# Patient Record
Sex: FEMALE | Race: WHITE
[De-identification: names, ages, dates, MRNs, and addresses within clinical notes are randomized per-mention and may not be internally consistent; named-entity substitution may affect disease eponyms.]

---

## 2019-09-24 ENCOUNTER — HOSPITAL ENCOUNTER (OUTPATIENT)
Dept: HOSPITAL 46 - OPS | Age: 79
Discharge: HOME | End: 2019-09-24
Attending: SURGERY
Payer: MEDICARE

## 2019-09-24 VITALS — WEIGHT: 145 LBS | BODY MASS INDEX: 25.69 KG/M2 | HEIGHT: 63 IN

## 2019-09-24 DIAGNOSIS — Z12.11: Primary | ICD-10-CM

## 2019-09-24 DIAGNOSIS — K21.9: ICD-10-CM

## 2019-09-24 DIAGNOSIS — K57.30: ICD-10-CM

## 2019-09-24 DIAGNOSIS — Z88.5: ICD-10-CM

## 2019-09-24 DIAGNOSIS — K64.8: ICD-10-CM

## 2019-09-24 DIAGNOSIS — Z79.899: ICD-10-CM

## 2019-09-24 DIAGNOSIS — Z88.1: ICD-10-CM

## 2019-09-24 DIAGNOSIS — K63.5: ICD-10-CM

## 2019-09-24 DIAGNOSIS — Z79.82: ICD-10-CM

## 2019-09-24 DIAGNOSIS — Z88.0: ICD-10-CM

## 2019-09-24 DIAGNOSIS — Z86.010: ICD-10-CM

## 2019-09-24 DIAGNOSIS — Z88.2: ICD-10-CM

## 2019-09-24 DIAGNOSIS — Z98.890: ICD-10-CM

## 2019-09-24 DIAGNOSIS — I10: ICD-10-CM

## 2019-09-24 PROCEDURE — 0DBE8ZZ EXCISION OF LARGE INTESTINE, VIA NATURAL OR ARTIFICIAL OPENING ENDOSCOPIC: ICD-10-PCS | Performed by: SURGERY

## 2019-09-24 PROCEDURE — G0500 MOD SEDAT ENDO SERVICE >5YRS: HCPCS

## 2019-09-24 NOTE — NUR
09/24/19 1056 [address] Rangel 1036 PT ARRIVED IN PACU SLEEPY. ABD SOFT. 1045 PT AWAKENS. SITTING
UP IN BED. GLASSES RETURNED.

## 2019-09-24 NOTE — OR
[facility]
                                    [address], [place]  [address]
_________________________________________________________________________________________
                                                                 Signed   
 
 
DATE OF OPERATION:
09/24/2019
 
SURGEON:
Issa Simons MD
 
PREOPERATIVE DIAGNOSES:
1. Personal history of hyperplastic rectal polyps in 2008.
2. Resolved chronic diarrhea.
 
POSTOPERATIVE DIAGNOSES:
1. Minimal sigmoid diverticulosis.
2. 4 mm polyp at 55 cm.
3. Minimal internal hemorrhoids.
 
PROCEDURE PERFORMED:
Colonoscopy with hot biopsy.
 
ESTIMATED BLOOD LOSS:
None.
 
INDICATIONS:
Virginia is a 79-year-old female, who still has good functional status.  She has used a
cane for many years because of poor balance.  Otherwise, she does great.  We did a
colonoscopy in 2008.  She had just a few tiny hyperplastic polyps in the rectum and in
the distal sigmoid colon.  She has no family history of colon cancer or polyps.  She has
no lower GI complaints currently.  She told me the diarrhea has resolved.  She went
through a rather large kidney stone extraction back in 2016.  This required a
nephrostomy tube and so forth.  She also had cardiac clearance for that surgery.  She
said she went through it quite nicely.  In the office, I gave Virginia a pamphlet on
colonoscopy.  We did review the nature of the test along with its risks including, but
not limited to gas, bloating, crampy abdominal pain, bleeding, perforation requiring
surgery, and missed diagnosis.  We also discussed the need for IV conscious sedation.
She has done well with Versed and fentanyl in the past.  She had expressed understanding
and wished to proceed. 
 
PROCEDURE NOTE:
Virginia was taken into our endoscopy suite and placed in the left lateral decubitus
position.  She was given a total of 5 mg of Versed, 125 mcg of fentanyl to cover the
case.  A digital rectal exam was performed and this was unremarkable.  The adult
colonoscope was then introduced and advanced under direct visualization of the camera.
She required some extra sedation as we came through her sigmoid colon.  She does have
 
    Electronically Signed By: ISSA SIMONS MD  09/24/19 1656
_________________________________________________________________________________________
PATIENT NAME:     ONEIL LOPEZ               
MEDICAL RECORD #: [id number]            OPERATIVE REPORT              
          ACCT #: [id number]  
DATE OF BIRTH:   [birth year]            REPORT #: [id number]      
PHYSICIAN:        ISSA SIMONS MD             
PCP:              CAPO SCOTT MD           
REPORT IS CONFIDENTIAL AND NOT TO BE RELEASED WITHOUT AUTHORIZATION
 
 
                                  [facility]
                                    [address], [place]  [address]
_________________________________________________________________________________________
                                                                 Signed   
 
 
minimal sigmoid diverticulosis, but the colon is a little bit narrow and it just took a
little extra sedation.  Once we reached the cecum, we could easily see the appendiceal
orifice and the ileocecal valve.  Her right colon was actually fairly short.  Her prep
was quite good.  We slowly withdrew the scope.  Pictures were taken throughout for
photodocumentation.  Back at 55-60 cm, she had a small polypoid lesion, which we removed
with the help of hot biopsy forceps.  The scope was then withdrawn further through the
sigmoid colon again back into the rectum.  The rectum was unremarkable.  We had just
enough room to retroflex the scope and she has some very minimal internal hemorrhoid
tissue.  After this, the gas was suctioned out and colonoscope removed.  Virginia
tolerated the procedure quite well. 
 
RECOMMENDATIONS:
I will see Virginia back in my office in 7 to 14 days to review her results.  She will
resume aspirin in 1 week. 
 
 
 
            ________________________________________
            Issa Simons MD 
 
 
ALB/MODL
Job #:  [id number]
DD:  09/24/2019 10:42:06
DT:  09/24/2019 14:50:44
 
cc:            MD Capo Ramirez MD
 
 
Copies:  ISSA SIMONS MD, JONATHAN MD
~
 
 
 
 
 
 
 
 
 
    Electronically Signed By: ISSA SIMONS MD  09/24/19 1656
_________________________________________________________________________________________
PATIENT NAME:     ONEIL LOPEZ               
MEDICAL RECORD #: [id number]            OPERATIVE REPORT              
          ACCT #: [id number]  
DATE OF BIRTH:   [birth year]            REPORT #: [id number]      
PHYSICIAN:        ISSA SIMONS MD             
PCP:              CAPO SCOTT MD           
REPORT IS CONFIDENTIAL AND NOT TO BE RELEASED WITHOUT AUTHORIZATION

## 2019-09-25 NOTE — PATH
[facility]
                                    [address], [place]  [address]
_________________________________________________________________________________________
                                                                 Signed   
 
 
 
SPECIMEN(S): A COLON POLYP AT 55 CM
 
SPECIMEN SOURCE:
A. COLON POLYP AT 55 CM
 
CLINICAL HISTORY:
Hx: Polyps. Postop: Diverticulosis, polyp.
MICROSCOPIC DESCRIPTION:
Histologic sections of all submitted blocks are examined by light microscopy. 
These findings, together with the gross examination, support the pathologic 
diagnosis. 
 
FINAL PATHOLOGIC DIAGNOSIS:
Mucosa, colon at 55 cm, biopsy:
-  Surface features suggestive but not entirely diagnostic of hyperplastic 
polyp. 
 
COMMENT:
Multiple levels over three slides are examined.  No adenomatous change or full 
thickness hyperplastic change is seen. 
LJA:cml:C2NR
 
GROSS DESCRIPTION:
The specimen, labeled "VR, colon polyp at 55 cm," is received in formalin and 
consists of two tan-white soft tissue fragments each measuring 0.3 cm in 
greatest dimension.  The specimen is entirely 
submitted in cassette (A1).
AR (under the direct supervision of a pathologist)
The Gross Description was prepared using a voice recognition system.  The 
report was reviewed for accuracy; however, sound-alike word errors, addition 
and/or deletions may occur.  If there is any 
question about this report, please contact Client Services.
 
PERFORMING LABORATORY:
The technical component was performed by [facility], [address] [place] [id number] (Medical Director: Roma Owens MD; CLIA# [id number]). 
Professional interpretation was performed by 
[facility], [address] [address] (Medical Director:  Lee Mccord MD; CLIA# 
[id number]). 
 
 
                                                                                    
_________________________________________________________________________________________
PATIENT NAME:     ONEIL LOPEZ               
MEDICAL RECORD #: [id number]            PATHOLOGY                     
          ACCT #: [id number]       ACCESSION #: [id number]     
DATE OF BIRTH:   [birth year]            REPORT #: [id number]       
PHYSICIAN:        [facility]              
PCP:              CAPO SCOTT MD           
REPORT IS CONFIDENTIAL AND NOT TO BE RELEASED WITHOUT AUTHORIZATION
 
 
                                  [address] [place], [place]  [address]
_________________________________________________________________________________________
                                                                 Signed   
 
 
Diagnostician:  Lee Mccord MD
Pathologist
Electronically Signed 09/25/2019
 
 
Copies:                                
~
 
 
 
 
 
 
 
 
 
 
 
 
 
 
 
 
 
 
 
 
 
 
 
 
 
 
 
 
 
 
 
 
 
 
 
 
                                                                                    
_________________________________________________________________________________________
PATIENT NAME:     ONEIL LOPEZ               
MEDICAL RECORD #: [id number]            PATHOLOGY                     
          ACCT #: [id number]       ACCESSION #: [id number]     
DATE OF BIRTH:   [birth year]            REPORT #: [id number]       
PHYSICIAN:        [facility]              
PCP:              CAPO SCOTT MD           
REPORT IS CONFIDENTIAL AND NOT TO BE RELEASED WITHOUT AUTHORIZATION

## 2020-08-02 ENCOUNTER — HOSPITAL ENCOUNTER (INPATIENT)
Dept: HOSPITAL 46 - ED | Age: 80
LOS: 8 days | Discharge: NURSING, REHAB OR LONG-TERM CARE | DRG: 482 | End: 2020-08-10
Attending: SPECIALIST | Admitting: SPECIALIST
Payer: MEDICARE

## 2020-08-02 VITALS — WEIGHT: 149.01 LBS | BODY MASS INDEX: 26.4 KG/M2 | HEIGHT: 63 IN

## 2020-08-02 DIAGNOSIS — R82.71: ICD-10-CM

## 2020-08-02 DIAGNOSIS — K59.00: ICD-10-CM

## 2020-08-02 DIAGNOSIS — Z20.828: ICD-10-CM

## 2020-08-02 DIAGNOSIS — S72.041A: Primary | ICD-10-CM

## 2020-08-02 DIAGNOSIS — R09.02: ICD-10-CM

## 2020-08-02 DIAGNOSIS — K21.9: ICD-10-CM
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
## 2020-08-02 PROCEDURE — P9016 RBC LEUKOCYTES REDUCED: HCPCS

## 2020-08-02 PROCEDURE — C1713 ANCHOR/SCREW BN/BN,TIS/BN: HCPCS

## 2020-08-02 PROCEDURE — C9803 HOPD COVID-19 SPEC COLLECT: HCPCS

## 2020-08-02 PROCEDURE — A9270 NON-COVERED ITEM OR SERVICE: HCPCS

## 2020-08-02 NOTE — XMS
Encounter Summary
  Created on: 2020
 
 John Virginia Su
 External Reference #: [id number]
 : 42
 Sex: Female
 
 Demographics
 
 
+-----------------------+----------------------+
| Address               | [address]    |
|                       | [place]  [address] |
+-----------------------+----------------------+
| Home Phone            | [phone]      |
+-----------------------+----------------------+
| Preferred Language    | Unknown              |
+-----------------------+----------------------+
| Marital Status        |              |
+-----------------------+----------------------+
| Alevism Affiliation | Unknown              |
+-----------------------+----------------------+
| Race                  | Unknown              |
+-----------------------+----------------------+
| Ethnic Group          | Unknown              |
+-----------------------+----------------------+
 
 
 Author
 
 
+--------------+--------------------------------------------+
| Author       | [facility]  |
|              | and [facility]                                |
+--------------+--------------------------------------------+
| Organization | [facility]  |
|              | and [facility]                                |
+--------------+--------------------------------------------+
| Address      | Unknown                                    |
+--------------+--------------------------------------------+
| Phone        | Unavailable                                |
+--------------+--------------------------------------------+
 
 
 
 Support
 
 
+---------------+--------------+-------------------+-----------------+
| Name          | Relationship | Address           | Phone           |
+---------------+--------------+-------------------+-----------------+
| Nawaf Carrera | ECON         | [address]  | [phone] |
|               |              | [place], [place]   |                 |
|               |              | [address]             |                 |
+---------------+--------------+-------------------+-----------------+
 
 
 
 
 Care Team Providers
 
 
+----------------------------+------+-----------------+
| Care Team Member Name      | Role | Phone           |
+----------------------------+------+-----------------+
| Carmine Gomez  | PCP  | [phone] |
| MD                         |      |                 |
+----------------------------+------+-----------------+
 
 
 
 Reason for Referral
 Diagnostic/Screening (Routine)
 
+--------+--------+-----------+--------------+--------------+---------------+
| Status | Reason | Specialty | Diagnoses /  | Referred By  | Referred To   |
|        |        |           | Procedures   | Contact      | Contact       |
+--------+--------+-----------+--------------+--------------+---------------+
| Closed |        | Radiology |   Diagnoses  |   [place], |   [facility] |
|        |        |           |  Abnormal    |  MD Cynthia  |  Medicine     |
|        |        |           | electrocardi |  [address]    | [address]  |
|        |        |           | ogram (ECG)  | [address]   |  [place], |
|        |        |           | (EKG)        | [place], |  [place]           |
|        |        |           | Encounter    |  [place] [address]    | [address]    |
|        |        |           | for other    | Phone:       | Phone:        |
|        |        |           | preprocedura | [phone] | [phone]  |
|        |        |           | l            |   Fax:       |  Fax:         |
|        |        |           | examination  | [phone] | [phone]  |
|        |        |           |  Procedures  |              |               |
|        |        |           |  NM Nuclear  |              |               |
|        |        |           | Stress Test  |              |               |
|        |        |           | (Vasodilator |              |               |
|        |        |           | )  CHG       |              |               |
|        |        |           | MYOCARDIAL   |              |               |
|        |        |           | SPECT        |              |               |
|        |        |           | MULTIPLE     |              |               |
|        |        |           | STUDIES  ND  |              |               |
|        |        |           | CV STRS TST  |              |               |
|        |        |           | XERS&/OR RX  |              |               |
|        |        |           | CONT ECG W/O |              |               |
|        |        |           |  I&R  ND     |              |               |
|        |        |           | CARDIAC      |              |               |
|        |        |           | STRESS       |              |               |
|        |        |           | TST,INTERP/R |              |               |
|        |        |           | EPT ONLY     |              |               |
+--------+--------+-----------+--------------+--------------+---------------+
 
 
 Diagnostic/Screening (Routine)
 
+--------+--------+-----------+--------------+--------------+---------------+
| Status | Reason | Specialty | Diagnoses /  | Referred By  | Referred To   |
|        |        |           | Procedures   | Contact      | Contact       |
+--------+--------+-----------+--------------+--------------+---------------+
| Closed |        | Radiology |   Diagnoses  |   [place], |   [facility]    |
|        |        |           |  Pre-op exam |  MD Cynthia  | [address]  |
|        |        |           |   Abnormal   |  [address]    |  [place], |
|        |        |           | EKG          | [address]   |  [place]           |
 
|        |        |           | Procedures   | [place], | [address]    |
|        |        |           | ECHO         |  [place] [address]    | Phone:        |
|        |        |           | Complete  ND | Phone:       | [phone]  |
|        |        |           |  ECHO HEART  | [phone] |  Fax:         |
|        |        |           | XTHORACIC,CO |   Fax:       | [phone]  |
|        |        |           | MPLETE W     | [phone] |               |
|        |        |           | DOPPLER  ND  |              |               |
|        |        |           | ECHO HEART   |              |               |
|        |        |           | XTHORACIC,CO |              |               |
|        |        |           | MPLETE, W/O  |              |               |
|        |        |           | DOPPLER      |              |               |
+--------+--------+-----------+--------------+--------------+---------------+
 
 
 
 
 Reason for Visit
 
 
+----------------+-------------------------------------+
| Reason         | Comments                            |
+----------------+-------------------------------------+
| New Patient    | RBBB                                |
+----------------+-------------------------------------+
| Pre-op Exam    | Kidney stone and Bladder Suspension |
+----------------+-------------------------------------+
| Abnormal Tests | EKG                                 |
+----------------+-------------------------------------+
 Evaluate & Treat (Routine)
 
+--------+--------+------------+--------------+--------------+---------------+
| Status | Reason | Specialty  | Diagnoses /  | Referred By  | Referred To   |
|        |        |            | Procedures   | Contact      | Contact       |
+--------+--------+------------+--------------+--------------+---------------+
| Closed |        | Cardiology |   Diagnoses  |   [place],   |   [place],  |
|        |        |            |  Abnormal    | Carmine     | MD Cynthia    |
|        |        |            | EKG  RBBB    | MD Delio  | [address]      |
|        |        |            | Preop        |  [address]     | [address]    |
|        |        |            | cardiovascul | Kaylyn [facility]  | [place],  |
|        |        |            | ar exam      |  [place]  | [place] [address]      |
|        |        |            | Procedures   | OR           | Phone:        |
|        |        |            | NP           | [address]   | [phone]  |
|        |        |            |              | Phone:       |  Fax:         |
|        |        |            |              | [phone] | [phone]  |
|        |        |            |              |   Fax:       |               |
|        |        |            |              | [phone] |               |
+--------+--------+------------+--------------+--------------+---------------+
 
 
 
 
 Encounter Details
 
 
+--------+---------+----------------------+----------------------+---------------------+
| Date   | Type    | Department           | Care Team            | Description         |
+--------+---------+----------------------+----------------------+---------------------+
| / | Office  |   [facility]          |   Cynthia Thomson, | RBBB (Primary Dx);  |
|    | Visit   | CARDIOLOGY  [address]    |  MD  [address] | Pre-op exam;        |
|        |         | [address]  [place], |  St.  [place],   | Abnormal EKG        |
 
|        |         |  [place] [address]       | [place] [address]             |                     |
|        |         | [phone]         | [phone]         |                     |
|        |         |                      | [phone] (Fax)   |                     |
+--------+---------+----------------------+----------------------+---------------------+
 
 
 
 Social History
 
 
+---------------+-------+-----------+--------+------+
| Tobacco Use   | Types | Packs/Day | Years  | Date |
|               |       |           | Used   |      |
+---------------+-------+-----------+--------+------+
| Former Smoker |       |           |        |      |
+---------------+-------+-----------+--------+------+
 
 
 
+---------------------+---+---+---+
| Smokeless Tobacco:  |   |   |   |
| Never Used          |   |   |   |
+---------------------+---+---+---+
 
 
 
+------------------------+
| Comments: quit in  |
+------------------------+
 
 
 
+-------------+----------------------+---------+----------+
| Alcohol Use | Drinks/Week          | oz/Week | Comments |
+-------------+----------------------+---------+----------+
| No          |   0 Standard drinks  | 0.0     |          |
|             | or equivalent        |         |          |
+-------------+----------------------+---------+----------+
 
 
 
+------------------+---------------+
| Sex Assigned at  | Date Recorded |
| Birth            |               |
+------------------+---------------+
| Not on file      |               |
+------------------+---------------+
 documented as of this encounter
 
 Last Filed Vital Signs
 
 
+-------------------+----------------------+----------------------+----------+
| Vital Sign        | Reading              | Time Taken           | Comments |
+-------------------+----------------------+----------------------+----------+
| Blood Pressure    | 124/82               | 2016  2:06 PM  |          |
|                   |                      | PDT                  |          |
+-------------------+----------------------+----------------------+----------+
| Pulse             | 84                   | 2016  1:57 PM  | Regular  |
|                   |                      | PDT                  |          |
 
+-------------------+----------------------+----------------------+----------+
| Temperature       | -                    | -                    |          |
+-------------------+----------------------+----------------------+----------+
| Respiratory Rate  | 16                   | 2016  1:57 PM  |          |
|                   |                      | PDT                  |          |
+-------------------+----------------------+----------------------+----------+
| Oxygen Saturation | -                    | -                    |          |
+-------------------+----------------------+----------------------+----------+
| Inhaled Oxygen    | -                    | -                    |          |
| Concentration     |                      |                      |          |
+-------------------+----------------------+----------------------+----------+
| Weight            | 74.1 kg (163 lb 4.8  | 2016  1:57 PM  |          |
|                   | oz)                  | PDT                  |          |
+-------------------+----------------------+----------------------+----------+
| Height            | 160 cm (5' 3")       | 2016  1:57 PM  |          |
|                   |                      | PDT                  |          |
+-------------------+----------------------+----------------------+----------+
| Body Mass Index   | 28.93                | 2016  1:57 PM  |          |
|                   |                      | PDT                  |          |
+-------------------+----------------------+----------------------+----------+
 documented in this encounter
 
 Patient Instructions
 Patient Instructions Sharda Boucher RN - 2016  3:13 PM PDTEcho:  
  Date:____________________________________
 
  Check-In Time:____________________________
 
  Where to Check In:_________________________
 
 Persantine/Lexiscan Myoview
 
 Date: _______________________________________________ 
 
 Check-in Time: _______________________________________
 
 Where to Check In: _______________________________________
 
 Instructions
 
 1. Nothing to eat or drink anything 6 hours prior to Persantine/Lexiscan
 2.  DO NOT drink caffeine 12 hours prior to the test.
 3.  You can take all other medications the morning of the test with a small sip of water.
 4.  Please bring a list of your current medications with you.
 
 Resting Portion of test:
 
 Date: _________________________________________________
 
 Check-in Time: _________________________________________
 
 Where to Check In: _________________________________________
 
 Follow up appointment: 2-4 weeks
 
  Provider: Cynthia Thomson MD
 
  Date:___________________________________________________
 
  Check-In Time:___________________________________________
 
 Electronically signed by Sharda Boucher RN at 2016  3:14 PM PDT
 documented in this encounter
 
 Progress Notes
 Cynthia Thomson MD - 2016  2:05 PM PDTFormatting of this note might be different f
rom the original.
   
 
 PATIENT NAME:  Laura Castelan  
 : 1940:         AGE: 76 y.o.
 REFERRED BY: Carmine Gomez PRIMARY CARE:
 Carmine Gomez MD
  
 
 NEW PATIENT OFFICE VISIT
 
 Date of Service: 16
 
 HISTORY OF PRESENT ILLNESS:
 Laura Castelan is a 76 y.o. female with a history of osteoporosis, heart murmur, kidney
 stone, tipped bladder and abnormal EKG.  She is being seen today for preop clearance prior 
to bladder surgery.
 
 Patient was recently seen by Dr. Cruz for a kidney stone who planned to perform operation
.  EKG was performed and revealed a right bundle branch block.
 
 Today, patient complains of bilateral ankle swelling.  However, her physical activity is ve
ry limited.  She cannot walk far due to the imbalance.  She's been most of the time sitting,
 watching TV and reading books.  She was told that she had a heart murmur in the past.  Ther
e is no chest pain or chest discomfort both at rest and on exertion.  Patient denies breathl
essness.  There is no palpitation dizziness or lightheadedness.  Patient can sleep on one pi
llow at night without difficulty breathing.  
 
 CURRENT PROBLEMS
 Patient Active Problem List 
 Diagnosis 
   Bundle branch block, right 
   Cystitis, subacute 
   Essential hypertension, benign 
   GERD (gastroesophageal reflux disease) 
   Right nephrolithiasis 
   Osteoporosis, postmenopausal 
   Vitamin D deficiency 
 
 MEDICAL, SURGICAL, AND PERSONAL HISTORY
 Past Surgical History 
 Procedure Laterality Date 
   Colonoscopy  2008 
   hyperplastic polyps 
   Appendectomy   
   Hysterectomy, total abdominal   
   removal of both ovaries 
  
 
 Family History 
 Problem Relation Age of Onset 
   Cirrhosis Father  
   Diabetes Mother  
   Heart disease Mother  
   arteriosclerotic 
 
 
 Family Status 
 Relation Status Death Age 
   Father  58 
   Mother  62 
   Sister Alive  
   Brother Alive  
   Brother Alive  
   Sister   
   Brother   
   Brother   
 
 History 
 
 Social History 
   Marital Status:  
   Spouse Name: N/A 
   Number of Children: 3 
   Years of Education: N/A 
 
 Social History Main Topics 
   Smoking status: Former Smoker 
   Smokeless tobacco: Never Used 
    Comment: quit in  
   Alcohol Use: No 
   Drug Use: No 
   Sexual Activity: Not on file 
 
 Other Topics Concern 
   None 
 
 Social History Narrative 
  Exercise:Stationary bike 
  Caffeine: 1 cup to 1 pot of coffee daily 
  Living situation:alone 
 
 CURRENT MEDICATIONS
 Current Outpatient Prescriptions 
 Medication Sig Dispense Refill 
   alendronate (FOSAMAX) 70 mg tablet Take 70 mg by mouth Once a week.   
   aspirin 81 MG tablet Take 81 mg by mouth Daily.   
   Cholecalciferol (VITAMIN D-3) 2000 units CAPS Take 4,000 Units by mouth Daily.   
   cyanocobalamin (VITAMIN B-12) 500 mcg tablet Take 500 mcg by mouth Daily.   
   ranitidine (ZANTAC) 300 MG capsule Take 300 mg by mouth nightly.   
 
 No current facility-administered medications for this visit. 
  
 
 ALLERGIES
 Allergies 
 Allergen Reactions 
   Codeine Other (See Comments) 
   hallucinations 
   Doxycycline Diarrhea and Nausea And Vomiting 
 
   Penicillins Hives and Rash 
   Sulfa Antibiotics Hives and Rash 
 
 ROS
 Review of Systems 
 Constitutional: Positive for malaise/fatigue. Negative for fever, chills, weight loss and d
iaphoresis. 
 HENT: Negative for congestion, hearing loss, nosebleeds, sore throat and tinnitus.  
 Eyes: Negative for blurred vision and double vision. 
 Respiratory: Positive for shortness of breath. Negative for cough and wheezing.  
 Cardiovascular: Positive for leg swelling. Negative for chest pain, palpitations, orthopnea
, claudication and PND. 
 Gastrointestinal: Positive for heartburn and abdominal pain. Negative for nausea, vomiting,
 diarrhea, constipation, blood in stool and melena. 
 Genitourinary: Positive for dysuria and flank pain. Negative for urgency, frequency and hem
aturia. 
 Musculoskeletal: Negative for myalgias, back pain, joint pain, falls and neck pain. 
 Skin: Negative for itching and rash. 
 Neurological: Positive for tremors. Negative for dizziness, tingling, seizures, loss of con
sciousness, weakness and headaches. 
 Endo/Heme/Allergies: Negative for environmental allergies and polydipsia. Bruises/bleeds ea
sily. 
 Psychiatric/Behavioral: Negative for memory loss. The patient is not nervous/anxious.  
 
 OBJECTIVE:  
 
 PHYSICAL EXAM
 /82 mmHg | Pulse 84 | Resp 16 | Ht 1.6 m (5' 3") | Wt 74.072 kg (163 lb 4.8 oz) | BMI
 28.93 kg/m2
 Physical Exam 
 Constitutional: She appears well-developed and well-nourished. No distress. 
 Female individual without acute distress, arrived with a cane, accompanied by her son. 
 Neck: Normal carotid pulses, no hepatojugular reflux and no JVD present. Carotid bruit is n
ot present. 
 Cardiovascular: Normal rate, regular rhythm, S1 normal, S2 normal, intact distal pulses and
 normal pulses.  PMI is not displaced.  Exam reveals no gallop, no S3, no S4 and no friction
 rub.  
 Murmur (rade 1/6 holosystolic murmur along) heard.
 Pulses:
      Carotid pulses are 2+ on the right side, and 2+ on the left side.
      Dorsalis pedis pulses are 2+ on the right side, and 2+ on the left side. 
 Pulmonary/Chest: Effort normal and breath sounds normal. No accessory muscle usage. No resp
iratory distress. She has no wheezes. She has no rhonchi. She has no rales. 
 Abdominal: Normal appearance, normal aorta and bowel sounds are normal. She exhibits no abd
ominal bruit. There is no hepatosplenomegaly. There is no tenderness. 
 Musculoskeletal: She exhibits edema (bilateral 1+ ankle pitting edema.). 
 Neurological: She is alert. Gait normal. 
 Skin: Skin is warm and dry. 
 Psychiatric: She has a normal mood and affect. Her mood appears not anxious. She does not e
xhibit a depressed mood. 
 
 ECG:  Normal sinus rhythm, complete right bundle branch block, nonspecific ST-T abnormaliti
es.
 
 LAB RESULTS: 
 LIPID
 No results found for: CHOL, TRIG, HDL, LDL, CHOLHDL, LDLEX, HDLEX, TRIGEX, CHOLEX
 CHEMISTRY
 Lab Results 
 Component Value Date 
 
  GLUEX 111* 2015 
  NAEX 138 2015 
  KEX 3.8 2015 
  CLEX 104 2015 
  CO2EX 25 2015 
  ASTEX 16 2015 
  ALTEX 9 2015 
  EGFREX 76 2015 
  CREEX 0.75 2015 
 
 HEMATOLOGY
 Lab Results 
 Component Value Date 
  WBCEX 13.6* 2015 
  HGBEX 15.3 2015 
  HCTEX 45.6* 2015 
  PLTEX 223 2015 
 
 I reviewed records from Carmine Gomez M.D. for office visit on 16.  Referr
al to cardiologist for preop clearance
 
 ASSESSMENT:  
 
 1.  Right bundle-branch block and ankle swelling, preop clearance prior to bladder surgery
  A. Patient was found to have a complete right bundle branch block on the preop EKG.
  B. Today, patient complains of bilateral ankle swelling.  However, her physical activity i
s very limited.  She cannot walk far due to the imbalance.  She's been most of the time sitt
ing, watching TV and reading books.  She was told that she had a heart murmur in the past.  
There is no chest pain or chest discomfort both at rest and on exertion.  Patient denies brett
athlessness. 
   She is in a class II of New York Heart Association functional class.  There is 1+ bilater
al ankle pitting edema on physical examination.
  She is undergoing an elective, non-emergent surgery and would benefit from a cardiac risk 
assessment.  Her Revised Cardiac Risk Index (RCRI) is calculated showing his risks include  
high risk surgery, coronary artery disease, congestive heart failure, cerebrovascular diseas
e, diabetes on insulin and serum creatinine >2mg/dL, which is 1 risk equating to Class II, e
stimated 0.9% risk of MACE.  This is considered an elevated risk.  Her Duke Activity Status 
Index (DASI) score is calculated and shows she has a poor functional capacity, so we are emily
ble to evaluate METS, and would benefit from further testing and risk stratification.  She i
s not on a beta blocker, and is not on HMG  CoA reductase inhibitor (statin).  She [employer]
logic stress test..  Prior to proceeding with surgery, the risk and benefit of the procedure
 need to be discussed between patient and surgeon.
 2.  Heart murmur
 3. Kidney stone, tipped bladder  
  A. Patient was recently seen by Dr. Cruz for a kidney stone who planned to perform opera
tion.  EKG was performed and revealed a right bundle branch block.
 
 PLAN:  
 
 1.  Echocardiogram is warranted to assess for potential heart failure.
 2.  Persantine SPECT MPI is indicated to assess cardiac risk prior to noncardiac surgery.
 3.  I recommend a therapeutic lifestyle change including walking 30 minutes a day, choosing
 healthy choices of diet , including DASH diet and weight reduction. 
 4.  Follow-up in 2-4 weeks.
 
 Electronically signed by: Cynthia Thomson MD [facility] 2016 
 
 Portions of this chart may have been created with Dragon voice recognition software. Occasi
onal wrong-word or   sound-alike  substitutions may have occurred due to the inherent saucedo
itations of voice recognition software. Please read the chart carefully and recognize, using
 
 context, where these substitutions have occurred.
 Electronically signed by Cynthia Thomson MD at 2016  4:28 PM PDTdocumented in this 
encounter
 
 Plan of Treatment
 Not on filedocumented as of this encounter
 
 Procedures
 
 
+----------------+--------+-------------+----------------------+----------------------+
| Procedure Name | Priori | Date/Time   | Associated Diagnosis | Comments             |
|                | ty     |             |                      |                      |
+----------------+--------+-------------+----------------------+----------------------+
| ECG 12 LEAD    | Routin | 2016  |   RBBB  Pre-op exam  |   Results for this   |
|                | e      |  1:55 PM    |                      | procedure are in the |
|                |        | PDT         |                      |  results section.    |
+----------------+--------+-------------+----------------------+----------------------+
 documented in this encounter
 
 Results
 NM Nuclear Stress Test (Vasodilator) (2016  1:27 PM PDT)
 
+----------+
| Specimen |
+----------+
|          |
+----------+
 
 
 
+------------------------------------------------------------------------+-----------------+
| Impressions                                                            | Performed At    |
+------------------------------------------------------------------------+-----------------+
|      1.    Persantine EKG is negative.  2.   Normal   Persantine       |   [facility]    |
| Sestamibi myocardial perfusion study with a normal   left ventricular  | [facility]        |
| size and wall thickness.   Preserved left ventricular   systolic       | [facility]  |
| function.   LVEF by gated SPECT 80 %.              Signed by: Cynthia   | - IMAGING       |
| MD Al[facility]     2016, 13:27                                |                 |
+------------------------------------------------------------------------+-----------------+
 
 
 
+------------------------------------------------------------------------+-----------------+
| Narrative                                                              | Performed At    |
+------------------------------------------------------------------------+-----------------+
|                     NUCLEAR MEDICINE STRESS TEST REPORT                |   [facility]    |
| Patient Name: Laura Castelan   Study Date:   2016   Primary   | [facility]        |
| Care Provider: Carmine Gomez MD   MRN:   [id number]   :      | [facility]  |
|   1940 Age:   76 y.o. Gender: female      CLINICAL                 | - IMAGING       |
| HISTORY/DIAGNOSIS:   Chest pain        PERSANTINE SESTAMIBI STRESS     |                 |
| TEST  Indication:   chest pain      Procedure:   In the supine         |                 |
| position, 42.1 mg of   Persantine was infused intravenously   over 4   |                 |
| minutes.   Blood pressure and EKG were monitored every 1 minute.   5   |                 |
|  mL of normal saline was utilized to flush the IV line.   2.5 minutes  |                 |
| later,   10.4 mCi sestamibi intravenous injection.   SPECT myocardial  |                 |
| perfusion   imaging was acquired with wall motion analysis.   Rest     |                 |
| imaging was   performed using 34.1 mCi Sestamibi intravenous           |                 |
| injection.   Repeated SPECT   myocardial perfusion imaging was         |                 |
| acquired with wall motion analysis.   At   the end of the procedure,   |                 |
 
| 75 mg of aminophylline was infused   intravenously.     Hemodynamics:  |                 |
|     Heart rate baseline 80 beats per minute, peak 94 beats per minute. |                 |
|    Blood   pressure baseline 143/72 mmHg, peak 119/65 mmHg.   EKG      |                 |
| baseline underlying   sinus rhythm, complete right bundle branch       |                 |
| block.       Peak unchanged.     Side Effects:   None.     Arrhythmia: |                 |
|    None.     Persantine Sestamibi Myocardial Perfusion Imaging Result: |                 |
|           The Persantine Sestamibi tomographic images, reviewed        |                 |
| without the   attenuation compensation resolution, revealed a normal   |                 |
| myocardial   perfusion pattern as seen in short axis, vertical long    |                 |
| axis, and   horizontal long axis projections. The left ventricular     |                 |
| cavity is normal.   The rest imaging is also normal.                   |                 |
| Gated SPECT reveals a normal left ventricular wall thickness and       |                 |
| motion.     Preserved left ventricular systolic function. LVEF by      |                 |
| gated SPECT is 80 %.                                                   |                 |
+------------------------------------------------------------------------+-----------------+
 
 
 
+----------------------+---------------------+--------------------+----------------+
| Performing           | Address             | City/State/Zipcode | Phone Number   |
| Organization         |                     |                    |                |
+----------------------+---------------------+--------------------+----------------+
|   [facility]     |   [address] |   [place]  |   [phone] |
| [facility]  |                     | [phone]              |                |
| - IMAGING            |                     |                    |                |
+----------------------+---------------------+--------------------+----------------+
 ECHO Complete (2016  8:50 AM PDT)
 
+----------+
| Specimen |
+----------+
|          |
+----------+
 
 
 
+-----------------------------------------------------------------------------+-------------
----+
| Narrative                                                                   | Performed At
    |
+-----------------------------------------------------------------------------+-------------
----+
|   This result has an attachment that is not available.  Transthoracic       |   [facility]
    |
| Echocardiography Report (TTE)  Demographics  Patient Name    JOHN PORTILLO    
    |
| [place] Room Number                        A  Patient Number               | [facility]
ER  |
| [id number]         Date of Study             2016  Visit Number      | - IMAGING   
    |
|     [id number]  Accession         [id number]          Referring            |             
    |
| Physician    ALIX MARIE Number  Date of Birth   1940            |             
    |
|     Sonographer                SENA PATEL                          |             
    |
|                                                                [place]       |             
    |
| [place],                                                                 |             
    |
 
|                         [place]  Age                  76 year(s)                 |             
    |
| Interpreting               ALIX MARIE                                 |             
    |
|                       Cardiologist               CYNTHIA THOMSON MD       |             
    |
|  Gender             Female                Nurse Procedure Type of           |             
    |
| Study  TTE procedure: ECHO Complete. Procedure dateDate:                    |             
    |
| 2016Start: 08:09 AM Study Location: Echo LabIndications:              |             
    |
| Abnormal .31/R94.31 and preop Exam [id number].Patient            |             
    |
| Status: RoutineHeight: 63 inchesWeight: 163 poundsBSA: 1.77 m^2BMI:         |             
    |
| 28.87 kg/m^2Rhythm: Normal Sinus RhythmHR: 78 bpm ConclusionsSummary1.      |             
    |
|  Normal left ventricular size, wall thickness and motion. Preserved         |             
    |
| leftventricular systolic function. LVEF is 65%.2. Grade 1 left              |             
    |
| ventricular diastolic dysfunction.3. Normal valvular structure.4.           |             
    |
| Normal right-sided pressure.5. Normal IVC with normal respiratory           |             
    |
| collapse.                                                                   |             
    |
| Signature-------------------------------------------------------------      |             
    |
| --------------- Electronically signed by CYNTHIA THOMSON,                  |             
    |
| MD(Interpreting physician) on 2016 07:47                              |             
    |
| AM--------------------------------------------------------------------      |             
    |
| -------- FindingsMitral ValveStructurally normal mitral valve without       |             
    |
| significant stenosis orregurgitation.Aortic ValveAortic valve is            |             
    |
| trileaflet without significant stenosis or regurgitation.Tricuspid          |             
    |
| ValveA vegetation is noted on the tricuspid valve. Suggestive of            |             
    |
| endocarditis.Pulmonic ValveStructurally normal pulmonic valve without       |             
    |
| significant stenosis orregurgitation.Left AtriumNormal left                 |             
    |
| atrium.Left VentricleLeft ventricle is normal in size and function.         |             
    |
| Ejection fraction isestimated at 65 %.Grade 1 left ventricular              |             
    |
| diastolic dysfunction.Right AtriumNormal right atrium.Right                 |             
    |
| VentricleNormal right ventricular structure and function.Pericardial        |             
    |
| EffusionNo evidence of pericardial effusion. MiscellaneousNormal            |             
    |
| aortic root.The IVC appears normal. Valves  Mitral Valve  Peak E-Wave:      |             
    |
 
|  0.6 m/s Peak A-Wave: 0.83 m/s  Tissue Doppler  Septal e' Velocity:         |             
    |
| 0.06 m/s Septal E/e' Ratio:9.56  Aortic Valve        Mean Gradient:         |             
    |
| 2.05 mmHg  LVOT  Peak Velocity: 1.07 m/s Structures  Left Atrium  LA        |             
    |
| A/P Dimension: 3.4 cm                            LA Area: 11.03 cm^2        |             
    |
| LA Vol/BSA Index: 11 mL/m^2                        LA Volume: 18.71 ml      |             
    |
|   Left Ventricle  Diastolic Dimension: 4.2 cm             Systolic          |             
    |
| Dimension: 2.77 cm Septum Diastolic: 1 cm PW Diastolic: 1 cm EF             |             
    |
| Calculated: 70%  Miscellaneous  Aorta  Aortic Root: 3.33 cm Ascending       |             
    |
| Aorta: 3.58 cm                                                              |             
    |
|----------------------------------------------------------------------------  |            
     |
| Electronically signed by NAT MARRUFOInterpreting physician) on    |             
    |
| 2016 07:47 AM                                                         |             
    |
|----------------------------------------------------------------------------  |            
     |
|                                                                             |             
    |
|Findings                                                                    |              
   |
|Mitral Valve                                                                 |             
    |
|Structurally normal mitral valve without significant stenosis or             |             
    |
|regurgitation.                                                              |              
   |
|Aortic Valve                                                                 |             
    |
|Aortic valve is trileaflet without significant stenosis or regurgitation.    |             
    |
|Tricuspid Valve                                                              |             
    |
|A vegetation is noted on the tricuspid valve. Suggestive of endocarditis.    |             
    |
|Pulmonic Valve                                                               |             
    |
|Structurally normal pulmonic valve without significant stenosis or           |             
    |
|regurgitation.                                                              |              
   |
|Left Atrium                                                                  |             
    |
|Normal left atrium.                                                          |             
    |
|Left Ventricle                                                               |             
    |
|Left ventricle is normal in size and function. Ejection fraction is          |             
    |
|estimated at 65 %.                                                           |             
    |
 
|Grade 1 left ventricular diastolic dysfunction.                              |             
    |
|Right Atrium                                                                 |             
    |
|Normal right atrium.                                                         |             
    |
|Right Ventricle                                                              |             
    |
|Normal right ventricular structure and function.                             |             
    |
|Pericardial Effusion                                                         |             
    |
|No evidence of pericardial effusion.                                         |             
    |
|                                                                             |             
    |
|Miscellaneous                                                               |              
   |
|Normal aortic root.                                                          |             
    |
|The IVC appears normal.                                                      |             
    |
|                                                                             |             
    |
|Valves                                                                      |              
   |
|                                                                             |             
    |
| Mitral Valve                                                                |             
    |
|                                                                             |             
    |
| Peak E-Wave: 0.6 m/s                                                        |             
    |
| Peak A-Wave: 0.83 m/s                                                       |             
    |
|                                                                             |             
    |
| Tissue Doppler                                                              |             
    |
|                                                                             |             
    |
| Septal e' Velocity: 0.06 m/s                                                |             
    |
| Septal E/e' Ratio:9.56                                                      |             
    |
|                                                                             |             
    |
| Aortic Valve                                                                |             
    |
|                                                                             |             
    |
|       Mean Gradient: 2.05 mmHg                                              |             
    |
|                                                                             |             
    |
| LVOT                                                                        |             
    |
|                                                                             |             
    |
 
| Peak Velocity: 1.07 m/s                                                     |             
    |
|                                                                             |             
    |
|Structures                                                                  |              
   |
|                                                                             |             
    |
| Left Atrium                                                                 |             
    |
|                                                                             |             
    |
| LA A/P Dimension: 3.4 cm                            LA Area: 11.03 cm^2     |             
    |
| LA Vol/BSA Index: 11 mL/m^2                        LA Volume: 18.71 ml      |             
    |
|                                                                             |             
    |
| Left Ventricle                                                              |             
    |
|                                                                             |             
    |
| Diastolic Dimension: 4.2 cm             Systolic Dimension: 2.77 cm         |             
    |
| Septum Diastolic: 1 cm                                                      |             
    |
| PW Diastolic: 1 cm                                                          |             
    |
| EF Calculated: 70%                                                          |             
    |
|                                                                             |             
    |
| Miscellaneous                                                               |             
    |
|                                                                             |             
    |
| Aorta                                                                       |             
    |
|                                                                             |             
    |
| Aortic Root: 3.33 cm                                                        |             
    |
| Ascending Aorta: 3.58 cm                                                    |             
    |
|                                                                             |             
    |
+-----------------------------------------------------------------------------+-------------
----+
 
 
 
+------------------------------------------------------------------------------------------+
| Procedure Note                                                                           |
+------------------------------------------------------------------------------------------+
|   Sergio Cuevas Results In - 2016  7:48 AM PDT  Transthoracic Echocardiography Report   |
| (TTE) Demographics Patient Name   JOHN RIGGS Room Number                NEELIMA Patient  |
| Number [id number]      Date of Study         2016 Visit Number   [id number]      |
| Accession      [id number]       Referring Physician   ALIX MARIE Number Date of    |
| Birth  1940       Sonographer           SENA PATEL                        |
|                                 BHANUJOSH HAMM,                                       |
 
|                  US Age            76 year(s)       Interpreting          ALIX      |
| CYNTHIA                                 Cardiologist          CYNTHIA THOMSON MD Gender |
|          Female           NurseProcedureType of Study TTE procedure: ECHO                |
| Complete.Procedure dateDate: 2016Start: 08:09 AMStudy Location: Echo               |
| LabIndications: Abnormal .31/R94.31 and preop Exam [id number].Patient Status: |
|  RoutineHeight: 63 inchesWeight: 163 poundsBSA: 1.77 m^2BMI: 28.87 kg/m^2Rhythm: Normal  |
| Sinus RhythmHR: 78 bpmConclusionsSummary1. Normal left ventricular size, wall thickness  |
| and motion. Preserved leftventricular systolic function. LVEF is 65%.2. Grade 1 left     |
| ventricular diastolic dysfunction.3. Normal valvular structure.4. Normal right-sided     |
| pressure.5. Normal IVC with normal respiratory                                           |
| collapse.Signature---------------------------------------------------------------------- |
| ------ Electronically signed by CYNTHIA THOMSON MD(Interpreting physician) on          |
| 2016 07:47                                                                         |
| AM----------------------------------------------------------------------------FindingsMi |
| tral ValveStructurally normal mitral valve without significant stenosis                  |
| orregurgitation.Aortic ValveAortic valve is trileaflet without significant stenosis or   |
| regurgitation.Tricuspid ValveA vegetation is noted on the tricuspid valve. Suggestive of |
|  endocarditis.Pulmonic ValveStructurally normal pulmonic valve without significant       |
| stenosis orregurgitation.Left AtriumNormal left atrium.Left VentricleLeft ventricle is   |
| normal in size and function. Ejection fraction isestimated at 65 %.Grade 1 left          |
| ventricular diastolic dysfunction.Right AtriumNormal right atrium.Right VentricleNormal  |
| right ventricular structure and function.Pericardial EffusionNo evidence of pericardial  |
| effusion.MiscellaneousNormal aortic root.The IVC appears normal.Valves Mitral Valve Peak |
|  E-Wave: 0.6 m/s Peak A-Wave: 0.83 m/s Tissue Doppler Septal e' Velocity: 0.06 m/s       |
| Septal E/e' Ratio:9.56 Aortic Valve     Mean Gradient: 2.05 mmHg LVOT Peak Velocity:     |
| 1.07 m/sStructures Left Atrium LA A/P Dimension: 3.4 cm                   LA Area: 11.03 |
|  cm^2 LA Vol/BSA Index: 11 mL/m^2                LA Volume: 18.71 ml Left Ventricle      |
| Diastolic Dimension: 4.2 cm         Systolic Dimension: 2.77 cm Septum Diastolic: 1 cm   |
| PW Diastolic: 1 cm EF Calculated: 70% Miscellaneous Aorta Aortic Root: 3.33 cm Ascending |
|  Aorta: 3.58 cm                                                                          |
|Rhythm: Normal Sinus RhythmHR: 78 bpm                                                     |
|                                                                                          |
|Conclusions                                                                              |
|Summary                                                                                  |
|1. Normal left ventricular size, wall thickness and motion. Preserved left                |
|ventricular systolic function. LVEF is 65%.                                               |
|2. Grade 1 left ventricular diastolic dysfunction.                                        |
|3. Normal valvular structure.                                                             |
|4. Normal right-sided pressure.                                                           |
|5. Normal IVC with normal respiratory collapse.                                           |
|                                                                                          |
|Signature                                                                                |
|----------------------------------------------------------------------------               
|
| Electronically signed by CYNTHIA THOMSON MD(Interpreting physician) on                 |
| 2016 07:47 AM                                                                      |
|----------------------------------------------------------------------------               
|
|                                                                                          |
|Findings                                                                                 |
|Mitral Valve                                                                              |
|Structurally normal mitral valve without significant stenosis or                          |
|regurgitation.                                                                           |
|Aortic Valve                                                                              |
|Aortic valve is trileaflet without significant stenosis or regurgitation.                 |
|Tricuspid Valve                                                                           |
|A vegetation is noted on the tricuspid valve. Suggestive of endocarditis.                 |
|Pulmonic Valve                                                                            |
|Structurally normal pulmonic valve without significant stenosis or                        |
|regurgitation.                                                                           |
 
|Left Atrium                                                                               |
|Normal left atrium.                                                                       |
|Left Ventricle                                                                            |
|Left ventricle is normal in size and function. Ejection fraction is                       |
|estimated at 65 %.                                                                        |
|Grade 1 left ventricular diastolic dysfunction.                                           |
|Right Atrium                                                                              |
|Normal right atrium.                                                                      |
|Right Ventricle                                                                           |
|Normal right ventricular structure and function.                                          |
|Pericardial Effusion                                                                      |
|No evidence of pericardial effusion.                                                      |
|                                                                                          |
|Miscellaneous                                                                            |
|Normal aortic root.                                                                       |
|The IVC appears normal.                                                                   |
|                                                                                          |
|Valves                                                                                   |
|                                                                                          |
| Mitral Valve                                                                             |
|                                                                                          |
| Peak E-Wave: 0.6 m/s                                                                     |
| Peak A-Wave: 0.83 m/s                                                                    |
|                                                                                          |
| Tissue Doppler                                                                           |
|                                                                                          |
| Septal e' Velocity: 0.06 m/s                                                             |
| Septal E/e' Ratio:9.56                                                                   |
|                                                                                          |
| Aortic Valve                                                                             |
|                                                                                          |
|     Mean Gradient: 2.05 mmHg                                                             |
|                                                                                          |
| LVOT                                                                                     |
|                                                                                          |
| Peak Velocity: 1.07 m/s                                                                  |
|                                                                                          |
|Structures                                                                               |
|                                                                                          |
| Left Atrium                                                                              |
|                                                                                          |
| LA A/P Dimension: 3.4 cm                   LA Area: 11.03 cm^2                           |
| LA Vol/BSA Index: 11 mL/m^2                LA Volume: 18.71 ml                           |
|                                                                                          |
| Left Ventricle                                                                           |
|                                                                                          |
| Diastolic Dimension: 4.2 cm         Systolic Dimension: 2.77 cm                          |
| Septum Diastolic: 1 cm                                                                   |
| PW Diastolic: 1 cm                                                                       |
| EF Calculated: 70%                                                                       |
|                                                                                          |
| Miscellaneous                                                                            |
|                                                                                          |
| Aorta                                                                                    |
|                                                                                          |
| Aortic Root: 3.33 cm                                                                     |
| Ascending Aorta: 3.58 cm                                                                 |
+------------------------------------------------------------------------------------------+
 
 
 
 
+----------------------+---------------------+--------------------+----------------+
| Performing           | Address             | City/State/Zipcode | Phone Number   |
| Organization         |                     |                    |                |
+----------------------+---------------------+--------------------+----------------+
|   [facility]     |   [address] |   [place], [place]  |   [phone] |
| [facility]  |                     | [id number]              |                |
| - IMAGING            |                     |                    |                |
+----------------------+---------------------+--------------------+----------------+
 ECG 12 lead (2016  1:55 PM PDT)
 
+-------------+--------------------------+-----------+------------+--------------+
| Component   | Value                    | Ref Range | Performed  | Pathologist  |
|             |                          |           | At         | Signature    |
+-------------+--------------------------+-----------+------------+--------------+
| VENTRICULAR | 84                       | BPM       | [facility]  |              |
|  RATE EKG   |                          |           |            |              |
+-------------+--------------------------+-----------+------------+--------------+
| ATRIAL RATE | 84                       | BPM       | [facility]  |              |
+-------------+--------------------------+-----------+------------+--------------+
| P-R         | 134                      | ms        | [facility]  |              |
| INTERVAL    |                          |           |            |              |
+-------------+--------------------------+-----------+------------+--------------+
| QRS         | 130                      | ms        | [facility]  |              |
| DURATION    |                          |           |            |              |
+-------------+--------------------------+-----------+------------+--------------+
| Q-T         | 392                      | ms        | [facility]  |              |
| INTERVAL    |                          |           |            |              |
+-------------+--------------------------+-----------+------------+--------------+
| Q-T         | 463                      | ms        | [facility]  |              |
| INTERVAL    |                          |           |            |              |
| (CORRECTED) |                          |           |            |              |
+-------------+--------------------------+-----------+------------+--------------+
| P WAVE AXIS | 68                       | degrees   | [facility]  |              |
+-------------+--------------------------+-----------+------------+--------------+
| QRS AXIS    | 10                       | degrees   | [facility]  |              |
+-------------+--------------------------+-----------+------------+--------------+
| T AXIS      | 59                       | degrees   | [facility]  |              |
+-------------+--------------------------+-----------+------------+--------------+
| INTERPRETAT | Normal sinus rhythmRight |           | [facility]  |              |
| ION TEXT    |  bundle branch           |           |            |              |
|             | blockAbnormal ECGNo      |           |            |              |
|             | previous ECGs            |           |            |              |
|             | availableConfirmed by    |           |            |              |
|             | CYNTHIA THOMSON MD     |           |            |              |
|             | ([id number]) on 2016      |           |            |              |
|             | 10:00:40 AM              |           |            |              |
+-------------+--------------------------+-----------+------------+--------------+
 
 
 
+----------+
| Specimen |
+----------+
|          |
+----------+
 
 
 
+----------------------------------------------------------+--------------+
 
| Narrative                                                | Performed At |
+----------------------------------------------------------+--------------+
|   This result has an attachment that is not available.   |              |
+----------------------------------------------------------+--------------+
 
 
 
+--------------+---------+--------------------+--------------+
| Performing   | Address | City/State/Zipcode | Phone Number |
| Organization |         |                    |              |
+--------------+---------+--------------------+--------------+
|   [facility]  |         |                    |              |
+--------------+---------+--------------------+--------------+
 documented in this encounter
 
 Visit Diagnoses
 
 
+--------------------------------------------------------------------+
| Diagnosis                                                          |
+--------------------------------------------------------------------+
|   RBBB - Primary  Right bundle branch block                        |
+--------------------------------------------------------------------+
|   Pre-op exam  Preoperative examination, unspecified               |
+--------------------------------------------------------------------+
|   Abnormal EKG  Nonspecific abnormal electrocardiogram (ECG) (EKG) |
+--------------------------------------------------------------------+
 documented in this encounter

## 2020-08-02 NOTE — XMS
Encounter Summary
  Created on: 2020
 
 John Virginia Su
 External Reference #: [id number]
 : 42
 Sex: Female
 
 Demographics
 
 
+-----------------------+----------------------+
| Address               | [address]    |
|                       | [place]  [address] |
+-----------------------+----------------------+
| Home Phone            | [phone]      |
+-----------------------+----------------------+
| Preferred Language    | Unknown              |
+-----------------------+----------------------+
| Marital Status        |              |
+-----------------------+----------------------+
| Scientologist Affiliation | Unknown              |
+-----------------------+----------------------+
| Race                  | Unknown              |
+-----------------------+----------------------+
| Ethnic Group          | Unknown              |
+-----------------------+----------------------+
 
 
 Author
 
 
+--------------+--------------------------------------------+
| Author       | [facility]  |
|              | [facility]                                |
+--------------+--------------------------------------------+
| Organization | [facility]  |
|              | [facility]                                |
+--------------+--------------------------------------------+
| Address      | Unknown                                    |
+--------------+--------------------------------------------+
| Phone        | Unavailable                                |
+--------------+--------------------------------------------+
 
 
 
 Support
 
 
+---------------+--------------+-------------------+-----------------+
| Name          | Relationship | Address           | Phone           |
+---------------+--------------+-------------------+-----------------+
| Nawaf Carrera | ECON         | [address]  | [phone] |
|               |              | [place]   |                 |
|               |              | [address]             |                 |
+---------------+--------------+-------------------+-----------------+
 
 
 
 
 Care Team Providers
 
 
+----------------------------+------+-----------------+
| Care Team Member Name      | Role | Phone           |
+----------------------------+------+-----------------+
| Carmine Gomez  | PCP  | [phone] |
| MD                         |      |                 |
+----------------------------+------+-----------------+
 
 
 
 Reason for Referral
 Diagnostic/Screening (Routine)
 
+--------+--------+-----------+--------------+--------------+---------------+
| Status | Reason | Specialty | Diagnoses /  | Referred By  | Referred To   |
|        |        |           | Procedures   | Contact      | Contact       |
+--------+--------+-----------+--------------+--------------+---------------+
| Closed |        | Radiology |   Diagnoses  |   [place], |   [facility]    |
|        |        |           |  Pre-op exam |  MD Cynthia  | [address]  |
|        |        |           |   Abnormal   |  [address]    |  [place], |
|        |        |           | EKG          | [address]   |  [place]           |
|        |        |           | Procedures   | [place], | [address]    |
|        |        |           | ECHO         |  [place] [address]    | Phone:        |
|        |        |           | Complete  [place] | Phone:       | [phone]  |
|        |        |           |  ECHO HEART  | [phone] |  Fax:         |
|        |        |           | XTHORACIC,CO |   Fax:       | [phone]  |
|        |        |           | [address]LETE [address]     | [phone] |               |
|        |        |           | DOPPLER  [place]  |              |               |
|        |        |           | ECHO HEART   |              |               |
|        |        |           | XTHORACIC,CO |              |               |
|        |        |           | MPLETE, W/O  |              |               |
|        |        |           | DOPPLER      |              |               |
+--------+--------+-----------+--------------+--------------+---------------+
 
 
 
 
 Reason for Visit
 Diagnostic/Screening (Routine)
 
+--------+--------+-----------+--------------+--------------+---------------+
| Status | Reason | Specialty | Diagnoses /  | Referred By  | Referred To   |
|        |        |           | Procedures   | Contact      | Contact       |
+--------+--------+-----------+--------------+--------------+---------------+
| Closed |        | Radiology |   Diagnoses  |   [place], |   [facility]    |
|        |        |           |  Pre-op exam |  MD Cynthia  | [address]  |
|        |        |           |   Abnormal   |  [address]    |  [place], |
|        |        |           | EKG          | [address]   |  [place]           |
|        |        |           | Procedures   | [place], | [address]    |
|        |        |           | ECHO         |  [place] [address]    | Phone:        |
|        |        |           | Complete  [place] | Phone:       | [phone]  |
|        |        |           |  ECHO HEART  | [phone] |  Fax:         |
|        |        |           | XTHORACIC,CO |   Fax:       | [phone]  |
|        |        |           | MPLETE W     | [phone] |               |
|        |        |           | DOPPLER  [place]  |              |               |
|        |        |           | ECHO HEART   |              |               |
|        |        |           | XTHORACIC,CO |              |               |
 
|        |        |           | MPLETE, W/O  |              |               |
|        |        |           | DOPPLER      |              |               |
+--------+--------+-----------+--------------+--------------+---------------+
 
 
 
 
 Encounter Details
 
 
+--------+-----------+----------------------+----------------------+---------------+
| Date   | Type      | Department           | Care Team            | Description   |
+--------+-----------+----------------------+----------------------+---------------+
| / | Hospital  |   [facility] |   Cynthia Thomson, | Pre-op exam;  |
| 2016   | Encounter |  [facility]  [address] |  MD  [address] | Abnormal EKG  |
|        |           |  [address]  [place]       |  [address]  [place],   |               |
|        |           | [place] [address] | [place] [address]             |               |
|        |           |   [phone]       | [phone]         |               |
|        |           |                      | [phone] (Fax)   |               |
|        |           |                      | Kayleigh Tompkins,  |               |
|        |           |                      | Technologist         |               |
+--------+-----------+----------------------+----------------------+---------------+
 
 
 
 Social History
 
 
+---------------+-------+-----------+--------+------+
| Tobacco Use   | Types | Packs/Day | Years  | Date |
|               |       |           | Used   |      |
+---------------+-------+-----------+--------+------+
| Former Smoker |       |           |        |      |
+---------------+-------+-----------+--------+------+
 
 
 
+---------------------+---+---+---+
| Smokeless Tobacco:  |   |   |   |
| Never Used          |   |   |   |
+---------------------+---+---+---+
 
 
 
+------------------------+
| Comments: quit in 2000 |
+------------------------+
 
 
 
+-------------+----------------------+---------+----------+
| Alcohol Use | Drinks/Week          | oz/Week | Comments |
+-------------+----------------------+---------+----------+
| No          |   0 Standard drinks  | 0.0     |          |
|             | or equivalent        |         |          |
+-------------+----------------------+---------+----------+
 
 
 
+------------------+---------------+
 
| Sex Assigned at  | Date Recorded |
| Birth            |               |
+------------------+---------------+
| Not on file      |               |
+------------------+---------------+
 documented as of this encounter
 
 Medications at Time of Discharge
 
 
+---------------------+----------------------+-----------+---------+--------+----------+
| Medication          | Sig                  | Dispensed | Refills | Start  | End Date |
|                     |                      |           |         | Date   |          |
+---------------------+----------------------+-----------+---------+--------+----------+
|   alendronate       | Take 70 mg by mouth  |           | 0       |        |          |
| (FOSAMAX) 70 mg     | Once a week.         |           |         |        |          |
| tablet              |                      |           |         |        |          |
+---------------------+----------------------+-----------+---------+--------+----------+
|   aspirin 81 MG     | Take 81 mg by mouth  |           | 0       |        |          |
| tablet              | Daily.               |           |         |        |          |
+---------------------+----------------------+-----------+---------+--------+----------+
|   Cholecalciferol   | Take 5,000 Units by  |           | 0       |        |          |
| (VITAMIN D-3) 2000  | mouth Daily.         |           |         |        |          |
| units CAPS          |                      |           |         |        |          |
+---------------------+----------------------+-----------+---------+--------+----------+
|   cyanocobalamin    | Take 5,000 mcg by    |           | 0       |        |          |
| (VITAMIN B-12) 500  | mouth Daily.         |           |         |        |          |
| mcg tablet          |                      |           |         |        |          |
+---------------------+----------------------+-----------+---------+--------+----------+
|   ranitidine        | Take 300 mg by mouth |           | 0       |        |          |
| (ZANTAC) 300 MG     |  nightly.            |           |         |        |          |
| capsule             |                      |           |         |        |          |
+---------------------+----------------------+-----------+---------+--------+----------+
 documented as of this encounter
 
 Plan of Treatment
 Not on filedocumented as of this encounter
 
 Procedures
 
 
+----------------+--------+-------------+----------------------+----------------------+
| Procedure Name | Priori | Date/Time   | Associated Diagnosis | Comments             |
|                | ty     |             |                      |                      |
+----------------+--------+-------------+----------------------+----------------------+
| ECHO COMPLETE  | Routin | 2016  |   Pre-op exam        |   Results for this   |
|                | e      |  8:50 AM    | Abnormal EKG         | procedure are in the |
|                |        | PDT         |                      |  results section.    |
+----------------+--------+-------------+----------------------+----------------------+
| LVEF VALUE     | Routin | 2016  |                      |   Results for this   |
|                | e      |             |                      | procedure are in the |
|                |        |             |                      |  results section.    |
+----------------+--------+-------------+----------------------+----------------------+
 documented in this encounter
 
 Results
 ECHO Complete (2016  8:50 AM PDT)
 
+----------+
| Specimen |
 
+----------+
|          |
+----------+
 
 
 
+-----------------------------------------------------------------------------+-------------
----+
| Narrative                                                                   | Performed At
    |
+-----------------------------------------------------------------------------+-------------
----+
|   This result has an attachment that is not available.  [facility]       |   [facility]
    |
| Echocardiography Report (TTE)  Demographics  Patient Name    JOHN PORTILLO    
    |
| [place] Room Number                        A  Patient Number               [facility]  |
| [id number]         Date of Study             2016  Visit Number      | - IMAGING   
    |
|     [id number]  Accession         [id number]          Referring            |             
    |
| Physician    ALIX MARIE Number  Date of Birth   1940            |             
    |
|     Sonographer                SENA DONN PATEL                          |             
    |
|                                                                [place]       |             
    |
| [place],                                                                 |             
    |
|                         [place]  Age                  76 year(s)                 |             
    |
| Interpreting               ALIX MARIE                                 |             
    |
|                       Cardiologist               CYNTHIA THOMSON MD       |             
    |
|  Gender             Female                Nurse Procedure Type of           |             
    |
| Study  TTE procedure: ECHO Complete. Procedure dateDate:                    |             
    |
| 2016Start: 08:09 AM Study Location: Echo LabIndications:              |             
    |
| Abnormal .31/R94.31 and preop Exam [id number].Patient            |             
    |
| Status: RoutineHeight: 63 inchesWeight: 163 poundsBSA: 1.77 m^2BMI:         |             
    |
| 28.87 kg/m^2Rhythm: Normal Sinus RhythmHR: 78 bpm ConclusionsSummary1.      |             
    |
|  Normal left ventricular size, wall thickness and motion. Preserved         |             
    |
| leftventricular systolic function. LVEF is 65%.2. Grade 1 left              |             
    |
| ventricular diastolic dysfunction.3. Normal valvular structure.4.           |             
    |
| Normal right-sided pressure.5. Normal IVC with normal respiratory           |             
    |
| collapse.                                                                   |             
    |
| Signature-------------------------------------------------------------      |             
    |
 
| --------------- Electronically signed by CYNTHIA THOMSON                  |             
    |
| MD(Interpreting physician) on 2016 07:47                              |             
    |
| AM--------------------------------------------------------------------      |             
    |
| -------- FindingsMitral ValveStructurally normal mitral valve without       |             
    |
| significant stenosis orregurgitation.Aortic ValveAortic valve is            |             
    |
| trileaflet without significant stenosis or regurgitation.Tricuspid          |             
    |
| ValveA vegetation is noted on the tricuspid valve. Suggestive of            |             
    |
| endocarditis.Pulmonic ValveStructurally normal pulmonic valve without       |             
    |
| significant stenosis orregurgitation.Left AtriumNormal left                 |             
    |
| atrium.Left VentricleLeft ventricle is normal in size and function.         |             
    |
| Ejection fraction isestimated at 65 %.Grade 1 left ventricular              |             
    |
| diastolic dysfunction.Right AtriumNormal right atrium.Right                 |             
    |
| VentricleNormal right ventricular structure and function.Pericardial        |             
    |
| EffusionNo evidence of pericardial effusion. MiscellaneousNormal            |             
    |
| aortic root.The IVC appears normal. Valves  Mitral Valve  Peak E-Wave:      |             
    |
|  0.6 m/s Peak A-Wave: 0.83 m/s  Tissue Doppler  Septal e' Velocity:         |             
    |
| 0.06 m/s Septal E/e' Ratio:9.56  Aortic Valve        Mean Gradient:         |             
    |
| 2.05 mmHg  LVOT  Peak Velocity: 1.07 m/s Structures  Left Atrium  LA        |             
    |
| A/P Dimension: 3.4 cm                            LA Area: 11.03 cm^2        |             
    |
| LA Vol/BSA Index: 11 mL/m^2                        LA Volume: 18.71 ml      |             
    |
|   Left Ventricle  Diastolic Dimension: 4.2 cm             Systolic          |             
    |
| Dimension: 2.77 cm Septum Diastolic: 1 cm PW Diastolic: 1 cm EF             |             
    |
| Calculated: 70%  Miscellaneous  Aorta  Aortic Root: 3.33 cm Ascending       |             
    |
| Aorta: 3.58 cm                                                              |             
    |
|----------------------------------------------------------------------------  |            
     |
| Electronically signed by CYNTHIA THOMSON MD(Interpreting physician) on    |             
    |
| 2016 07:47 AM                                                         |             
    |
|----------------------------------------------------------------------------  |            
     |
|                                                                             |             
    |
|Findings                                                                    |              
   |
 
|Mitral Valve                                                                 |             
    |
|Structurally normal mitral valve without significant stenosis or             |             
    |
|regurgitation.                                                              |              
   |
|Aortic Valve                                                                 |             
    |
|Aortic valve is trileaflet without significant stenosis or regurgitation.    |             
    |
|Tricuspid Valve                                                              |             
    |
|A vegetation is noted on the tricuspid valve. Suggestive of endocarditis.    |             
    |
|Pulmonic Valve                                                               |             
    |
|Structurally normal pulmonic valve without significant stenosis or           |             
    |
|regurgitation.                                                              |              
   |
|Left Atrium                                                                  |             
    |
|Normal left atrium.                                                          |             
    |
|Left Ventricle                                                               |             
    |
|Left ventricle is normal in size and function. Ejection fraction is          |             
    |
|estimated at 65 %.                                                           |             
    |
|Grade 1 left ventricular diastolic dysfunction.                              |             
    |
|Right Atrium                                                                 |             
    |
|Normal right atrium.                                                         |             
    |
|Right Ventricle                                                              |             
    |
|Normal right ventricular structure and function.                             |             
    |
|Pericardial Effusion                                                         |             
    |
|No evidence of pericardial effusion.                                         |             
    |
|                                                                             |             
    |
|Miscellaneous                                                               |              
   |
|Normal aortic root.                                                          |             
    |
|The IVC appears normal.                                                      |             
    |
|                                                                             |             
    |
|Valves                                                                      |              
   |
|                                                                             |             
    |
| Mitral Valve                                                                |             
    |
 
|                                                                             |             
    |
| Peak E-Wave: 0.6 m/s                                                        |             
    |
| Peak A-Wave: 0.83 m/s                                                       |             
    |
|                                                                             |             
    |
| Tissue Doppler                                                              |             
    |
|                                                                             |             
    |
| Septal e' Velocity: 0.06 m/s                                                |             
    |
| Septal E/e' Ratio:9.56                                                      |             
    |
|                                                                             |             
    |
| Aortic Valve                                                                |             
    |
|                                                                             |             
    |
|       Mean Gradient: 2.05 mmHg                                              |             
    |
|                                                                             |             
    |
| LVOT                                                                        |             
    |
|                                                                             |             
    |
| Peak Velocity: 1.07 m/s                                                     |             
    |
|                                                                             |             
    |
|Structures                                                                  |              
   |
|                                                                             |             
    |
| Left Atrium                                                                 |             
    |
|                                                                             |             
    |
| LA A/P Dimension: 3.4 cm                            LA Area: 11.03 cm^2     |             
    |
| LA Vol/BSA Index: 11 mL/m^2                        LA Volume: 18.71 ml      |             
    |
|                                                                             |             
    |
| Left Ventricle                                                              |             
    |
|                                                                             |             
    |
| Diastolic Dimension: 4.2 cm             Systolic Dimension: 2.77 cm         |             
    |
| Septum Diastolic: 1 cm                                                      |             
    |
| PW Diastolic: 1 cm                                                          |             
    |
| EF Calculated: 70%                                                          |             
    |
 
|                                                                             |             
    |
| Miscellaneous                                                               |             
    |
|                                                                             |             
    |
| Aorta                                                                       |             
    |
|                                                                             |             
    |
| Aortic Root: 3.33 cm                                                        |             
    |
| Ascending Aorta: 3.58 cm                                                    |             
    |
|                                                                             |             
    |
+-----------------------------------------------------------------------------+-------------
----+
 
 
 
+------------------------------------------------------------------------------------------+
| Procedure Note                                                                           |
+------------------------------------------------------------------------------------------+
|   Mason, Rad Results In - 2016  7:48 AM PDT  Transthoracic Echocardiography Report   |
| (TTE) Demographics Patient Name   [facility] Room Number                A Patient  |
| Number [id number]      Date of Study         2016 Visit Number   [id number]      |
| Accession      [id number]       Referring Physician   ALIX MARIE Number Date of    |
| Birth  1940       Sonographer           SENA PATEL                        |
|                                 BHANU HAMM,                                       |
|                  US Age            76 year(s)       Interpreting          ALIX      |
| CYNTHIA                                 Cardiologist          CYNTHIA THOMSON MD Gender |
|          Female           NurseProcedureType of Study TTE procedure: ECHO                |
| Complete.Procedure dateDate: 2016Start: 08:09 AMStudy Location: Echo               |
| LabIndications: Abnormal .31/R94.31 and preop Exam [id number]/[id number].Patient Status: |
|  RoutineHeight: 63 inchesWeight: 163 poundsBSA: 1.77 m^2BMI: 28.87 kg/m^2Rhythm: Normal  |
| Sinus RhythmHR: 78 bpmConclusionsSummary1. Normal left ventricular size, wall thickness  |
| and motion. Preserved leftventricular systolic function. LVEF is 65%.2. Grade 1 left     |
| ventricular diastolic dysfunction.3. Normal valvular structure.4. Normal right-sided     |
| pressure.5. Normal IVC with normal respiratory                                           |
| collapse.Signature---------------------------------------------------------------------- |
| ------ Electronically signed by CYNTHIA THOMSON MD(Interpreting physician) on          |
| 2016 07:47                                                                         |
| AM----------------------------------------------------------------------------FindingsMi |
| tral ValveStructurally normal mitral valve without significant stenosis                  |
| orregurgitation.Aortic ValveAortic valve is trileaflet without significant stenosis or   |
| regurgitation.Tricuspid ValveA vegetation is noted on the tricuspid valve. Suggestive of |
|  endocarditis.Pulmonic ValveStructurally normal pulmonic valve without significant       |
| stenosis orregurgitation.Left AtriumNormal left atrium.Left VentricleLeft ventricle is   |
| normal in size and function. Ejection fraction isestimated at 65 %.Grade 1 left          |
| ventricular diastolic dysfunction.Right AtriumNormal right atrium.Right VentricleNormal  |
| right ventricular structure and function.Pericardial EffusionNo evidence of pericardial  |
| effusion.MiscellaneousNormal aortic root.The IVC appears normal.Valves Mitral Valve Peak |
|  E-Wave: 0.6 m/s Peak A-Wave: 0.83 m/s Tissue Doppler Septal e' Velocity: 0.06 m/s       |
| Septal E/e' Ratio:9.56 Aortic Valve     Mean Gradient: 2.05 mmHg LVOT Peak Velocity:     |
| 1.07 m/sStructures Left Atrium LA A/P Dimension: 3.4 cm                   LA Area: 11.03 |
|  cm^2 LA Vol/BSA Index: 11 mL/m^2                LA Volume: 18.71 ml Left Ventricle      |
| Diastolic Dimension: 4.2 cm         Systolic Dimension: 2.77 cm Septum Diastolic: 1 cm   |
| PW Diastolic: 1 cm EF Calculated: 70% Miscellaneous Aorta Aortic Root: 3.33 cm Ascending |
|  Aorta: 3.58 cm                                                                          |
 
|Rhythm: Normal Sinus RhythmHR: 78 bpm                                                     |
|                                                                                          |
|Conclusions                                                                              |
|Summary                                                                                  |
|1. Normal left ventricular size, wall thickness and motion. Preserved left                |
|ventricular systolic function. LVEF is 65%.                                               |
|2. Grade 1 left ventricular diastolic dysfunction.                                        |
|3. Normal valvular structure.                                                             |
|4. Normal right-sided pressure.                                                           |
|5. Normal IVC with normal respiratory collapse.                                           |
|                                                                                          |
|Signature                                                                                |
|----------------------------------------------------------------------------               
|
| Electronically signed by CYNTHIA THOMSON MD(Interpreting physician) on                 |
| 2016 07:47 AM                                                                      |
|----------------------------------------------------------------------------               
|
|                                                                                          |
|Findings                                                                                 |
|Mitral Valve                                                                              |
|Structurally normal mitral valve without significant stenosis or                          |
|regurgitation.                                                                           |
|Aortic Valve                                                                              |
|Aortic valve is trileaflet without significant stenosis or regurgitation.                 |
|Tricuspid Valve                                                                           |
|A vegetation is noted on the tricuspid valve. Suggestive of endocarditis.                 |
|Pulmonic Valve                                                                            |
|Structurally normal pulmonic valve without significant stenosis or                        |
|regurgitation.                                                                           |
|Left Atrium                                                                               |
|Normal left atrium.                                                                       |
|Left Ventricle                                                                            |
|Left ventricle is normal in size and function. Ejection fraction is                       |
|estimated at 65 %.                                                                        |
|Grade 1 left ventricular diastolic dysfunction.                                           |
|Right Atrium                                                                              |
|Normal right atrium.                                                                      |
|Right Ventricle                                                                           |
|Normal right ventricular structure and function.                                          |
|Pericardial Effusion                                                                      |
|No evidence of pericardial effusion.                                                      |
|                                                                                          |
|Miscellaneous                                                                            |
|Normal aortic root.                                                                       |
|The IVC appears normal.                                                                   |
|                                                                                          |
|Valves                                                                                   |
|                                                                                          |
| Mitral Valve                                                                             |
|                                                                                          |
| Peak E-Wave: 0.6 m/s                                                                     |
| Peak A-Wave: 0.83 m/s                                                                    |
|                                                                                          |
| Tissue Doppler                                                                           |
|                                                                                          |
| Septal e' Velocity: 0.06 m/s                                                             |
| Septal E/e' Ratio:9.56                                                                   |
|                                                                                          |
| Aortic Valve                                                                             |
 
|                                                                                          |
|     Mean Gradient: 2.05 mmHg                                                             |
|                                                                                          |
| LVOT                                                                                     |
|                                                                                          |
| Peak Velocity: 1.07 m/s                                                                  |
|                                                                                          |
|Structures                                                                               |
|                                                                                          |
| Left Atrium                                                                              |
|                                                                                          |
| LA A/P Dimension: 3.4 cm                   LA Area: 11.03 cm^2                           |
| LA Vol/BSA Index: 11 mL/m^2                LA Volume: 18.71 ml                           |
|                                                                                          |
| Left Ventricle                                                                           |
|                                                                                          |
| Diastolic Dimension: 4.2 cm         Systolic Dimension: 2.77 cm                          |
| Septum Diastolic: 1 cm                                                                   |
| PW Diastolic: 1 cm                                                                       |
| EF Calculated: 70%                                                                       |
|                                                                                          |
| Miscellaneous                                                                            |
|                                                                                          |
| Aorta                                                                                    |
|                                                                                          |
| Aortic Root: 3.33 cm                                                                     |
| Ascending Aorta: 3.58 cm                                                                 |
+------------------------------------------------------------------------------------------+
 
 
 
+----------------------+---------------------+--------------------+----------------+
| Performing           | Address             | City/State/Zipcode | Phone Number   |
| Organization         |                     |                    |                |
+----------------------+---------------------+--------------------+----------------+
|   [facility]     |   [address] |   [place], [place]  |   [phone] |
| [facility]  |                     | [address]              |                |
| - IMAGING            |                     |                    |                |
+----------------------+---------------------+--------------------+----------------+
 LVEF VALUE (2016)
 
+-------------+-------+-----------+------------+--------------+
| Component   | Value | Ref Range | Performed  | Pathologist  |
|             |       |           | At         | Signature    |
+-------------+-------+-----------+------------+--------------+
| LVEF-TTE    | 65    |           |            |              |
| TRANSTHORAC |       |           |            |              |
| IC ECHO     |       |           |            |              |
+-------------+-------+-----------+------------+--------------+
 documented in this encounter
 
 Visit Diagnoses
 
 
+--------------------------------------------------------------------+
| Diagnosis                                                          |
+--------------------------------------------------------------------+
|   Pre-op exam  Preoperative examination, unspecified               |
+--------------------------------------------------------------------+
|   Abnormal EKG  Nonspecific abnormal electrocardiogram (ECG) (EKG) |
 
+--------------------------------------------------------------------+
 documented in this encounter

## 2020-08-02 NOTE — XMS
Encounter Summary
  Created on: 2020
 
 Flip Virginia Su
 External Reference #: [id number]
 : 42
 Sex: Female
 
 Demographics
 
 
+-----------------------+----------------------+
| Address               | [address]    |
|                       | [place]  [address] |
+-----------------------+----------------------+
| Home Phone            | [phone]      |
+-----------------------+----------------------+
| Preferred Language    | Unknown              |
+-----------------------+----------------------+
| Marital Status        |              |
+-----------------------+----------------------+
| [employer] Affiliation | Unknown              |
+-----------------------+----------------------+
| Race                  | Unknown              |
+-----------------------+----------------------+
| Ethnic Group          | Unknown              |
+-----------------------+----------------------+
 
 
 Author
 
 
+--------------+--------------------------------------------+
| Author       | [facility]  |
|              | and [facility]                                |
+--------------+--------------------------------------------+
| Organization | [facility]  |
|              | and [facility]                                |
+--------------+--------------------------------------------+
| Address      | Unknown                                    |
+--------------+--------------------------------------------+
| Phone        | Unavailable                                |
+--------------+--------------------------------------------+
 
 
 
 Support
 
 
+---------------+--------------+-------------------+-----------------+
| Name          | Relationship | Address           | Phone           |
+---------------+--------------+-------------------+-----------------+
| Nawaf Carrera | E[employer]         | [address]  | [phone] |
|               |              | [place]   |                 |
|               |              | [address]             |                 |
+---------------+--------------+-------------------+-----------------+
 
 
 
 
 Care Team Providers
 
 
+----------------------------+------+-----------------+
| Care Team Member Name      | Role | Phone           |
+----------------------------+------+-----------------+
| Carmine Gomez  | PCP  | [phone] |
| MD                         |      |                 |
+----------------------------+------+-----------------+
 
 
 
 Encounter Details
 
 
+--------+----------+----------------------+----------------------+----------------------+
| Date   | Type     | Department           | Care Team            | Description          |
+--------+----------+----------------------+----------------------+----------------------+
| / | Abstract |   [facility]          |   Paul Thomson, | Bundle branch block, |
| 2016   |          | CARDIOLOGY  [address]    |  MD  [address] [place] |  right (Primary Dx); |
|        |          | [place]  [place], |  [address]  [place],   |  Cystitis, subacute; |
|        |          |  [place] [address]       | [place] [address]             |  Essential           |
|        |          | [phone]         | [phone]         | hypertension,        |
|        |          |                      | [phone] (Fax)   | benign;              |
|        |          |                      |                      | Gastroesophageal     |
|        |          |                      |                      | reflux disease,      |
|        |          |                      |                      | esophagitis presence |
|        |          |                      |                      |  not specified;      |
|        |          |                      |                      | Right                |
|        |          |                      |                      | nephrolithiasis;     |
|        |          |                      |                      | Osteoporosis,        |
|        |          |                      |                      | postmenopausal;      |
|        |          |                      |                      | Vitamin D deficiency |
+--------+----------+----------------------+----------------------+----------------------+
 
 
 
 Social History
 
 
+----------------+-------+-----------+--------+------+
| Tobacco Use    | Types | Packs/Day | Years  | Date |
|                |       |           | Used   |      |
+----------------+-------+-----------+--------+------+
| Never Assessed |       |           |        |      |
+----------------+-------+-----------+--------+------+
 
 
 
+------------------+---------------+
| Sex Assigned at  | Date Recorded |
| Birth            |               |
+------------------+---------------+
| Not on file      |               |
+------------------+---------------+
 documented as of this encounter
 
 Plan of Treatment
 Not on filedocumented as of this encounter
 
 
 Visit Diagnoses
 
 
+-----------------------------------------------------------------------+
| Diagnosis                                                             |
+-----------------------------------------------------------------------+
|   Bundle branch block, right - Primary  Right bundle branch block     |
+-----------------------------------------------------------------------+
|   Cystitis, subacute  Other chronic cystitis                          |
+-----------------------------------------------------------------------+
|   Essential hypertension, benign                                      |
+-----------------------------------------------------------------------+
|   Gastroesophageal reflux disease, esophagitis presence not specified |
+-----------------------------------------------------------------------+
|   Right nephrolithiasis                                               |
+-----------------------------------------------------------------------+
|   Osteoporosis, postmenopausal  Senile osteoporosis                   |
+-----------------------------------------------------------------------+
|   Vitamin D deficiency  Unspecified vitamin D deficiency              |
+-----------------------------------------------------------------------+
 documented in this encounter

## 2020-08-02 NOTE — XMS
Encounter Summary
  Created on: 2020
 
 Flip Virginia Su
 External Reference #: [id number]
 : 42
 Sex: Female
 
 Demographics
 
 
+-----------------------+----------------------+
| Address               | [address]    |
|                       | [place]  [address] |
+-----------------------+----------------------+
| Home Phone            | [phone]      |
+-----------------------+----------------------+
| Preferred Language    | Unknown              |
+-----------------------+----------------------+
| Marital Status        |              |
+-----------------------+----------------------+
| Druze Affiliation | Unknown              |
+-----------------------+----------------------+
| Race                  | Unknown              |
+-----------------------+----------------------+
| Ethnic Group          | Unknown              |
+-----------------------+----------------------+
 
 
 Author
 
 
+--------------+--------------------------------------------+
| Author       | [facility]  |
|              | and [facility]                                |
+--------------+--------------------------------------------+
| Organization | [facility]  |
|              | and [facility]                                |
+--------------+--------------------------------------------+
| Address      | Unknown                                    |
+--------------+--------------------------------------------+
| Phone        | Unavailable                                |
+--------------+--------------------------------------------+
 
 
 
 Support
 
 
+---------------+--------------+-------------------+-----------------+
| Name          | Relationship | Address           | Phone           |
+---------------+--------------+-------------------+-----------------+
| Nawaf Carrera | ECON         | [address]  | [phone] |
|               |              | [place]   |                 |
|               |              | [id number]             |                 |
+---------------+--------------+-------------------+-----------------+
 
 
 
 
 Care Team Providers
 
 
+----------------------------+------+-----------------+
| Care Team Member Name      | Role | Phone           |
+----------------------------+------+-----------------+
| Carmine Gomez PCP  | [phone] |
| MD                         |      |                 |
+----------------------------+------+-----------------+
 
 
 
 Reason for Visit
 
 
+--------------+---------------------------------------------+
| Reason       | Comments                                    |
+--------------+---------------------------------------------+
| Follow-up    |                                             |
+--------------+---------------------------------------------+
| Heart Murmur |                                             |
+--------------+---------------------------------------------+
| Results      | Stress Test and Echocardiogram on 2016 |
+--------------+---------------------------------------------+
 
 
 
 Encounter Details
 
 
+--------+---------+----------------------+----------------------+----------------------+
| Date   | Type    | Department           | Care Team            | Description          |
+--------+---------+----------------------+----------------------+----------------------+
| / | Office  |   [facility]          |   Paul Thomson, | Abnormal EKG         |
| 2016   | Visit   | CARDIOLOGY  [address]    |  MD  [address] | (Primary Dx); Other  |
|        |         | [place]  [place], |  [address].  [place],   | chest pain           |
|        |         |  [place] [address]       | [place] [address]             |                      |
|        |         | [phone]         | [phone]         |                      |
|        |         |                      | [phone] (Fax)   |                      |
+--------+---------+----------------------+----------------------+----------------------+
 
 
 
 Social History
 
 
+---------------+-------+-----------+--------+------+
| Tobacco Use   | Types | Packs/Day | Years  | Date |
|               |       |           | Used   |      |
+---------------+-------+-----------+--------+------+
| Former Smoker |       |           |        |      |
+---------------+-------+-----------+--------+------+
 
 
 
+---------------------+---+---+---+
| Smokeless Tobacco:  |   |   |   |
| Never Used          |   |   |   |
+---------------------+---+---+---+
 
 
 
 
+------------------------+
| Comments: quit in 2000 |
+------------------------+
 
 
 
+-------------+----------------------+---------+----------+
| Alcohol Use | Drinks/Week          | oz/Week | Comments |
+-------------+----------------------+---------+----------+
| No          |   0 Standard drinks  | 0.0     |          |
|             | or equivalent        |         |          |
+-------------+----------------------+---------+----------+
 
 
 
+------------------+---------------+
| Sex Assigned at  | Date Recorded |
| Birth            |               |
+------------------+---------------+
| Not on file      |               |
+------------------+---------------+
 documented as of this encounter
 
 Last Filed Vital Signs
 
 
+-------------------+----------------------+----------------------+-----------+
| Vital Sign        | Reading              | Time Taken           | Comments  |
+-------------------+----------------------+----------------------+-----------+
| Blood Pressure    | 130/80               | 2016  3:40 PM  | right arm |
|                   |                      | PDT                  |           |
+-------------------+----------------------+----------------------+-----------+
| Pulse             | 80                   | 2016  3:40 PM  | regular   |
|                   |                      | PDT                  |           |
+-------------------+----------------------+----------------------+-----------+
| Temperature       | -                    | -                    |           |
+-------------------+----------------------+----------------------+-----------+
| Respiratory Rate  | 16                   | 2016  3:40 PM  |           |
|                   |                      | PDT                  |           |
+-------------------+----------------------+----------------------+-----------+
| Oxygen Saturation | -                    | -                    |           |
+-------------------+----------------------+----------------------+-----------+
| Inhaled Oxygen    | -                    | -                    |           |
| Concentration     |                      |                      |           |
+-------------------+----------------------+----------------------+-----------+
| Weight            | 73.6 kg (162 lb 3.2  | 2016  3:40 PM  |           |
|                   | oz)                  | PDT                  |           |
+-------------------+----------------------+----------------------+-----------+
| Height            | 160 cm (5' 3")       | 2016  3:40 PM  |           |
|                   |                      | PDT                  |           |
+-------------------+----------------------+----------------------+-----------+
| Body Mass Index   | 28.73                | 2016  3:40 PM  |           |
|                   |                      | PDT                  |           |
+-------------------+----------------------+----------------------+-----------+
 documented in this encounter
 
 Progress Notes
 
 Paul Thomson MD - 2016  3:47 PM PDTFormatting of this note might be different f
rom the original.
   
 
 PATIENT NAME:  Laura Castelan  
 : 1940:         AGE: 76 y.o.
  PRIMARY CARE:
 Carmine Gomez MD 
 
 OUTPATIENT FOLLOW UP VISIT
 
 Date of Service: 2016 
 
 HISTORY OF PRESENT ILLNESS:
 Laura Castelan is a 76 y.o. female with a history of osteoporosis, heart murmur, COPD, 
kidney stone, tipped bladder and abnormal EKG.  She is being seen today for preop clearance 
prior to bladder surgery.
 
 She was last seen 2016 at which time she was scheduled for echocardiogram and Persantin
e stress test.  Since that time, patient had been doing pretty well from cardiac standpoint.
  There is no new specific cardiac symptoms.  There is no chest pain or chest discomfort bot
h at rest and on exertion.  Patient denies breathlessness.  There is no palpitation dizzines
s or lightheadedness.  There is no ankle or leg swelling.  Patient can sleep on one pillow a
t night without difficulty breathing.  
 
 MEDICAL, SURGICAL, AND PERSONAL HISTORY
 Past Medical, Surgical, Family, and Social History are reviewed in EPIC.
 
 CURRENT PROBLEMS
 Patient Active Problem List 
 Diagnosis 
   Bundle branch block, right 
   Cystitis, subacute 
   Essential hypertension, benign 
   GERD (gastroesophageal reflux disease) 
   Right nephrolithiasis 
   Osteoporosis, postmenopausal 
   Vitamin D deficiency 
 
 CURRENT MEDICATIONS
 Current Outpatient Prescriptions 
 Medication Sig Dispense Refill 
   alendronate (FOSAMAX) 70 mg tablet Take 70 mg by mouth Once a week.   
   aspirin 81 MG tablet Take 81 mg by mouth Daily.   
   Cholecalciferol (VITAMIN D-3) 2000 units CAPS Take 5,000 Units by mouth Daily.   
   cyanocobalamin (VITAMIN B-12) 500 mcg tablet Take 5,000 mcg by mouth Daily.   
   ranitidine (ZANTAC) 300 MG capsule Take 300 mg by mouth nightly.   
 
 No current facility-administered medications for this visit. 
  
 
 ALLERGIES
 Allergies 
 Allergen Reactions 
   Codeine Other (See Comments) 
   hallucinations 
   Doxycycline Diarrhea and Nausea And Vomiting 
   Penicillins Hives and Rash 
   Sulfa Antibiotics Hives and Rash 
 
 
 ROS
 ROS
 
 OBJECTIVE:  
 
 PHYSICAL EXAM
 /80 mmHg | Pulse 80 | Resp 16 | Ht 1.6 m (5' 3") | Wt 73.573 kg (162 lb 3.2 oz) | BMI
 28.74 kg/m2 
 Physical Exam 
 Constitutional: She appears well-developed and well-nourished. No distress. 
 Elderly female individual, without acute distress, accompanied by her son. 
 Neck: Normal carotid pulses, no hepatojugular reflux and no JVD present. Carotid bruit is n
ot present. 
 Cardiovascular: Normal rate, regular rhythm, S1 normal, S2 normal, intact distal pulses and
 normal pulses.  PMI is not displaced.  Exam reveals no gallop, no S3, no S4 and no friction
 rub.  
 Murmur (grade 1/6 holosystolic murmur along left sternal border.) heard.
 Pulses:
      Carotid pulses are 2+ on the right side, and 2+ on the left side.
      Dorsalis pedis pulses are 2+ on the right side, and 2+ on the left side. 
 Pulmonary/Chest: Effort normal and breath sounds normal. No accessory muscle usage. No resp
iratory distress. She has no wheezes. She has no rhonchi. She has no rales. 
 Minimally decreased basal bilateral lung fields. 
 Abdominal: Normal appearance, normal aorta and bowel sounds are normal. She exhibits no abd
ominal bruit. There is no hepatosplenomegaly. There is no tenderness. 
 Musculoskeletal: She exhibits edema (Trace bilateral ankle swelling.). 
 Neurological: She is alert. Gait normal. 
 Skin: Skin is warm and dry. 
 Psychiatric: She has a normal mood and affect. Her mood appears not anxious. She does not e
xhibit a depressed mood. 
 
 LAB RESULTS reviewed during visit today primarily from [facility]: 
 LIPID
 No results found for: CHOL, TRIG, HDL, LDL, CHOLHDL, LDLEX, HDLEX, TRIGEX, CHOLEX
 CHEMISTRY
 Lab Results 
 Component Value Date 
  GLUEX 111* 2015 
  NAEX 138 2015 
  KEX 3.8 2015 
  CLEX 104 2015 
  CO2EX 25 2015 
  ASTEX 16 2015 
  ALTEX 9 2015 
  EGFREX 76 2015 
  CREEX 0.75 2015 
 
 HEMATOLOGY
 Lab Results 
 Component Value Date 
  WBCEX 13.6* 2015 
  HGBEX 15.3 2015 
  HCTEX 45.6* 2015 
  PLTEX 223 2015 
  
 
 Above data and testing is reviewed this visit; testing below is historical data unless othe
rwise specified. 
 ASSESSMENT:  
 
 
 1.  Right bundle-branch block  , preop clearance prior to bladder surgery
  A. Patient was found to have a complete right bundle branch block on the preop EKG.
  B. Echocadriogram, 2016 shows normal left ventricular size, wall thickness and motion
, preserved left ventricular systolic function, LVEF is 65%, grade 1 left ventricular diasto
lic dysfunction, normal valvular structure, normal right-sided pressure, normal IVC with nor
mal respiratory collapse.
  C. Persantine Sestamibi Stress Test, 2016 shows Persantine EKG is negative, normal  P
ersantine Sestamibi myocardial perfusion study with a normal left ventricular size and wall 
thickness, preserved left ventricular systolic function, LVEF by gated SPECT 80 %.
  D. Today, patient had been doing pretty well from cardiac standpoint.  There is no new spe
cific cardiac symptoms.    
   She is in a class II of [employer] Heart [employer] functional class.  There is trace bila
teral ankle pitting edema on physical examination..
 2.  Heart murmur
 3. Kidney stone, tipped bladder  
  A. Patient was recently seen by Dr. Cruz for a kidney stone who planned to perform opera
tion.  EKG was performed and revealed a right bundle branch block.
 4.  History of chronic obstructive pulmonary disease
  A. According to her son, patient has history of COPD documented.
 
 PLAN:  
 
 1.  Patient is now ready to pursue kidney stone and bladder surgery.  There is no need for 
any further cardiac workup.  She should have a low cardiovascular risk.
 2.  Discussed the utilization of checking alpha-1 anti-trypsin level if she has a documente
d COPD.  She will discuss this with her ECP.
 3.  Follow-up as needed.
 
 Electronically signed by: Paul Thomson MD [facility] 2016 
 
 Portions of this chart may have been created with Dragon voice recognition software. Occasi
onal wrong-word or   sound-alike  substitutions may have occurred due to the inherent saucedo
itations of voice recognition software. Please read the chart carefully and recognize, using
 context, where these substitutions have occurred.
 Electronically signed by Paul Thomson MD at 2016  4:27 PM PDTdocumented in this 
encounter
 
 Plan of Treatment
 Not on filedocumented as of this encounter
 
 Visit Diagnoses
 
 
+------------------------------------------------------------------------------+
| Diagnosis                                                                    |
+------------------------------------------------------------------------------+
|   Abnormal EKG - Primary  Nonspecific abnormal electrocardiogram (ECG) (EKG) |
+------------------------------------------------------------------------------+
|   Other chest pain                                                           |
+------------------------------------------------------------------------------+
 documented in this encounter

## 2020-08-02 NOTE — XMS
Encounter Summary
  Created on: 2020
 
 Flip Virginia Su
 External Reference #: [id number]
 : 42
 Sex: Female
 
 Demographics
 
 
+-----------------------+----------------------+
| Address               | [address]    |
|                       | [place]  [id number] |
+-----------------------+----------------------+
| Home Phone            | [phone]      |
+-----------------------+----------------------+
| Preferred Language    | Unknown              |
+-----------------------+----------------------+
| Marital Status        |              |
+-----------------------+----------------------+
| Muslim Affiliation | Unknown              |
+-----------------------+----------------------+
| Race                  | Unknown              |
+-----------------------+----------------------+
| Ethnic Group          | Unknown              |
+-----------------------+----------------------+
 
 
 Author
 
 
+--------------+--------------------------------------------+
| Author       | [facility]  |
|              | and [facility]                                |
+--------------+--------------------------------------------+
| Organization | [facility]  |
|              | and [facility]                                |
+--------------+--------------------------------------------+
| Address      | Unknown                                    |
+--------------+--------------------------------------------+
| Phone        | Unavailable                                |
+--------------+--------------------------------------------+
 
 
 
 Support
 
 
+---------------+--------------+-------------------+-----------------+
| Name          | Relationship | Address           | Phone           |
+---------------+--------------+-------------------+-----------------+
| Nawaf Carrera | E[place]         | [address]  | [phone] |
|               |              | [place]   |                 |
|               |              | [address]             |                 |
+---------------+--------------+-------------------+-----------------+
 
 
 
 
 Care Team Providers
 
 
+----------------------------+------+-----------------+
| Care Team Member Name      | Role | Phone           |
+----------------------------+------+-----------------+
| Carmine Gomez  | PCP  | [phone] |
| MD                         |      |                 |
+----------------------------+------+-----------------+
 
 
 
 Reason for Referral
 Diagnostic/Screening (Routine)
 
+--------+--------+-----------+--------------+--------------+--------------+
| Status | Reason | Specialty | Diagnoses /  | Referred By  | Referred To  |
|        |        |           | Procedures   | Contact      | Contact      |
+--------+--------+-----------+--------------+--------------+--------------+
| Closed |        | Radiology |   Diagnoses  |   [facility]    |              |
|        |        |           |  Calculus of | Joss ESPINOZA MD |              |
|        |        |           |  kidney      |   [address]    |              |
|        |        |           | Procedures   | [address]    |              |
|        |        |           | CT Abdomen   | [address]      |              |
|        |        |           | Pelvis wo    | [place], [place] |              |
|        |        |           | Contrast     |  [address]  |              |
|        |        |           |              |  Phone:      |              |
|        |        |           |              | [phone] |              |
|        |        |           |              |   Fax:       |              |
|        |        |           |              | [phone] |              |
+--------+--------+-----------+--------------+--------------+--------------+
 
 
 
 
 Reason for Visit
 Diagnostic/Screening (Routine)
 
+--------+--------+-----------+--------------+--------------+--------------+
| Status | Reason | Specialty | Diagnoses /  | Referred By  | Referred To  |
|        |        |           | Procedures   | Contact      | Contact      |
+--------+--------+-----------+--------------+--------------+--------------+
| Closed |        | Radiology |   Diagnoses  |   [place],    |              |
|        |        |           |  Calculus of | Joss ESPINOZA MD |              |
|        |        |           |  kidney      |   [address]    |              |
|        |        |           | Procedures   | [address]    |              |
|        |        |           | CT Abdomen   | [address]      |              |
|        |        |           | Pelvis wo    | [place], [place] |              |
|        |        |           | Contrast     |  [address]  |              |
|        |        |           |              |  Phone:      |              |
|        |        |           |              | [phone] |              |
|        |        |           |              |   Fax:       |              |
|        |        |           |              | [phone] |              |
+--------+--------+-----------+--------------+--------------+--------------+
 
 
 
 
 Encounter Details
 
 
 
+--------+-----------+----------------------+----------------------+--------------------+
| Date   | Type      | Department           | Care Team            | Description        |
+--------+-----------+----------------------+----------------------+--------------------+
| 10/11/ | Hospital  |   [facility] |   Joss Schwartz,  | Calculus of kidney |
| 2016   | Encounter |  [facility] [address]   | MD  [address]   |                    |
|        |           | [place], | [address]          |                    |
|        |           |  [place] [address]       | [place], [place]         |                    |
|        |           | [phone]         | [address]           |                    |
|        |           |                      | [phone]         |                    |
|        |           |                      | [phone] (Fax)   |                    |
+--------+-----------+----------------------+----------------------+--------------------+
 
 
 
 Social History
 
 
+---------------+-------+-----------+--------+------+
| Tobacco Use   | Types | Packs/Day | Years  | Date |
|               |       |           | Used   |      |
+---------------+-------+-----------+--------+------+
| Former Smoker |       |           |        |      |
+---------------+-------+-----------+--------+------+
 
 
 
+---------------------+---+---+---+
| Smokeless Tobacco:  |   |   |   |
| Never Used          |   |   |   |
+---------------------+---+---+---+
 
 
 
+------------------------+
| Comments: quit in  |
+------------------------+
 
 
 
+-------------+----------------------+---------+----------+
| Alcohol Use | Drinks/Week          | oz/Week | Comments |
+-------------+----------------------+---------+----------+
| No          |   0 Standard drinks  | 0.0     |          |
|             | or equivalent        |         |          |
+-------------+----------------------+---------+----------+
 
 
 
+------------------+---------------+
| Sex Assigned at  | Date Recorded |
| Birth            |               |
+------------------+---------------+
| Not on file      |               |
+------------------+---------------+
 documented as of this encounter
 
 Medications at Time of Discharge
 
 
 
+---------------------+----------------------+-----------+---------+--------+----------+
| Medication          | Sig                  | Dispensed | Refills | Start  | End Date |
|                     |                      |           |         | Date   |          |
+---------------------+----------------------+-----------+---------+--------+----------+
|   alendronate       | Take 70 mg by mouth  |           | 0       |        |          |
| (FOSAMAX) 70 mg     | Once a week.         |           |         |        |          |
| tablet              |                      |           |         |        |          |
+---------------------+----------------------+-----------+---------+--------+----------+
|   aspirin 81 MG     | Take 81 mg by mouth  |           | 0       |        |          |
| tablet              | Daily.               |           |         |        |          |
+---------------------+----------------------+-----------+---------+--------+----------+
|   Cholecalciferol   | Take 5,000 Units by  |           | 0       |        |          |
| (VITAMIN D-3) 2000  | mouth Daily.         |           |         |        |          |
| units CAPS          |                      |           |         |        |          |
+---------------------+----------------------+-----------+---------+--------+----------+
|   cyanocobalamin    | Take 5,000 mcg by    |           | 0       |        |          |
| (VITAMIN B-12) 500  | mouth Daily.         |           |         |        |          |
| mcg tablet          |                      |           |         |        |          |
+---------------------+----------------------+-----------+---------+--------+----------+
|   ranitidine        | Take 300 mg by mouth |           | 0       |        |          |
| (ZANTAC) 300 MG     |  nightly.            |           |         |        |          |
| capsule             |                      |           |         |        |          |
+---------------------+----------------------+-----------+---------+--------+----------+
 documented as of this encounter
 
 Plan of Treatment
 Not on filedocumented as of this encounter
 
 Procedures
 
 
+----------------------+--------+-------------+----------------------+----------------------
+
| Procedure Name       | Priori | Date/Time   | Associated Diagnosis | Comments             
|
|                      | ty     |             |                      |                      
|
+----------------------+--------+-------------+----------------------+----------------------
+
| CT ABDOMEN PELVIS WO | Routin | 10/11/2016  |   Calculus of kidney |   Results for this   
|
|  CONTRAST            | e      | 11:59 AM    |                      | procedure are in the 
|
|                      |        | PDT         |                      |  results section.    
|
+----------------------+--------+-------------+----------------------+----------------------
+
 documented in this encounter
 
 Results
 CT Abdomen Pelvis wo Contrast (10/11/2016 11:59 AM PDT)
 
+----------+
| Specimen |
+----------+
|          |
+----------+
 
 
 
 
+------------------------------------------------------------------------+---------------+
| Narrative                                                              | Performed At  |
+------------------------------------------------------------------------+---------------+
|   UNENHANCED CT ABDOMEN AND PELVIS 10/11/2016 11:48 AM     CLINICAL    |   [facility] |
| HISTORY: CALCULUS OF KIDNEY     COMPARISON:   None     TECHNIQUE:      |               |
| Axial unenhanced images are performed through the abdomen and pelvis.  |               |
|   Coronal and sagittal reformations are also performed.      FINDINGS: |               |
|    Very large, smoothly marginated high density stone virtually        |               |
| filling the right  renal pelvis. This has cross-sectional measurements |               |
|  1.6 x 2.7 x 1.3 cm with a CT  density of approximately 1000 units.    |               |
| Surprisingly, there is no associated right  hydronephrosis. Kidneys    |               |
| are symmetric in size with normal contours. Low-density  2 cm cyst in  |               |
| the mid cortex of the left kidney. No other focal renal  abnormality.  |               |
| No other stones. Urinary bladder is within normal limits. Prostatic    |               |
| urethra is prominent, without prostatic hypertrophy.     Emphysematous |               |
|  changes in the lung bases with no airspace consolidation or  nodule.  |               |
| No effusion.     Liver shows normal appearance for a noncontrast scan. |               |
|   Gallbladder, pancreas, spleen and adrenal glands are normal.         |               |
| Diffuse almost uniform aortic intimal calcification with no stenotic   |               |
| or  aneurysmal segments. No retroperitoneal adenopathy.     Stomach    |               |
| and small bowel are nondilated. Ileocecal junction is normal. No       |               |
| appendix is seen. Scattered diverticula in the sigmoid region without  |               |
| associated  inflammatory change. No other colonic abnormality.     No  |               |
| mesenteric inflammatory changes or adenopathy. No abdominal or pelvic  |               |
| free  fluid.     Mild facet degenerative changes of the lumbar spine.  |               |
| No other bony or body.     IMPRESSION -   1. Very large, but           |               |
| nonobstructing high density stone in the right renal pelvis  as        |               |
| described above. This stone should be readily visible on plain x-ray.  |               |
| No  other renal stones.     2. Sigmoid diverticulosis, without imaging |               |
|  features of diverticulitis.     3. Pulmonary emphysema.      Dictated |               |
|  and Signed by: Vitor Martinez MD    Electronically signed:           |               |
| 10/11/2016 4:35 PM                                                     |               |
+------------------------------------------------------------------------+---------------+
 
 
 
+------------------------------------------------------------------------------------------+
| Procedure Note                                                                           |
+------------------------------------------------------------------------------------------+
|   [facility], Rad Results In - 10/11/2016  4:38 PM PDT  UNENHANCED CT ABDOMEN AND PELVIS        |
| 10/11/2016 11:48 AM CLINICAL HISTORY: CALCULUS OF KIDNEY COMPARISON:  None TECHNIQUE:    |
| Axial unenhanced images are performed through the abdomen and pelvis. Coronal and        |
| sagittal reformations are also performed.  FINDINGS: Very large, smoothly marginated     |
| high density stone virtually filling the rightrenal pelvis. This has cross-sectional     |
| measurements 1.6 x 2.7 x 1.3 cm with a CTdensity of approximately 1000 units.            |
| Surprisingly, there is no associated righthydronephrosis. Kidneys are symmetric in size  |
| with normal contours. Low-density2 cm cyst in the mid cortex of the left kidney. No      |
| other focal renalabnormality. No other stones. Urinary bladder is within normal limits.  |
| Prostaticurethra is prominent, without prostatic hypertrophy.Emphysematous changes in    |
| the lung bases with no airspace consolidation ornodule. No effusion.Liver shows normal   |
| appearance for a noncontrast scan.Gallbladder, pancreas, spleen and adrenal glands are   |
| normal.Diffuse almost uniform aortic intimal calcification with no stenotic oraneurysmal |
|  segments. No retroperitoneal adenopathy.Stomach and small bowel are nondilated.         |
| Ileocecal junction is normal. Noappendix is seen. Scattered diverticula in the sigmoid   |
| region without associatedinflammatory change. No other colonic abnormality.No mesenteric |
|  inflammatory changes or adenopathy. No abdominal or pelvic freefluid.Mild facet         |
| degenerative changes of the lumbar spine. No other bony or body. IMPRESSION - 1. Very    |
| large, but nonobstructing high density stone in the right renal pelvisas described       |
| above. This stone should be readily visible on plain x-ray. Noother renal stones.2.      |
 
| Sigmoid diverticulosis, without imaging features of diverticulitis.3. Pulmonary          |
| emphysema. Dictated and Signed by: Vitor Martinez MD  Electronically signed: 10/11/2016  |
| 4:35 PM                                                                                  |
|                                                                                          |
|Diffuse almost uniform aortic intimal calcification with no stenotic or                   |
|aneurysmal segments. No retroperitoneal adenopathy.                                       |
|                                                                                          |
|Stomach and small bowel are nondilated. Ileocecal junction is normal. No                  |
|appendix is seen. Scattered diverticula in the sigmoid region without associated          |
|inflammatory change. No other colonic abnormality.                                        |
|                                                                                          |
|No mesenteric inflammatory changes or adenopathy. No abdominal or pelvic free             |
|fluid.                                                                                   |
|                                                                                          |
|Mild facet degenerative changes of the lumbar spine. No other bony or body.               |
|                                                                                          |
|IMPRESSION -                                                                              |
|1. Very large, but nonobstructing high density stone in the right renal pelvis            |
|as described above. This stone should be readily visible on plain x-ray. No               |
|other renal stones.                                                                       |
|                                                                                          |
|2. Sigmoid diverticulosis, without imaging features of diverticulitis.                    |
|                                                                                          |
|3. Pulmonary emphysema.                                                                   |
|                                                                                          |
|Dictated and Signed by: Vitor Martinez MD                                                 |
| Electronically signed: 10/11/2016 4:35 PM                                                |
+------------------------------------------------------------------------------------------+
 
 
 
+---------------+---------+--------------------+--------------+
| Performing    | Address | City/State/Zipcode | Phone Number |
| Organization  |         |                    |              |
+---------------+---------+--------------------+--------------+
|   [facility] |         |                    |              |
+---------------+---------+--------------------+--------------+
 documented in this encounter
 
 Visit Diagnoses
 
 
+----------------------+
| Diagnosis            |
+----------------------+
|   Calculus of kidney |
+----------------------+
 documented in this encounter

## 2020-08-02 NOTE — XMS
Encounter Summary
  Created on: 2020
 
 Flip Virginia Su
 External Reference #: [id number]
 : 42
 Sex: Female
 
 Demographics
 
 
+-----------------------+----------------------+
| Address               | [address]    |
|                       | [place]  [address] |
+-----------------------+----------------------+
| Home Phone            | [phone]      |
+-----------------------+----------------------+
| Preferred Language    | Unknown              |
+-----------------------+----------------------+
| Marital Status        |              |
+-----------------------+----------------------+
| Evangelical Affiliation | Unknown              |
+-----------------------+----------------------+
| Race                  | Unknown              |
+-----------------------+----------------------+
| Ethnic Group          | Unknown              |
+-----------------------+----------------------+
 
 
 Author
 
 
+--------------+--------------------------------------------+
| Author       | [facility]  |
|              | and [facility]                                |
+--------------+--------------------------------------------+
| Organization | [facility]  |
|              | and [facility]                                |
+--------------+--------------------------------------------+
| Address      | Unknown                                    |
+--------------+--------------------------------------------+
| Phone        | Unavailable                                |
+--------------+--------------------------------------------+
 
 
 
 Support
 
 
+---------------+--------------+-------------------+-----------------+
| Name          | Relationship | Address           | Phone           |
+---------------+--------------+-------------------+-----------------+
| Nawaf Carrera | [place]         | [address]  | [phone] |
|               |              | [place]   |                 |
|               |              | [address]             |                 |
+---------------+--------------+-------------------+-----------------+
 
 
 
 
 Care Team Providers
 
 
+----------------------------+------+-----------------+
| Care Team Member Name      | Role | Phone           |
+----------------------------+------+-----------------+
| Carmine Gomez PCP  | [phone] |
| MD                         |      |                 |
+----------------------------+------+-----------------+
 
 
 
 Encounter Details
 
 
+--------+----------+----------------------+----------------------+-------------+
| Date   | Type     | Department           | Care Team            | Description |
+--------+----------+----------------------+----------------------+-------------+
| / | Abstract |   [facility]          |   Paul Thomson, |             |
| 2016   |          | CARDIOLOGY  [address]    |  MD  [address] |             |
|        |          | [address]  [place], |  [place],   |             |
|        |          |  [place] [address]       | [place] [address]             |             |
|        |          | [phone]         | [phone]         |             |
|        |          |                      | [phone] (Fax)   |             |
+--------+----------+----------------------+----------------------+-------------+
 
 
 
 Social History
 
 
+----------------+-------+-----------+--------+------+
| Tobacco Use    | Types | Packs/Day | Years  | Date |
|                |       |           | Used   |      |
+----------------+-------+-----------+--------+------+
| Never Assessed |       |           |        |      |
+----------------+-------+-----------+--------+------+
 
 
 
+------------------+---------------+
| Sex Assigned at  | Date Recorded |
| Birth            |               |
+------------------+---------------+
| Not on file      |               |
+------------------+---------------+
 documented as of this encounter
 
 Plan of Treatment
 Not on filedocumented as of this encounter
 
 Procedures
 
 
+----------------------+--------+------------+----------------------+----------------------+
| Procedure Name       | Priori | Date/Time  | Associated Diagnosis | Comments             |
|                      | ty     |            |                      |                      |
+----------------------+--------+------------+----------------------+----------------------+
| EXTERNAL LAB: CONCEPCION    | Routin | 2015 |                      |   Results for this   |
 
|                      | e      |            |                      | procedure are in the |
|                      |        |            |                      |  results section.    |
+----------------------+--------+------------+----------------------+----------------------+
| EXTERNAL LAB:        | Routin | 2015 |                      |   Results for this   |
| GLUCOSE              | e      |            |                      | procedure are in the |
|                      |        |            |                      |  results section.    |
+----------------------+--------+------------+----------------------+----------------------+
| EXTERNAL LAB: ALT    | Routin | 2015 |                      |   Results for this   |
|                      | e      |            |                      | procedure are in the |
|                      |        |            |                      |  results section.    |
+----------------------+--------+------------+----------------------+----------------------+
| EXTERNAL LAB: AST    | Routin | 2015 |                      |   Results for this   |
|                      | e      |            |                      | procedure are in the |
|                      |        |            |                      |  results section.    |
+----------------------+--------+------------+----------------------+----------------------+
| EXTERNAL LAB:        | Routin | 2015 |                      |   Results for this   |
| ALKALINE PHOSPHATASE | e      |            |                      | procedure are in the |
|                      |        |            |                      |  results section.    |
+----------------------+--------+------------+----------------------+----------------------+
| EXTERNAL LAB:        | Routin | 2015 |                      |   Results for this   |
| BILIRUBIN, TOTAL     | e      |            |                      | procedure are in the |
|                      |        |            |                      |  results section.    |
+----------------------+--------+------------+----------------------+----------------------+
| EXTERNAL LAB:        | Routin | 2015 |                      |   Results for this   |
| ALBUMIN              | e      |            |                      | procedure are in the |
|                      |        |            |                      |  results section.    |
+----------------------+--------+------------+----------------------+----------------------+
| EXTERNAL LAB:        | Routin | 2015 |                      |   Results for this   |
| PROTEIN, TOTAL       | e      |            |                      | procedure are in the |
|                      |        |            |                      |  results section.    |
+----------------------+--------+------------+----------------------+----------------------+
| EXTERNAL LAB:        | Routin | 2015 |                      |   Results for this   |
| CALCIUM              | e      |            |                      | procedure are in the |
|                      |        |            |                      |  results section.    |
+----------------------+--------+------------+----------------------+----------------------+
| EXTERNAL LAB: CARBON | Routin | 2015 |                      |   Results for this   |
|  DIOXIDE             | e      |            |                      | procedure are in the |
|                      |        |            |                      |  results section.    |
+----------------------+--------+------------+----------------------+----------------------+
| EXTERNAL LAB:        | Routin | 2015 |                      |   Results for this   |
| CHLORIDE             | e      |            |                      | procedure are in the |
|                      |        |            |                      |  results section.    |
+----------------------+--------+------------+----------------------+----------------------+
| EXTERNAL LAB:        | Routin | 2015 |                      |   Results for this   |
| POTASSIUM            | e      |            |                      | procedure are in the |
|                      |        |            |                      |  results section.    |
+----------------------+--------+------------+----------------------+----------------------+
| EXTERNAL LAB: SODIUM | Routin | 2015 |                      |   Results for this   |
|                      | e      |            |                      | procedure are in the |
|                      |        |            |                      |  results section.    |
+----------------------+--------+------------+----------------------+----------------------+
| EXTERNAL LAB: CBC    | Routin | 2015 |                      |   Results for this   |
|                      | e      |            |                      | procedure are in the |
|                      |        |            |                      |  results section.    |
+----------------------+--------+------------+----------------------+----------------------+
| EXTERNAL LAB:        | Routin | 2015 |                      |   Results for this   |
| PROTIME INR          | e      |            |                      | procedure are in the |
|                      |        |            |                      |  results section.    |
+----------------------+--------+------------+----------------------+----------------------+
| EXTERNAL LAB: EGFR   | Routin | 2015 |                      |   Results for this   |
 
|                      | e      |            |                      | procedure are in the |
|                      |        |            |                      |  results section.    |
+----------------------+--------+------------+----------------------+----------------------+
| EXTERNAL LAB:        | Routin | 2015 |                      |   Results for this   |
| CREATININE           | e      |            |                      | procedure are in the |
|                      |        |            |                      |  results section.    |
+----------------------+--------+------------+----------------------+----------------------+
| CBC WITH             | Routin | 2015 |                      |   Results for this   |
| DIFFERENTIAL         | e      |            |                      | procedure are in the |
|                      |        |            |                      |  results section.    |
+----------------------+--------+------------+----------------------+----------------------+
| COMPREHENSIVE        | Routin | 2015 |                      |   Results for this   |
| METABOLIC PANEL      | e      |            |                      | procedure are in the |
|                      |        |            |                      |  results section.    |
+----------------------+--------+------------+----------------------+----------------------+
 documented in this encounter
 
 Results
 CBC with Differential (2015)
 
+-------------+-------+----------------+------------+--------------+
| Component   | Value | Ref Range      | Performed  | Pathologist  |
|             |       |                | At         | Signature    |
+-------------+-------+----------------+------------+--------------+
| MCH         | 28.0  | 26.0 - 33.0 pg |            |              |
+-------------+-------+----------------+------------+--------------+
| MCHC        | 34.0  | 30.0 - 36.0 %  |            |              |
+-------------+-------+----------------+------------+--------------+
| % Basophils | 0.0   | 1.0 %          |            |              |
+-------------+-------+----------------+------------+--------------+
 
 
 
+-----------------+
| Specimen        |
+-----------------+
| Blood specimen  |
| (specimen)      |
+-----------------+
 External Lab: CBC (2015)
 
+-------------+----------+-----------+------------+--------------+
| Component   | Value    | Ref Range | Performed  | Pathologist  |
|             |          |           | At         | Signature    |
+-------------+----------+-----------+------------+--------------+
| WBC,        | 13.6 (A) | 4.5 - 11  | EXTERNAL   |              |
| External    |          |           | LAB        |              |
+-------------+----------+-----------+------------+--------------+
| HGB,        | 15.3     | 12 - 16   | EXTERNAL   |              |
| External    |          |           | LAB        |              |
+-------------+----------+-----------+------------+--------------+
| HCT,        | 45.6 (A) | 35 - 45   | EXTERNAL   |              |
| External    |          |           | LAB        |              |
+-------------+----------+-----------+------------+--------------+
| PLT,        | 223      | 140 - 440 | EXTERNAL   |              |
| External    |          |           | LAB        |              |
+-------------+----------+-----------+------------+--------------+
| Neutrophils | 82.5 (A) | 39 - 80   | EXTERNAL   |              |
|   %,        |          |           | LAB        |              |
| External    |          |           |            |              |
 
+-------------+----------+-----------+------------+--------------+
| Lymphocytes | 12.6 (A) | 24 - 44   | EXTERNAL   |              |
|  %,         |          |           | LAB        |              |
| External    |          |           |            |              |
+-------------+----------+-----------+------------+--------------+
| Monocytes   | 4.7      | 0 - 12    | EXTERNAL   |              |
| %, External |          |           | LAB        |              |
+-------------+----------+-----------+------------+--------------+
| Eosinophils | 0.2      | 0 - 6     | EXTERNAL   |              |
|  %,         |          |           | LAB        |              |
| External    |          |           |            |              |
+-------------+----------+-----------+------------+--------------+
| RBC,        | 5.37 (A) | 3.8 - 5.1 | EXTERNAL   |              |
| External    |          |           | LAB        |              |
+-------------+----------+-----------+------------+--------------+
| MCV,        | 85       | 81 - 99   | EXTERNAL   |              |
| External    |          |           | LAB        |              |
+-------------+----------+-----------+------------+--------------+
| RDW,        | 12.7     | 10.5 - 15 | EXTERNAL   |              |
| External    |          |           | LAB        |              |
+-------------+----------+-----------+------------+--------------+
 
 
 
+--------------------------+
| Resulting Agency Comment |
+--------------------------+
|   [facility]          |
+--------------------------+
 
 
 
+----------------+---------+--------------------+--------------+
| Performing     | Address | City/State/Zipcode | Phone Number |
| Organization   |         |                    |              |
+----------------+---------+--------------------+--------------+
|   EXTERNAL LAB |         |                    |              |
+----------------+---------+--------------------+--------------+
 External Lab: Protime INR (2015)
 
+-----------+-------+-------------+------------+--------------+
| Component | Value | Ref Range   | Performed  | Pathologist  |
|           |       |             | At         | Signature    |
+-----------+-------+-------------+------------+--------------+
| INR,      | 0.9   | 0.8 - 3.5   | EXTERNAL   |              |
| External  |       |             | LAB        |              |
+-----------+-------+-------------+------------+--------------+
| PT,       | 12.9  | 12.4 - 15.8 | EXTERNAL   |              |
| External  |       |             | LAB        |              |
+-----------+-------+-------------+------------+--------------+
 
 
 
+-----------------+
| Specimen        |
+-----------------+
| Blood specimen  |
| (specimen)      |
+-----------------+
 
 
 
 
+--------------------------+
| Resulting Agency Comment |
+--------------------------+
|   [facility]          |
+--------------------------+
 
 
 
+----------------+---------+--------------------+--------------+
| Performing     | Address | City/State/Zipcode | Phone Number |
| Organization   |         |                    |              |
+----------------+---------+--------------------+--------------+
|   EXTERNAL LAB |         |                    |              |
+----------------+---------+--------------------+--------------+
 Comprehensive Metabolic Panel (2015)
 
+-------------+-------+---------------+-------------+--------------+
| Component   | Value | Ref Range     | Performed   | Pathologist  |
|             |       |               | At          | Signature    |
+-------------+-------+---------------+-------------+--------------+
| Anion Gap   | 13    | 7 - 21 mmol/L | [facility]  |              |
|             |       |               | [facility]    |              |
|             |       |               | [facility]     |              |
|             |       |               | [facility] -    |              |
|             |       |               | LABORATORY  |              |
+-------------+-------+---------------+-------------+--------------+
| BUN/Creatin | 12    | 6 - 28.6      | [facility]  |              |
| ine Ratio   |       |               | [facility]    |              |
|             |       |               | [facility]     |              |
|             |       |               | C[facility] -    |              |
|             |       |               | LABORATORY  |              |
+-------------+-------+---------------+-------------+--------------+
| Globulin    | 3     | 1.8 - 3.5     | [facility]  |              |
|             |       |               | [facility]    |              |
|             |       |               | [facility]     |              |
|             |       |               | C[facility] -    |              |
|             |       |               | LABORATORY  |              |
+-------------+-------+---------------+-------------+--------------+
| Albumin/Radha | 1.5   | 1.1 - 2.4     | [facility]  |              |
| bulin Ratio |       |               | [facility]    |              |
|             |       |               | [facility]     |              |
|             |       |               | CENTER -    |              |
|             |       |               | LABORATORY  |              |
+-------------+-------+---------------+-------------+--------------+
 
 
 
+-----------------+
| Specimen        |
+-----------------+
| Blood specimen  |
| (specimen)      |
+-----------------+
 
 
 
+----------------------+--------------------+--------------------+----------------+
| Performing           | Address            | City/State/Zipcode | Phone Number   |
 
| Organization         |                    |                    |                |
+----------------------+--------------------+--------------------+----------------+
|   [facility]     |   [address] |   [place]  |   [phone] |
| [facility]  |                    | [address], [place]         |                |
| - LABORATORY         |                    |                    |                |
+----------------------+--------------------+--------------------+----------------+
 External Lab: BUN (2015)
 
+-----------+-------+-----------+------------+--------------+
| Component | Value | Ref Range | Performed  | Pathologist  |
|           |       |           | At         | Signature    |
+-----------+-------+-----------+------------+--------------+
| BUN,      | 9     | 6 - 23    | EXTERNAL   |              |
| External  |       |           | LAB        |              |
+-----------+-------+-----------+------------+--------------+
 
 
 
+--------------------------+
| Resulting Agency Comment |
+--------------------------+
|   [facility]          |
+--------------------------+
 
 
 
+----------------+---------+--------------------+--------------+
| Performing     | Address | City/State/Zipcode | Phone Number |
| Organization   |         |                    |              |
+----------------+---------+--------------------+--------------+
|   EXTERNAL LAB |         |                    |              |
+----------------+---------+--------------------+--------------+
 External Lab: Glucose (2015)
 
+-----------+---------+-----------+------------+--------------+
| Component | Value   | Ref Range | Performed  | Pathologist  |
|           |         |           | At         | Signature    |
+-----------+---------+-----------+------------+--------------+
| Glucose,  | 111 (A) | 70 - 100  | EXTERNAL   |              |
| External  |         |           | LAB        |              |
+-----------+---------+-----------+------------+--------------+
 
 
 
+--------------------------+
| Resulting Agency Comment |
+--------------------------+
|   [facility]          |
+--------------------------+
 
 
 
+----------------+---------+--------------------+--------------+
| Performing     | Address | City/State/Zipcode | Phone Number |
| Organization   |         |                    |              |
+----------------+---------+--------------------+--------------+
|   EXTERNAL LAB |         |                    |              |
+----------------+---------+--------------------+--------------+
 External Lab: ALT (2015)
 
 
+-----------+-------+-----------+------------+--------------+
| Component | Value | Ref Range | Performed  | Pathologist  |
|           |       |           | At         | Signature    |
+-----------+-------+-----------+------------+--------------+
| ALT,      | 9     | 7 - 52    | EXTERNAL   |              |
| External  |       |           | LAB        |              |
+-----------+-------+-----------+------------+--------------+
 
 
 
+--------------------------+
| Resulting Agency Comment |
+--------------------------+
|   [facility]          |
+--------------------------+
 
 
 
+----------------+---------+--------------------+--------------+
| Performing     | Address | City/State/Zipcode | Phone Number |
| Organization   |         |                    |              |
+----------------+---------+--------------------+--------------+
|   EXTERNAL LAB |         |                    |              |
+----------------+---------+--------------------+--------------+
 External Lab: AST (2015)
 
+-----------+-------+-----------+------------+--------------+
| Component | Value | Ref Range | Performed  | Pathologist  |
|           |       |           | At         | Signature    |
+-----------+-------+-----------+------------+--------------+
| AST,      | 16    | 13 - 39   | EXTERNAL   |              |
| External  |       |           | LAB        |              |
+-----------+-------+-----------+------------+--------------+
 
 
 
+--------------------------+
| Resulting Agency Comment |
+--------------------------+
|   [facility]          |
+--------------------------+
 
 
 
+----------------+---------+--------------------+--------------+
| Performing     | Address | City/State/Zipcode | Phone Number |
| Organization   |         |                    |              |
+----------------+---------+--------------------+--------------+
|   EXTERNAL LAB |         |                    |              |
+----------------+---------+--------------------+--------------+
 External Lab: Alkaline Phosphatase (2015)
 
+-----------+-------+-----------+------------+--------------+
| Component | Value | Ref Range | Performed  | Pathologist  |
|           |       |           | At         | Signature    |
+-----------+-------+-----------+------------+--------------+
| ALP,      | 61    | 30 - 128  | EXTERNAL   |              |
| External  |       |           | LAB        |              |
+-----------+-------+-----------+------------+--------------+
 
 
 
 
+--------------------------+
| Resulting Agency Comment |
+--------------------------+
|   [facility]          |
+--------------------------+
 
 
 
+----------------+---------+--------------------+--------------+
| Performing     | Address | City/State/Zipcode | Phone Number |
| Organization   |         |                    |              |
+----------------+---------+--------------------+--------------+
|   EXTERNAL LAB |         |                    |              |
+----------------+---------+--------------------+--------------+
 External Lab: Bilirubin, Total (2015)
 
+-------------+-------+-----------+------------+--------------+
| Component   | Value | Ref Range | Performed  | Pathologist  |
|             |       |           | At         | Signature    |
+-------------+-------+-----------+------------+--------------+
| Bilirubin,  | 1     | 0 - 1.2   | EXTERNAL   |              |
| Total,      |       |           | LAB        |              |
| External    |       |           |            |              |
+-------------+-------+-----------+------------+--------------+
 
 
 
+--------------------------+
| Resulting Agency Comment |
+--------------------------+
|   [facility]          |
+--------------------------+
 
 
 
+----------------+---------+--------------------+--------------+
| Performing     | Address | City/State/Zipcode | Phone Number |
| Organization   |         |                    |              |
+----------------+---------+--------------------+--------------+
|   EXTERNAL LAB |         |                    |              |
+----------------+---------+--------------------+--------------+
 External Lab: Albumin (2015)
 
+-----------+-------+-----------+------------+--------------+
| Component | Value | Ref Range | Performed  | Pathologist  |
|           |       |           | At         | Signature    |
+-----------+-------+-----------+------------+--------------+
| Albumin,  | 4.6   | 3.5 - 5   | EXTERNAL   |              |
| External  |       |           | LAB        |              |
+-----------+-------+-----------+------------+--------------+
 
 
 
+--------------------------+
| Resulting Agency Comment |
+--------------------------+
|   [facility]          |
+--------------------------+
 
 
 
 
+----------------+---------+--------------------+--------------+
| Performing     | Address | City/State/Zipcode | Phone Number |
| Organization   |         |                    |              |
+----------------+---------+--------------------+--------------+
|   EXTERNAL LAB |         |                    |              |
+----------------+---------+--------------------+--------------+
 External Lab: Protein, Total (2015)
 
+-----------+-------+-----------+------------+--------------+
| Component | Value | Ref Range | Performed  | Pathologist  |
|           |       |           | At         | Signature    |
+-----------+-------+-----------+------------+--------------+
| Protein,  | 7.6   | 6 - 8     | EXTERNAL   |              |
| Total,    |       |           | LAB        |              |
| External  |       |           |            |              |
+-----------+-------+-----------+------------+--------------+
 
 
 
+--------------------------+
| Resulting Agency Comment |
+--------------------------+
|   [facility]          |
+--------------------------+
 
 
 
+----------------+---------+--------------------+--------------+
| Performing     | Address | City/State/Zipcode | Phone Number |
| Organization   |         |                    |              |
+----------------+---------+--------------------+--------------+
|   EXTERNAL LAB |         |                    |              |
+----------------+---------+--------------------+--------------+
 External Lab: Calcium (2015)
 
+-----------+-------+------------+------------+--------------+
| Component | Value | Ref Range  | Performed  | Pathologist  |
|           |       |            | At         | Signature    |
+-----------+-------+------------+------------+--------------+
| Calcium,  | 9.6   | 8.4 - 10.2 | EXTERNAL   |              |
| External  |       |            | LAB        |              |
+-----------+-------+------------+------------+--------------+
 
 
 
+--------------------------+
| Resulting Agency Comment |
+--------------------------+
|   [facility]          |
+--------------------------+
 
 
 
+----------------+---------+--------------------+--------------+
| Performing     | Address | City/State/Zipcode | Phone Number |
| Organization   |         |                    |              |
+----------------+---------+--------------------+--------------+
 
|   EXTERNAL LAB |         |                    |              |
+----------------+---------+--------------------+--------------+
 External Lab: Carbon Dioxide (2015)
 
+-----------+-------+-----------+------------+--------------+
| Component | Value | Ref Range | Performed  | Pathologist  |
|           |       |           | At         | Signature    |
+-----------+-------+-----------+------------+--------------+
| Carbon    | 25    | 19 - 31   | EXTERNAL   |              |
| Dioxide,  |       |           | LAB        |              |
| External  |       |           |            |              |
+-----------+-------+-----------+------------+--------------+
 
 
 
+--------------------------+
| Resulting Agency Comment |
+--------------------------+
|   [facility]          |
+--------------------------+
 
 
 
+----------------+---------+--------------------+--------------+
| Performing     | Address | City/State/Zipcode | Phone Number |
| Organization   |         |                    |              |
+----------------+---------+--------------------+--------------+
|   EXTERNAL LAB |         |                    |              |
+----------------+---------+--------------------+--------------+
 External Lab: Chloride (2015)
 
+------------+-------+-----------+------------+--------------+
| Component  | Value | Ref Range | Performed  | Pathologist  |
|            |       |           | At         | Signature    |
+------------+-------+-----------+------------+--------------+
| Chloride,  | 104   | 95 - 112  | EXTERNAL   |              |
| External   |       |           | LAB        |              |
+------------+-------+-----------+------------+--------------+
 
 
 
+--------------------------+
| Resulting Agency Comment |
+--------------------------+
|   [facility]          |
+--------------------------+
 
 
 
+----------------+---------+--------------------+--------------+
| Performing     | Address | City/State/Zipcode | Phone Number |
| Organization   |         |                    |              |
+----------------+---------+--------------------+--------------+
|   EXTERNAL LAB |         |                    |              |
+----------------+---------+--------------------+--------------+
 External Lab: Potassium (2015)
 
+-------------+-------+-----------+------------+--------------+
| Component   | Value | Ref Range | Performed  | Pathologist  |
|             |       |           | At         | Signature    |
 
+-------------+-------+-----------+------------+--------------+
| Potassium,  | 3.8   | 3.6 - 5.1 | EXTERNAL   |              |
| External    |       |           | LAB        |              |
+-------------+-------+-----------+------------+--------------+
 
 
 
+--------------------------+
| Resulting Agency Comment |
+--------------------------+
|   [facility]          |
+--------------------------+
 
 
 
+----------------+---------+--------------------+--------------+
| Performing     | Address | City/State/Zipcode | Phone Number |
| Organization   |         |                    |              |
+----------------+---------+--------------------+--------------+
|   EXTERNAL LAB |         |                    |              |
+----------------+---------+--------------------+--------------+
 External Lab: Sodium (2015)
 
+-----------+-------+-----------+------------+--------------+
| Component | Value | Ref Range | Performed  | Pathologist  |
|           |       |           | At         | Signature    |
+-----------+-------+-----------+------------+--------------+
| Sodium,   | 138   | 132 - 143 | EXTERNAL   |              |
| External  |       |           | LAB        |              |
+-----------+-------+-----------+------------+--------------+
 
 
 
+--------------------------+
| Resulting Agency Comment |
+--------------------------+
|   [facility]          |
+--------------------------+
 
 
 
+----------------+---------+--------------------+--------------+
| Performing     | Address | City/State/Zipcode | Phone Number |
| Organization   |         |                    |              |
+----------------+---------+--------------------+--------------+
|   EXTERNAL LAB |         |                    |              |
+----------------+---------+--------------------+--------------+
 External Lab: eGFR (2015)
 
+-----------+-------+-------------+------------+--------------+
| Component | Value | Ref Range   | Performed  | Pathologist  |
|           |       |             | At         | Signature    |
+-----------+-------+-------------+------------+--------------+
| eGFR,     | 76    | 60 - 99,999 | EXTERNAL   |              |
| External  |       |             | LAB        |              |
+-----------+-------+-------------+------------+--------------+
 
 
 
+-----------------+
 
| Specimen        |
+-----------------+
| Blood specimen  |
| (specimen)      |
+-----------------+
 
 
 
+--------------------------+
| Resulting Agency Comment |
+--------------------------+
|   [facility]          |
+--------------------------+
 
 
 
+----------------+---------+--------------------+--------------+
| Performing     | Address | City/State/Zipcode | Phone Number |
| Organization   |         |                    |              |
+----------------+---------+--------------------+--------------+
|   EXTERNAL LAB |         |                    |              |
+----------------+---------+--------------------+--------------+
 External Lab: Creatinine (2015)
 
+-------------+-------+------------+------------+--------------+
| Component   | Value | Ref Range  | Performed  | Pathologist  |
|             |       |            | At         | Signature    |
+-------------+-------+------------+------------+--------------+
| Creatinine, | 0.75  | 0.7 - 1.18 | EXTERNAL   |              |
|  External   |       |            | LAB        |              |
+-------------+-------+------------+------------+--------------+
 
 
 
+-----------------+
| Specimen        |
+-----------------+
| Blood specimen  |
| (specimen)      |
+-----------------+
 
 
 
+--------------------------+
| Resulting Agency Comment |
+--------------------------+
|   [facility]          |
+--------------------------+
 
 
 
+----------------+---------+--------------------+--------------+
| Performing     | Address | City/State/Zipcode | Phone Number |
| Organization   |         |                    |              |
+----------------+---------+--------------------+--------------+
|   EXTERNAL LAB |         |                    |              |
+----------------+---------+--------------------+--------------+
 documented in this encounter
 
 Visit Diagnoses
 
 Not on filedocumented in this encounter

## 2020-08-02 NOTE — XMS
Encounter Summary
  Created on: 2020
 
 John Virginia Su
 External Reference #: [id number]
 : 42
 Sex: Female
 
 Demographics
 
 
+-----------------------+----------------------+
| Address               | [address]    |
|                       | [place]  [address] |
+-----------------------+----------------------+
| Home Phone            | [phone]      |
+-----------------------+----------------------+
| Preferred Language    | Unknown              |
+-----------------------+----------------------+
| Marital Status        |              |
+-----------------------+----------------------+
| Christian Affiliation | Unknown              |
+-----------------------+----------------------+
| Race                  | Unknown              |
+-----------------------+----------------------+
| Ethnic Group          | Unknown              |
+-----------------------+----------------------+
 
 
 Author
 
 
+--------------+--------------------------------------------+
| Author       | [facility]  |
|              | and [facility]                                |
+--------------+--------------------------------------------+
| Organization | [facility]  |
|              | and [facility]                                |
+--------------+--------------------------------------------+
| Address      | Unknown                                    |
+--------------+--------------------------------------------+
| Phone        | Unavailable                                |
+--------------+--------------------------------------------+
 
 
 
 Support
 
 
+---------------+--------------+-------------------+-----------------+
| Name          | Relationship | Address           | Phone           |
+---------------+--------------+-------------------+-----------------+
| Nawaf Carrera | ECON         | [address]  | [phone] |
|               |              | [place], [place]   |                 |
|               |              | [address]             |                 |
+---------------+--------------+-------------------+-----------------+
 
 
 
 
 Care Team Providers
 
 
+----------------------------+------+-----------------+
| Care Team Member Name      | Role | Phone           |
+----------------------------+------+-----------------+
| Carmine Gomez  | PCP  | [phone] |
| MD                         |      |                 |
+----------------------------+------+-----------------+
 
 
 
 Reason for Referral
 Diagnostic/Screening (Routine)
 
+--------+--------+-----------+--------------+--------------+---------------+
| Status | Reason | Specialty | Diagnoses /  | Referred By  | Referred To   |
|        |        |           | Procedures   | Contact      | Contact       |
+--------+--------+-----------+--------------+--------------+---------------+
| Closed |        | Radiology |   Diagnoses  |   [place], |   [facility] |
|        |        |           |  Abnormal    |  MD Cynthia  |  Medicine     |
|        |        |           | electrocardi |  [address]    | [address]  |
|        |        |           | ogram (ECG)  | [address]   |  [place], |
|        |        |           | (EKG)        | [place], |  [place]           |
|        |        |           | Encounter    |  [place] [address]    | [id number]    |
|        |        |           | for other    | Phone:       | Phone:        |
|        |        |           | preprocedura | [phone] | [phone]  |
|        |        |           | l            |   Fax:       |  Fax:         |
|        |        |           | examination  | [phone] | [phone]  |
|        |        |           |  Procedures  |              |               |
|        |        |           |  NM Nuclear  |              |               |
|        |        |           | Stress Test  |              |               |
|        |        |           | (Vasodilator |              |               |
|        |        |           | )  CHG       |              |               |
|        |        |           | MYOCARDIAL   |              |               |
|        |        |           | SPECT        |              |               |
|        |        |           | MULTIPLE     |              |               |
|        |        |           | STUDIES  CO  |              |               |
|        |        |           | CV STRS TST  |              |               |
|        |        |           | XERS&/OR RX  |              |               |
|        |        |           | CONT ECG W/O |              |               |
|        |        |           |  I&R  CO     |              |               |
|        |        |           | CARDIAC      |              |               |
|        |        |           | STRESS       |              |               |
|        |        |           | TST,INTERP/R |              |               |
|        |        |           | EPT ONLY     |              |               |
+--------+--------+-----------+--------------+--------------+---------------+
 
 
 Diagnostic/Screening (Routine)
 
+--------+--------+-----------+--------------+--------------+---------------+
| Status | Reason | Specialty | Diagnoses /  | Referred By  | Referred To   |
|        |        |           | Procedures   | Contact      | Contact       |
+--------+--------+-----------+--------------+--------------+---------------+
| Closed |        | Radiology |   Diagnoses  |   [place], |   [facility]    |
|        |        |           |  Pre-op exam |  MD Cynthia  | [address]  |
|        |        |           |   Abnormal   |  [address]    |  [place], |
|        |        |           | EKG          | [address]   |  [place]           |
 
|        |        |           | Procedures   | [place], | [address]    |
|        |        |           | ECHO         |  [place] [address]    | Phone:        |
|        |        |           | Complete  [place] | Phone:       | [phone]  |
|        |        |           |  ECHO HEART  | [phone] |  Fax:         |
|        |        |           | XTHORACIC,CO |   Fax:       | [phone]  |
|        |        |           | MPLETE W     | [phone] |               |
|        |        |           | DOPPLER  CO  |              |               |
|        |        |           | ECHO HEART   |              |               |
|        |        |           | XTHORACIC,CO |              |               |
|        |        |           | MPLETE, W/O  |              |               |
|        |        |           | DOPPLER      |              |               |
+--------+--------+-----------+--------------+--------------+---------------+
 
 
 
 
 Reason for Visit
 
 
+----------------+-------------------------------------+
| Reason         | Comments                            |
+----------------+-------------------------------------+
| New Patient    | RBBB                                |
+----------------+-------------------------------------+
| Pre-op Exam    | Kidney stone and Bladder Suspension |
+----------------+-------------------------------------+
| Abnormal Tests | EKG                                 |
+----------------+-------------------------------------+
 Evaluate & Treat (Routine)
 
+--------+--------+------------+--------------+--------------+---------------+
| Status | Reason | Specialty  | Diagnoses /  | Referred By  | Referred To   |
|        |        |            | Procedures   | Contact      | Contact       |
+--------+--------+------------+--------------+--------------+---------------+
| Closed |        | Cardiology |   Diagnoses  |   [place],   |   [place],  |
|        |        |            |  Abnormal    | Carmine     | MD Cynthia    |
|        |        |            | EKG  RBBB    | MD Delio  | [address]      |
|        |        |            | Preop        |  [address]     | [address]    |
|        |        |            | cardiovascul | Kaylyn Hutchison  | [place],  |
|        |        |            | ar exam      |  [place]  | [place] [address]      |
|        |        |            | Procedures   | OR           | Phone:        |
|        |        |            | NP           | [address]   | [phone]  |
|        |        |            |              | Phone:       |  Fax:         |
|        |        |            |              | [phone] | [phone]  |
|        |        |            |              |   Fax:       |               |
|        |        |            |              | [phone] |               |
+--------+--------+------------+--------------+--------------+---------------+
 
 
 
 
 Encounter Details
 
 
+--------+---------+----------------------+----------------------+---------------------+
| Date   | Type    | Department           | Care Team            | Description         |
+--------+---------+----------------------+----------------------+---------------------+
| / | Office  |   [facility]          |   Cynthia Thomson, | RBBB (Primary Dx);  |
|    | Visit   | CARDIOLOGY  [address]    |  MD  [address] | Pre-op exam;        |
|        |         | [address]  [place], |  [address]  [place],   | Abnormal EKG        |
 
|        |         |  [place] [address]       | [place] [address]             |                     |
|        |         | [phone]         | [phone]         |                     |
|        |         |                      | [phone] (Fax)   |                     |
+--------+---------+----------------------+----------------------+---------------------+
 
 
 
 Social History
 
 
+---------------+-------+-----------+--------+------+
| Tobacco Use   | Types | Packs/Day | Years  | Date |
|               |       |           | Used   |      |
+---------------+-------+-----------+--------+------+
| Former Smoker |       |           |        |      |
+---------------+-------+-----------+--------+------+
 
 
 
+---------------------+---+---+---+
| Smokeless Tobacco:  |   |   |   |
| Never Used          |   |   |   |
+---------------------+---+---+---+
 
 
 
+------------------------+
| Comments: quit in  |
+------------------------+
 
 
 
+-------------+----------------------+---------+----------+
| Alcohol Use | Drinks/Week          | oz/Week | Comments |
+-------------+----------------------+---------+----------+
| No          |   0 Standard drinks  | 0.0     |          |
|             | or equivalent        |         |          |
+-------------+----------------------+---------+----------+
 
 
 
+------------------+---------------+
| Sex Assigned at  | Date Recorded |
| Birth            |               |
+------------------+---------------+
| Not on file      |               |
+------------------+---------------+
 documented as of this encounter
 
 Last Filed Vital Signs
 
 
+-------------------+----------------------+----------------------+----------+
| Vital Sign        | Reading              | Time Taken           | Comments |
+-------------------+----------------------+----------------------+----------+
| Blood Pressure    | 124/82               | 2016  2:06 PM  |          |
|                   |                      | PDT                  |          |
+-------------------+----------------------+----------------------+----------+
| Pulse             | 84                   | 2016  1:57 PM  | Regular  |
|                   |                      | PDT                  |          |
 
+-------------------+----------------------+----------------------+----------+
| Temperature       | -                    | -                    |          |
+-------------------+----------------------+----------------------+----------+
| Respiratory Rate  | 16                   | 2016  1:57 PM  |          |
|                   |                      | PDT                  |          |
+-------------------+----------------------+----------------------+----------+
| Oxygen Saturation | -                    | -                    |          |
+-------------------+----------------------+----------------------+----------+
| Inhaled Oxygen    | -                    | -                    |          |
| Concentration     |                      |                      |          |
+-------------------+----------------------+----------------------+----------+
| Weight            | 74.1 kg (163 lb 4.8  | 2016  1:57 PM  |          |
|                   | oz)                  | PDT                  |          |
+-------------------+----------------------+----------------------+----------+
| Height            | 160 cm (5' 3")       | 2016  1:57 PM  |          |
|                   |                      | PDT                  |          |
+-------------------+----------------------+----------------------+----------+
| Body Mass Index   | 28.93                | 2016  1:57 PM  |          |
|                   |                      | PDT                  |          |
+-------------------+----------------------+----------------------+----------+
 documented in this encounter
 
 Patient Instructions
 Patient Instructions Sharda Boucher RN - 2016  3:13 PM PDTEcho:  
  Date:____________________________________
 
  Check-In Time:____________________________
 
  Where to Check In:_________________________
 
 Persantine/Lexiscan Myoview
 
 Date: _______________________________________________ 
 
 Check-in Time: _______________________________________
 
 Where to Check In: _______________________________________
 
 Instructions
 
 1. Nothing to eat or drink anything 6 hours prior to Persantine/Lexiscan
 2.  DO NOT drink caffeine 12 hours prior to the test.
 3.  You can take all other medications the morning of the test with a small sip of water.
 4.  Please bring a list of your current medications with you.
 
 Resting Portion of test:
 
 Date: _________________________________________________
 
 Check-in Time: _________________________________________
 
 Where to Check In: _________________________________________
 
 Follow up appointment: 2-4 weeks
 
  Provider: Cynthia Thomson MD
 
  Date:___________________________________________________
 
  Check-In Time:___________________________________________
 
 Electronically signed by Sharda Boucher RN at 2016  3:14 PM PDT
 documented in this encounter
 
 Progress Notes
 Cynthia Thomson MD - 2016  2:05 PM PDTFormatting of this note might be different f
rom the original.
   
 
 PATIENT NAME:  Laura Castelan  
 : 1940:         AGE: 76 y.o.
 REFERRED BY: Carmine Gomez PRIMARY CARE:
 Carmine Gomez MD
  
 
 NEW PATIENT OFFICE VISIT
 
 Date of Service: 16
 
 HISTORY OF PRESENT ILLNESS:
 Laura Castelan is a 76 y.o. female with a history of osteoporosis, heart murmur, kidney
 stone, tipped bladder and abnormal EKG.  She is being seen today for preop clearance prior 
to bladder surgery.
 
 Patient was recently seen by Dr. Cruz for a kidney stone who planned to perform operation
.  EKG was performed and revealed a right bundle branch block.
 
 Today, patient complains of bilateral ankle swelling.  However, her physical activity is ve
ry limited.  She cannot walk far due to the imbalance.  She's been most of the time sitting,
 watching TV and reading books.  She was told that she had a heart murmur in the past.  Ther
e is no chest pain or chest discomfort both at rest and on exertion.  Patient denies breathl
essness.  There is no palpitation dizziness or lightheadedness.  Patient can sleep on one pi
llow at night without difficulty breathing.  
 
 CURRENT PROBLEMS
 Patient Active Problem List 
 Diagnosis 
   Bundle branch block, right 
   Cystitis, subacute 
   Essential hypertension, benign 
   GERD (gastroesophageal reflux disease) 
   Right nephrolithiasis 
   Osteoporosis, postmenopausal 
   Vitamin D deficiency 
 
 MEDICAL, SURGICAL, AND PERSONAL HISTORY
 Past Surgical History 
 Procedure Laterality Date 
   Colonoscopy  2008 
   hyperplastic polyps 
   Appendectomy   
   Hysterectomy, total abdominal   
   removal of both ovaries 
  
 
 Family History 
 Problem Relation Age of Onset 
   Cirrhosis Father  
   Diabetes Mother  
   Heart disease Mother  
   arteriosclerotic 
 
 
 Family Status 
 Relation Status Death Age 
   Father  58 
   Mother  62 
   Sister Alive  
   Brother Alive  
   Brother Alive  
   Sister   
   Brother   
   Brother   
 
 History 
 
 Social History 
   Marital Status:  
   Spouse Name: N/A 
   Number of Children: 3 
   Years of Education: N/A 
 
 Social History Main Topics 
   Smoking status: Former Smoker 
   Smokeless tobacco: Never Used 
    Comment: quit in  
   Alcohol Use: No 
   Drug Use: No 
   Sexual Activity: Not on file 
 
 Other Topics Concern 
   None 
 
 Social History Narrative 
  Exercise:Stationary bike 
  Caffeine: 1 cup to 1 pot of coffee daily 
  Living situation:alone 
 
 CURRENT MEDICATIONS
 Current Outpatient Prescriptions 
 Medication Sig Dispense Refill 
   alendronate (FOSAMAX) 70 mg tablet Take 70 mg by mouth Once a week.   
   aspirin 81 MG tablet Take 81 mg by mouth Daily.   
   Cholecalciferol (VITAMIN D-3) 2000 units CAPS Take 4,000 Units by mouth Daily.   
   cyanocobalamin (VITAMIN B-12) 500 mcg tablet Take 500 mcg by mouth Daily.   
   ranitidine (ZANTAC) 300 MG capsule Take 300 mg by mouth nightly.   
 
 No current facility-administered medications for this visit. 
  
 
 ALLERGIES
 Allergies 
 Allergen Reactions 
   Codeine Other (See Comments) 
   hallucinations 
   Doxycycline Diarrhea and Nausea And Vomiting 
 
   Penicillins Hives and Rash 
   Sulfa Antibiotics Hives and Rash 
 
 ROS
 Review of Systems 
 Constitutional: Positive for malaise/fatigue. Negative for fever, chills, weight loss and d
iaphoresis. 
 HENT: Negative for congestion, hearing loss, nosebleeds, sore throat and tinnitus.  
 Eyes: Negative for blurred vision and double vision. 
 Respiratory: Positive for shortness of breath. Negative for cough and wheezing.  
 Cardiovascular: Positive for leg swelling. Negative for chest pain, palpitations, orthopnea
, claudication and PND. 
 Gastrointestinal: Positive for heartburn and abdominal pain. Negative for nausea, vomiting,
 diarrhea, constipation, blood in stool and melena. 
 Genitourinary: Positive for dysuria and flank pain. Negative for urgency, frequency and hem
aturia. 
 Musculoskeletal: Negative for myalgias, back pain, joint pain, falls and neck pain. 
 Skin: Negative for itching and rash. 
 Neurological: Positive for tremors. Negative for dizziness, tingling, seizures, loss of con
sciousness, weakness and headaches. 
 Endo/Heme/Allergies: Negative for environmental allergies and polydipsia. Bruises/bleeds ea
sily. 
 Psychiatric/Behavioral: Negative for memory loss. The patient is not nervous/anxious.  
 
 OBJECTIVE:  
 
 PHYSICAL EXAM
 /82 mmHg | Pulse 84 | Resp 16 | Ht 1.6 m (5' 3") | Wt 74.072 kg (163 lb 4.8 oz) | BMI
 28.93 kg/m2
 Physical Exam 
 Constitutional: She appears well-developed and well-nourished. No distress. 
 Female individual without acute distress, arrived with a cane, accompanied by her son. 
 Neck: Normal carotid pulses, no hepatojugular reflux and no JVD present. Carotid bruit is n
ot present. 
 Cardiovascular: Normal rate, regular rhythm, S1 normal, S2 normal, intact distal pulses and
 normal pulses.  PMI is not displaced.  Exam reveals no gallop, no S3, no S4 and no friction
 rub.  
 Murmur (rade 1/6 holosystolic murmur along) heard.
 Pulses:
      Carotid pulses are 2+ on the right side, and 2+ on the left side.
      Dorsalis pedis pulses are 2+ on the right side, and 2+ on the left side. 
 Pulmonary/Chest: Effort normal and breath sounds normal. No accessory muscle usage. No resp
iratory distress. She has no wheezes. She has no rhonchi. She has no rales. 
 Abdominal: Normal appearance, normal aorta and bowel sounds are normal. She exhibits no abd
ominal bruit. There is no hepatosplenomegaly. There is no tenderness. 
 Musculoskeletal: She exhibits edema (bilateral 1+ ankle pitting edema.). 
 Neurological: She is alert. Gait normal. 
 Skin: Skin is warm and dry. 
 Psychiatric: She has a normal mood and affect. Her mood appears not anxious. She does not e
xhibit a depressed mood. 
 
 ECG:  Normal sinus rhythm, complete right bundle branch block, nonspecific ST-T abnormaliti
es.
 
 LAB RESULTS: 
 LIPID
 No results found for: CHOL, TRIG, HDL, LDL, CHOLHDL, LDLEX, HDLEX, TRIGEX, CHOLEX
 CHEMISTRY
 Lab Results 
 Component Value Date 
 
  GLUEX 111* 2015 
  NAEX 138 2015 
  KEX 3.8 2015 
  CLEX 104 2015 
  CO2EX 25 2015 
  ASTEX 16 2015 
  ALTEX 9 2015 
  EGFREX 76 2015 
  CREEX 0.75 2015 
 
 HEMATOLOGY
 Lab Results 
 Component Value Date 
  WBCEX 13.6* 2015 
  HGBEX 15.3 2015 
  HCTEX 45.6* 2015 
  PLTEX 223 2015 
 
 I reviewed records from Carmine Gomez M.D. for office visit on 16.  Referr
al to cardiologist for preop clearance
 
 ASSESSMENT:  
 
 1.  Right bundle-branch block and ankle swelling, preop clearance prior to bladder surgery
  A. Patient was found to have a complete right bundle branch block on the preop EKG.
  B. Today, patient complains of bilateral ankle swelling.  However, her physical activity i
s very limited.  She cannot walk far due to the imbalance.  She's been most of the time sitt
ing, watching TV and reading books.  She was told that she had a heart murmur in the past.  
There is no chest pain or chest discomfort both at rest and on exertion.  Patient denies brett
athlessness. 
   She is in a class II of New York Heart Association functional class.  There is 1+ bilater
al ankle pitting edema on physical examination.
  She is undergoing an elective, non-emergent surgery and would benefit from a cardiac risk 
assessment.  Her Revised Cardiac Risk Index (RCRI) is calculated showing his risks include  
high risk surgery, coronary artery disease, congestive heart failure, cerebrovascular diseas
e, diabetes on insulin and serum creatinine >2mg/dL, which is 1 risk equating to Class II, e
stimated 0.9% risk of MACE.  This is considered an elevated risk.  Her Duke Activity Status 
Index (DASI) score is calculated and shows she has a poor functional capacity, so we are emily
ble to evaluate METS, and would benefit from further testing and risk stratification.  She i
s not on a beta blocker, and is not on HMG  CoA reductase inhibitor (statin).  She [employer]
logic stress test..  Prior to proceeding with surgery, the risk and benefit of the procedure
 need to be discussed between patient and surgeon.
 2.  Heart murmur
 3. Kidney stone, tipped bladder  
  A. Patient was recently seen by Dr. Cruz for a kidney stone who planned to perform opera
tion.  EKG was performed and revealed a right bundle branch block.
 
 PLAN:  
 
 1.  Echocardiogram is warranted to assess for potential heart failure.
 2.  Persantine SPECT MPI is indicated to assess cardiac risk prior to noncardiac surgery.
 3.  I recommend a therapeutic lifestyle change including walking 30 minutes a day, choosing
 healthy choices of diet , including DASH diet and weight reduction. 
 4.  Follow-up in 2-4 weeks.
 
 Electronically signed by: Cynthia Thomson MD [facility] 2016 
 
 Portions of this chart may have been created with Dragon voice recognition software. Occasi
onal wrong-word or   sound-alike  substitutions may have occurred due to the inherent saucedo
itations of voice recognition software. Please read the chart carefully and recognize, using
 
 context, where these substitutions have occurred.
 Electronically signed by Cynthia Thomson MD at 2016  4:28 PM PDTdocumented in this 
encounter
 
 Plan of Treatment
 Not on filedocumented as of this encounter
 
 Procedures
 
 
+----------------+--------+-------------+----------------------+----------------------+
| Procedure Name | Priori | Date/Time   | Associated Diagnosis | Comments             |
|                | ty     |             |                      |                      |
+----------------+--------+-------------+----------------------+----------------------+
| ECG 12 LEAD    | Routin | 2016  |   RBBB  Pre-op exam  |   Results for this   |
|                | e      |  1:55 PM    |                      | procedure are in the |
|                |        | PDT         |                      |  results section.    |
+----------------+--------+-------------+----------------------+----------------------+
 documented in this encounter
 
 Results
 NM Nuclear Stress Test (Vasodilator) (2016  1:27 PM PDT)
 
+----------+
| Specimen |
+----------+
|          |
+----------+
 
 
 
+------------------------------------------------------------------------+-----------------+
| Impressions                                                            | Performed At    |
+------------------------------------------------------------------------+-----------------+
|      1.    Persantine EKG is negative.  2.   Normal   Persantine       |   [facility]    |
| Sestamibi myocardial perfusion study with a normal   left ventricular  | [facility]        |
| size and wall thickness.   Preserved left ventricular   systolic       | [facility]  |
| function.   LVEF by gated SPECT 80 %.              Signed by: Cynthia   | - IMAGING       |
| MD [facility]     2016, 13:27                                |                 |
+------------------------------------------------------------------------+-----------------+
 
 
 
+------------------------------------------------------------------------+-----------------+
| Narrative                                                              | Performed At    |
+------------------------------------------------------------------------+-----------------+
|                     NUCLEAR MEDICINE STRESS TEST REPORT                |   [facility]    |
| Patient Name: Laura Castelan   Study Date:   2016   Primary   | [facility]        |
| Care Provider: Carmine Gomez MD   MRN:   [id number]   :      | [facility]  |
|   1940 Age:   76 y.o. Gender: female      CLINICAL                 | - IMAGING       |
| HISTORY/DIAGNOSIS:   Chest pain        PERSANTINE SESTAMIBI STRESS     |                 |
| TEST  Indication:   chest pain      Procedure:   In the supine         |                 |
| position, 42.1 mg of   Persantine was infused intravenously   over 4   |                 |
| minutes.   Blood pressure and EKG were monitored every 1 minute.   5   |                 |
|  mL of normal saline was utilized to flush the IV line.   2.5 minutes  |                 |
| later,   10.4 mCi sestamibi intravenous injection.   SPECT myocardial  |                 |
| perfusion   imaging was acquired with wall motion analysis.   Rest     |                 |
| imaging was   performed using 34.1 mCi Sestamibi intravenous           |                 |
| injection.   Repeated SPECT   myocardial perfusion imaging was         |                 |
| acquired with wall motion analysis.   At   the end of the procedure,   |                 |
 
| 75 mg of aminophylline was infused   intravenously.     Hemodynamics:  |                 |
|     Heart rate baseline 80 beats per minute, peak 94 beats per minute. |                 |
|    Blood   pressure baseline 143/72 mmHg, peak 119/65 mmHg.   EKG      |                 |
| baseline underlying   sinus rhythm, complete right bundle branch       |                 |
| block.       Peak unchanged.     Side Effects:   None.     Arrhythmia: |                 |
|    None.     Persantine Sestamibi Myocardial Perfusion Imaging Result: |                 |
|           The Persantine Sestamibi tomographic images, reviewed        |                 |
| without the   attenuation compensation resolution, revealed a normal   |                 |
| myocardial   perfusion pattern as seen in short axis, vertical long    |                 |
| axis, and   horizontal long axis projections. The left ventricular     |                 |
| cavity is normal.   The rest imaging is also normal.                   |                 |
| Gated SPECT reveals a normal left ventricular wall thickness and       |                 |
| motion.     Preserved left ventricular systolic function. LVEF by      |                 |
| gated SPECT is 80 %.                                                   |                 |
+------------------------------------------------------------------------+-----------------+
 
 
 
+----------------------+---------------------+--------------------+----------------+
| Performing           | Address             | City/State/Zipcode | Phone Number   |
| Organization         |                     |                    |                |
+----------------------+---------------------+--------------------+----------------+
|   [facility]     |   [address] |   [place]  |   [phone] |
| [facility]  |                     | [id number]              |                |
| - IMAGING            |                     |                    |                |
+----------------------+---------------------+--------------------+----------------+
 ECHO Complete (2016  8:50 AM PDT)
 
+----------+
| Specimen |
+----------+
|          |
+----------+
 
 
 
+-----------------------------------------------------------------------------+-------------
----+
| Narrative                                                                   | Performed At
    |
+-----------------------------------------------------------------------------+-------------
----+
|   This result has an attachment that is not available.  Transthoracic       |   [facility]
    |
| Echocardiography Report (TTE)  Demographics  Patient Name    JOHN PORTILLO    
    |
| [place] Room Number                        A  Patient Number               | MEDICAL [facility]
ER  |
| [id number]         Date of Study             2016  Visit Number      | - IMAGING   
    |
|     [id number]  Accession         [id number]          Referring            |             
    |
| Physician    ALIX MARIE Number  Date of Birth   1940            |             
    |
|     Sonographer                SENA PATEL                          |             
    |
|                                                                [place]       |             
    |
| [place],                                                                 |             
    |
 
|                         [place]  Age                  76 year(s)                 |             
    |
| Interpreting               ALIX MARIE                                 |             
    |
|                       Cardiologist               CYNTHIA THOMSON MD       |             
    |
|  Gender             Female                Nurse Procedure Type of           |             
    |
| Study  TTE procedure: ECHO Complete. Procedure dateDate:                    |             
    |
| 2016Start: 08:09 AM Study Location: Echo LabIndications:              |             
    |
| Abnormal .31/R94.31 and preop Exam [id number]/[id number].Patient            |             
    |
| Status: RoutineHeight: 63 inchesWeight: 163 poundsBSA: 1.77 m^2BMI:         |             
    |
| 28.87 kg/m^2Rhythm: Normal Sinus RhythmHR: 78 bpm ConclusionsSummary1.      |             
    |
|  Normal left ventricular size, wall thickness and motion. Preserved         |             
    |
| leftventricular systolic function. LVEF is 65%.2. Grade 1 left              |             
    |
| ventricular diastolic dysfunction.3. Normal valvular structure.4.           |             
    |
| Normal right-sided pressure.5. Normal IVC with normal respiratory           |             
    |
| collapse.                                                                   |             
    |
| Signature-------------------------------------------------------------      |             
    |
| --------------- Electronically signed by CYNTHIA THOMSON,                  |             
    |
| MD(Interpreting physician) on 2016 07:47                              |             
    |
| AM--------------------------------------------------------------------      |             
    |
| -------- FindingsMitral ValveStructurally normal mitral valve without       |             
    |
| significant stenosis orregurgitation.Aortic ValveAortic valve is            |             
    |
| trileaflet without significant stenosis or regurgitation.Tricuspid          |             
    |
| ValveA vegetation is noted on the tricuspid valve. Suggestive of            |             
    |
| endocarditis.Pulmonic ValveStructurally normal pulmonic valve without       |             
    |
| significant stenosis orregurgitation.Left AtriumNormal left                 |             
    |
| atrium.Left VentricleLeft ventricle is normal in size and function.         |             
    |
| Ejection fraction isestimated at 65 %.Grade 1 left ventricular              |             
    |
| diastolic dysfunction.Right AtriumNormal right atrium.Right                 |             
    |
| VentricleNormal right ventricular structure and function.Pericardial        |             
    |
| EffusionNo evidence of pericardial effusion. MiscellaneousNormal            |             
    |
| aortic root.The IVC appears normal. Valves  Mitral Valve  Peak E-Wave:      |             
    |
 
|  0.6 m/s Peak A-Wave: 0.83 m/s  Tissue Doppler  Septal e' Velocity:         |             
    |
| 0.06 m/s Septal E/e' Ratio:9.56  Aortic Valve        Mean Gradient:         |             
    |
| 2.05 mmHg  LVOT  Peak Velocity: 1.07 m/s Structures  Left Atrium  LA        |             
    |
| A/P Dimension: 3.4 cm                            LA Area: 11.03 cm^2        |             
    |
| LA Vol/BSA Index: 11 mL/m^2                        LA Volume: 18.71 ml      |             
    |
|   Left Ventricle  Diastolic Dimension: 4.2 cm             Systolic          |             
    |
| Dimension: 2.77 cm Septum Diastolic: 1 cm PW Diastolic: 1 cm EF             |             
    |
| Calculated: 70%  Miscellaneous  Aorta  Aortic Root: 3.33 cm Ascending       |             
    |
| Aorta: 3.58 cm                                                              |             
    |
|----------------------------------------------------------------------------  |            
     |
| Electronically signed by NAT MARRUFOInterpreting physician) on    |             
    |
| 2016 07:47 AM                                                         |             
    |
|----------------------------------------------------------------------------  |            
     |
|                                                                             |             
    |
|Findings                                                                    |              
   |
|Mitral Valve                                                                 |             
    |
|Structurally normal mitral valve without significant stenosis or             |             
    |
|regurgitation.                                                              |              
   |
|Aortic Valve                                                                 |             
    |
|Aortic valve is trileaflet without significant stenosis or regurgitation.    |             
    |
|Tricuspid Valve                                                              |             
    |
|A vegetation is noted on the tricuspid valve. Suggestive of endocarditis.    |             
    |
|Pulmonic Valve                                                               |             
    |
|Structurally normal pulmonic valve without significant stenosis or           |             
    |
|regurgitation.                                                              |              
   |
|Left Atrium                                                                  |             
    |
|Normal left atrium.                                                          |             
    |
|Left Ventricle                                                               |             
    |
|Left ventricle is normal in size and function. Ejection fraction is          |             
    |
|estimated at 65 %.                                                           |             
    |
 
|Grade 1 left ventricular diastolic dysfunction.                              |             
    |
|Right Atrium                                                                 |             
    |
|Normal right atrium.                                                         |             
    |
|Right Ventricle                                                              |             
    |
|Normal right ventricular structure and function.                             |             
    |
|Pericardial Effusion                                                         |             
    |
|No evidence of pericardial effusion.                                         |             
    |
|                                                                             |             
    |
|Miscellaneous                                                               |              
   |
|Normal aortic root.                                                          |             
    |
|The IVC appears normal.                                                      |             
    |
|                                                                             |             
    |
|Valves                                                                      |              
   |
|                                                                             |             
    |
| Mitral Valve                                                                |             
    |
|                                                                             |             
    |
| Peak E-Wave: 0.6 m/s                                                        |             
    |
| Peak A-Wave: 0.83 m/s                                                       |             
    |
|                                                                             |             
    |
| Tissue Doppler                                                              |             
    |
|                                                                             |             
    |
| Septal e' Velocity: 0.06 m/s                                                |             
    |
| Septal E/e' Ratio:9.56                                                      |             
    |
|                                                                             |             
    |
| Aortic Valve                                                                |             
    |
|                                                                             |             
    |
|       Mean Gradient: 2.05 mmHg                                              |             
    |
|                                                                             |             
    |
| LVOT                                                                        |             
    |
|                                                                             |             
    |
 
| Peak Velocity: 1.07 m/s                                                     |             
    |
|                                                                             |             
    |
|Structures                                                                  |              
   |
|                                                                             |             
    |
| Left Atrium                                                                 |             
    |
|                                                                             |             
    |
| LA A/P Dimension: 3.4 cm                            LA Area: 11.03 cm^2     |             
    |
| LA Vol/BSA Index: 11 mL/m^2                        LA Volume: 18.71 ml      |             
    |
|                                                                             |             
    |
| Left Ventricle                                                              |             
    |
|                                                                             |             
    |
| Diastolic Dimension: 4.2 cm             Systolic Dimension: 2.77 cm         |             
    |
| Septum Diastolic: 1 cm                                                      |             
    |
| PW Diastolic: 1 cm                                                          |             
    |
| EF Calculated: 70%                                                          |             
    |
|                                                                             |             
    |
| Miscellaneous                                                               |             
    |
|                                                                             |             
    |
| Aorta                                                                       |             
    |
|                                                                             |             
    |
| Aortic Root: 3.33 cm                                                        |             
    |
| Ascending Aorta: 3.58 cm                                                    |             
    |
|                                                                             |             
    |
+-----------------------------------------------------------------------------+-------------
----+
 
 
 
+------------------------------------------------------------------------------------------+
| Procedure Note                                                                           |
+------------------------------------------------------------------------------------------+
|   Sergio Cuevas Results In - 2016  7:48 AM PDT  Transthoracic Echocardiography Report   |
| (TTE) Demographics Patient Name   JOHN RIGGS Room Number                [place] Patient  |
| Number [id number]      Date of Study         2016 Visit Number   [id number]      |
| Accession      [id number]       Referring Physician   ALIX MARIE Number Date of    |
| Birth  1940       Sonographer           SENA PATEL                        |
|                                 BHANUJOSH HAMM,                                       |
 
|                  US Age            76 year(s)       Interpreting          ALIX      |
| CYNTHIA                                 Cardiologist          CYNTHIA THOMSON MD Gender |
|          Female           NurseProcedureType of Study TTE procedure: ECHO                |
| Complete.Procedure dateDate: 2016Start: 08:09 AMStudy Location: Echo               |
| LabIndications: Abnormal .31/R94.31 and preop Exam [id number]/[id number].Patient Status: |
|  RoutineHeight: 63 inchesWeight: 163 poundsBSA: 1.77 m^2BMI: 28.87 kg/m^2Rhythm: Normal  |
| Sinus RhythmHR: 78 bpmConclusionsSummary1. Normal left ventricular size, wall thickness  |
| and motion. Preserved leftventricular systolic function. LVEF is 65%.2. Grade 1 left     |
| ventricular diastolic dysfunction.3. Normal valvular structure.4. Normal right-sided     |
| pressure.5. Normal IVC with normal respiratory                                           |
| collapse.Signature---------------------------------------------------------------------- |
| ------ Electronically signed by CYNTHIA THOMSON MD(Interpreting physician) on          |
| 2016 07:47                                                                         |
| AM----------------------------------------------------------------------------FindingsMi |
| tral ValveStructurally normal mitral valve without significant stenosis                  |
| orregurgitation.Aortic ValveAortic valve is trileaflet without significant stenosis or   |
| regurgitation.Tricuspid ValveA vegetation is noted on the tricuspid valve. Suggestive of |
|  endocarditis.Pulmonic ValveStructurally normal pulmonic valve without significant       |
| stenosis orregurgitation.Left AtriumNormal left atrium.Left VentricleLeft ventricle is   |
| normal in size and function. Ejection fraction isestimated at 65 %.Grade 1 left          |
| ventricular diastolic dysfunction.Right AtriumNormal right atrium.Right VentricleNormal  |
| right ventricular structure and function.Pericardial EffusionNo evidence of pericardial  |
| effusion.MiscellaneousNormal aortic root.The IVC appears normal.Valves Mitral Valve Peak |
|  E-Wave: 0.6 m/s Peak A-Wave: 0.83 m/s Tissue Doppler Septal e' Velocity: 0.06 m/s       |
| Septal E/e' Ratio:9.56 Aortic Valve     Mean Gradient: 2.05 mmHg LVOT Peak Velocity:     |
| 1.07 m/sStructures Left Atrium LA A/P Dimension: 3.4 cm                   LA Area: 11.03 |
|  cm^2 LA Vol/BSA Index: 11 mL/m^2                LA Volume: 18.71 ml Left Ventricle      |
| Diastolic Dimension: 4.2 cm         Systolic Dimension: 2.77 cm Septum Diastolic: 1 cm   |
| PW Diastolic: 1 cm EF Calculated: 70% Miscellaneous Aorta Aortic Root: 3.33 cm Ascending |
|  Aorta: 3.58 cm                                                                          |
|Rhythm: Normal Sinus RhythmHR: 78 bpm                                                     |
|                                                                                          |
|Conclusions                                                                              |
|Summary                                                                                  |
|1. Normal left ventricular size, wall thickness and motion. Preserved left                |
|ventricular systolic function. LVEF is 65%.                                               |
|2. Grade 1 left ventricular diastolic dysfunction.                                        |
|3. Normal valvular structure.                                                             |
|4. Normal right-sided pressure.                                                           |
|5. Normal IVC with normal respiratory collapse.                                           |
|                                                                                          |
|Signature                                                                                |
|----------------------------------------------------------------------------               
|
| Electronically signed by CYNTHIA THOMSON MD(Interpreting physician) on                 |
| 2016 07:47 AM                                                                      |
|----------------------------------------------------------------------------               
|
|                                                                                          |
|Findings                                                                                 |
|Mitral Valve                                                                              |
|Structurally normal mitral valve without significant stenosis or                          |
|regurgitation.                                                                           |
|Aortic Valve                                                                              |
|Aortic valve is trileaflet without significant stenosis or regurgitation.                 |
|Tricuspid Valve                                                                           |
|A vegetation is noted on the tricuspid valve. Suggestive of endocarditis.                 |
|Pulmonic Valve                                                                            |
|Structurally normal pulmonic valve without significant stenosis or                        |
|regurgitation.                                                                           |
 
|Left Atrium                                                                               |
|Normal left atrium.                                                                       |
|Left Ventricle                                                                            |
|Left ventricle is normal in size and function. Ejection fraction is                       |
|estimated at 65 %.                                                                        |
|Grade 1 left ventricular diastolic dysfunction.                                           |
|Right Atrium                                                                              |
|Normal right atrium.                                                                      |
|Right Ventricle                                                                           |
|Normal right ventricular structure and function.                                          |
|Pericardial Effusion                                                                      |
|No evidence of pericardial effusion.                                                      |
|                                                                                          |
|Miscellaneous                                                                            |
|Normal aortic root.                                                                       |
|The IVC appears normal.                                                                   |
|                                                                                          |
|Valves                                                                                   |
|                                                                                          |
| Mitral Valve                                                                             |
|                                                                                          |
| Peak E-Wave: 0.6 m/s                                                                     |
| Peak A-Wave: 0.83 m/s                                                                    |
|                                                                                          |
| Tissue Doppler                                                                           |
|                                                                                          |
| Septal e' Velocity: 0.06 m/s                                                             |
| Septal E/e' Ratio:9.56                                                                   |
|                                                                                          |
| Aortic Valve                                                                             |
|                                                                                          |
|     Mean Gradient: 2.05 mmHg                                                             |
|                                                                                          |
| LVOT                                                                                     |
|                                                                                          |
| Peak Velocity: 1.07 m/s                                                                  |
|                                                                                          |
|Structures                                                                               |
|                                                                                          |
| Left Atrium                                                                              |
|                                                                                          |
| LA A/P Dimension: 3.4 cm                   LA Area: 11.03 cm^2                           |
| LA Vol/BSA Index: 11 mL/m^2                LA Volume: 18.71 ml                           |
|                                                                                          |
| Left Ventricle                                                                           |
|                                                                                          |
| Diastolic Dimension: 4.2 cm         Systolic Dimension: 2.77 cm                          |
| Septum Diastolic: 1 cm                                                                   |
| PW Diastolic: 1 cm                                                                       |
| EF Calculated: 70%                                                                       |
|                                                                                          |
| Miscellaneous                                                                            |
|                                                                                          |
| Aorta                                                                                    |
|                                                                                          |
| Aortic Root: 3.33 cm                                                                     |
| Ascending Aorta: 3.58 cm                                                                 |
+------------------------------------------------------------------------------------------+
 
 
 
 
+----------------------+---------------------+--------------------+----------------+
| Performing           | Address             | City/State/Zipcode | Phone Number   |
| Organization         |                     |                    |                |
+----------------------+---------------------+--------------------+----------------+
|   [facility]     |   [address] |   [place], [place]  |   [phone] |
| [facility]  |                     | [id number]              |                |
| - IMAGING            |                     |                    |                |
+----------------------+---------------------+--------------------+----------------+
 ECG 12 lead (2016  1:55 PM PDT)
 
+-------------+--------------------------+-----------+------------+--------------+
| Component   | Value                    | Ref Range | Performed  | Pathologist  |
|             |                          |           | At         | Signature    |
+-------------+--------------------------+-----------+------------+--------------+
| VENTRICULAR | 84                       | BPM       | [facility]  |              |
|  RATE EKG   |                          |           |            |              |
+-------------+--------------------------+-----------+------------+--------------+
| ATRIAL RATE | 84                       | BPM       | [facility]  |              |
+-------------+--------------------------+-----------+------------+--------------+
| P-R         | 134                      | ms        | [facility]  |              |
| INTERVAL    |                          |           |            |              |
+-------------+--------------------------+-----------+------------+--------------+
| QRS         | 130                      | ms        | [facility]  |              |
| DURATION    |                          |           |            |              |
+-------------+--------------------------+-----------+------------+--------------+
| Q-T         | 392                      | ms        | [facility]  |              |
| INTERVAL    |                          |           |            |              |
+-------------+--------------------------+-----------+------------+--------------+
| Q-T         | 463                      | ms        | [facility]  |              |
| INTERVAL    |                          |           |            |              |
| (CORRECTED) |                          |           |            |              |
+-------------+--------------------------+-----------+------------+--------------+
| P WAVE AXIS | 68                       | degrees   | [facility]  |              |
+-------------+--------------------------+-----------+------------+--------------+
| QRS AXIS    | 10                       | degrees   | [facility]  |              |
+-------------+--------------------------+-----------+------------+--------------+
| T AXIS      | 59                       | degrees   | [facility]  |              |
+-------------+--------------------------+-----------+------------+--------------+
| INTERPRETAT | Normal sinus rhythmRight |           | [facility]  |              |
| ION TEXT    |  bundle branch           |           |            |              |
|             | blockAbnormal ECGNo      |           |            |              |
|             | previous ECGs            |           |            |              |
|             | availableConfirmed by    |           |            |              |
|             | CYNTHIA THOMSON MD     |           |            |              |
|             | ([id number]) on 2016      |           |            |              |
|             | 10:00:40 AM              |           |            |              |
+-------------+--------------------------+-----------+------------+--------------+
 
 
 
+----------+
| Specimen |
+----------+
|          |
+----------+
 
 
 
+----------------------------------------------------------+--------------+
 
| Narrative                                                | Performed At |
+----------------------------------------------------------+--------------+
|   This result has an attachment that is not available.   |              |
+----------------------------------------------------------+--------------+
 
 
 
+--------------+---------+--------------------+--------------+
| Performing   | Address | City/State/Zipcode | Phone Number |
| Organization |         |                    |              |
+--------------+---------+--------------------+--------------+
|   [facility]  |         |                    |              |
+--------------+---------+--------------------+--------------+
 documented in this encounter
 
 Visit Diagnoses
 
 
+--------------------------------------------------------------------+
| Diagnosis                                                          |
+--------------------------------------------------------------------+
|   RBBB - Primary  Right bundle branch block                        |
+--------------------------------------------------------------------+
|   Pre-op exam  Preoperative examination, unspecified               |
+--------------------------------------------------------------------+
|   Abnormal EKG  Nonspecific abnormal electrocardiogram (ECG) (EKG) |
+--------------------------------------------------------------------+
 documented in this encounter

## 2020-08-02 NOTE — XMS
Encounter Summary
  Created on: 2020
 
 Flip Virginia Su
 External Reference #: [id number]
 : 42
 Sex: Female
 
 Demographics
 
 
+-----------------------+----------------------+
| Address               | [address]    |
|                       | [place]  [address] |
+-----------------------+----------------------+
| Home Phone            | [phone]      |
+-----------------------+----------------------+
| Preferred Language    | Unknown              |
+-----------------------+----------------------+
| Marital Status        |              |
+-----------------------+----------------------+
| Congregation Affiliation | Unknown              |
+-----------------------+----------------------+
| Race                  | Unknown              |
+-----------------------+----------------------+
| Ethnic Group          | Unknown              |
+-----------------------+----------------------+
 
 
 Author
 
 
+--------------+--------------------------------------------+
| Author       | [facility]  |
|              | and [facility]                                |
+--------------+--------------------------------------------+
| Organization | [facility]  |
|              | and [facility]                                |
+--------------+--------------------------------------------+
| Address      | Unknown                                    |
+--------------+--------------------------------------------+
| Phone        | Unavailable                                |
+--------------+--------------------------------------------+
 
 
 
 Support
 
 
+---------------+--------------+-------------------+-----------------+
| Name          | Relationship | Address           | Phone           |
+---------------+--------------+-------------------+-----------------+
| Nawaf Carrera | [employer]         | [address]  | [phone] |
|               |              | [place]   |                 |
|               |              | [id number]             |                 |
+---------------+--------------+-------------------+-----------------+
 
 
 
 
 Care Team Providers
 
 
+----------------------------+------+-----------------+
| Care Team Member Name      | Role | Phone           |
+----------------------------+------+-----------------+
| Carmine Gomez PCP  | [phone] |
| MD                         |      |                 |
+----------------------------+------+-----------------+
 
 
 
 Encounter Details
 
 
+--------+----------+----------------------+----------------------+-------------+
| Date   | Type     | Department           | Care Team            | Description |
+--------+----------+----------------------+----------------------+-------------+
| / | Abstract |   [facility]          |   Paul Thomson, |             |
| 2016   |          | CARDIOLOGY  [address]    |  MD  [address] |             |
|        |          | [place]  [place], |  [place],   |             |
|        |          |  [place] [address]       | [place] [address]             |             |
|        |          | [phone]         | [phone]         |             |
|        |          |                      | [phone] (Fax)   |             |
+--------+----------+----------------------+----------------------+-------------+
 
 
 
 Social History
 
 
+----------------+-------+-----------+--------+------+
| Tobacco Use    | Types | Packs/Day | Years  | Date |
|                |       |           | Used   |      |
+----------------+-------+-----------+--------+------+
| Never Assessed |       |           |        |      |
+----------------+-------+-----------+--------+------+
 
 
 
+------------------+---------------+
| Sex Assigned at  | Date Recorded |
| Birth            |               |
+------------------+---------------+
| Not on file      |               |
+------------------+---------------+
 documented as of this encounter
 
 Plan of Treatment
 Not on filedocumented as of this encounter
 
 Procedures
 
 
+----------------------+--------+------------+----------------------+----------------------+
| Procedure Name       | Priori | Date/Time  | Associated Diagnosis | Comments             |
|                      | ty     |            |                      |                      |
+----------------------+--------+------------+----------------------+----------------------+
| EXTERNAL LAB: CONCEPCION    | Routin | 2015 |                      |   Results for this   |
 
|                      | e      |            |                      | procedure are in the |
|                      |        |            |                      |  results section.    |
+----------------------+--------+------------+----------------------+----------------------+
| EXTERNAL LAB:        | Routin | 2015 |                      |   Results for this   |
| GLUCOSE              | e      |            |                      | procedure are in the |
|                      |        |            |                      |  results section.    |
+----------------------+--------+------------+----------------------+----------------------+
| EXTERNAL LAB: ALT    | Routin | 2015 |                      |   Results for this   |
|                      | e      |            |                      | procedure are in the |
|                      |        |            |                      |  results section.    |
+----------------------+--------+------------+----------------------+----------------------+
| EXTERNAL LAB: AST    | Routin | 2015 |                      |   Results for this   |
|                      | e      |            |                      | procedure are in the |
|                      |        |            |                      |  results section.    |
+----------------------+--------+------------+----------------------+----------------------+
| EXTERNAL LAB:        | Routin | 2015 |                      |   Results for this   |
| ALKALINE PHOSPHATASE | e      |            |                      | procedure are in the |
|                      |        |            |                      |  results section.    |
+----------------------+--------+------------+----------------------+----------------------+
| EXTERNAL LAB:        | Routin | 2015 |                      |   Results for this   |
| BILIRUBIN, TOTAL     | e      |            |                      | procedure are in the |
|                      |        |            |                      |  results section.    |
+----------------------+--------+------------+----------------------+----------------------+
| EXTERNAL LAB:        | Routin | 2015 |                      |   Results for this   |
| ALBUMIN              | e      |            |                      | procedure are in the |
|                      |        |            |                      |  results section.    |
+----------------------+--------+------------+----------------------+----------------------+
| EXTERNAL LAB:        | Routin | 2015 |                      |   Results for this   |
| PROTEIN, TOTAL       | e      |            |                      | procedure are in the |
|                      |        |            |                      |  results section.    |
+----------------------+--------+------------+----------------------+----------------------+
| EXTERNAL LAB:        | Routin | 2015 |                      |   Results for this   |
| CALCIUM              | e      |            |                      | procedure are in the |
|                      |        |            |                      |  results section.    |
+----------------------+--------+------------+----------------------+----------------------+
| EXTERNAL LAB: CARBON | Routin | 2015 |                      |   Results for this   |
|  DIOXIDE             | e      |            |                      | procedure are in the |
|                      |        |            |                      |  results section.    |
+----------------------+--------+------------+----------------------+----------------------+
| EXTERNAL LAB:        | Routin | 2015 |                      |   Results for this   |
| CHLORIDE             | e      |            |                      | procedure are in the |
|                      |        |            |                      |  results section.    |
+----------------------+--------+------------+----------------------+----------------------+
| EXTERNAL LAB:        | Routin | 2015 |                      |   Results for this   |
| POTASSIUM            | e      |            |                      | procedure are in the |
|                      |        |            |                      |  results section.    |
+----------------------+--------+------------+----------------------+----------------------+
| EXTERNAL LAB: SODIUM | Routin | 2015 |                      |   Results for this   |
|                      | e      |            |                      | procedure are in the |
|                      |        |            |                      |  results section.    |
+----------------------+--------+------------+----------------------+----------------------+
| EXTERNAL LAB: CBC    | Routin | 2015 |                      |   Results for this   |
|                      | e      |            |                      | procedure are in the |
|                      |        |            |                      |  results section.    |
+----------------------+--------+------------+----------------------+----------------------+
| EXTERNAL LAB:        | Routin | 2015 |                      |   Results for this   |
| PROTIME INR          | e      |            |                      | procedure are in the |
|                      |        |            |                      |  results section.    |
+----------------------+--------+------------+----------------------+----------------------+
| EXTERNAL LAB: EGFR   | Routin | 2015 |                      |   Results for this   |
 
|                      | e      |            |                      | procedure are in the |
|                      |        |            |                      |  results section.    |
+----------------------+--------+------------+----------------------+----------------------+
| EXTERNAL LAB:        | Routin | 2015 |                      |   Results for this   |
| CREATININE           | e      |            |                      | procedure are in the |
|                      |        |            |                      |  results section.    |
+----------------------+--------+------------+----------------------+----------------------+
| CBC WITH             | Routin | 2015 |                      |   Results for this   |
| DIFFERENTIAL         | e      |            |                      | procedure are in the |
|                      |        |            |                      |  results section.    |
+----------------------+--------+------------+----------------------+----------------------+
| COMPREHENSIVE        | Routin | 2015 |                      |   Results for this   |
| METABOLIC PANEL      | e      |            |                      | procedure are in the |
|                      |        |            |                      |  results section.    |
+----------------------+--------+------------+----------------------+----------------------+
 documented in this encounter
 
 Results
 CBC with Differential (2015)
 
+-------------+-------+----------------+------------+--------------+
| Component   | Value | Ref Range      | Performed  | Pathologist  |
|             |       |                | At         | Signature    |
+-------------+-------+----------------+------------+--------------+
| MCH         | 28.0  | 26.0 - 33.0 pg |            |              |
+-------------+-------+----------------+------------+--------------+
| MCHC        | 34.0  | 30.0 - 36.0 %  |            |              |
+-------------+-------+----------------+------------+--------------+
| % Basophils | 0.0   | 1.0 %          |            |              |
+-------------+-------+----------------+------------+--------------+
 
 
 
+-----------------+
| Specimen        |
+-----------------+
| Blood specimen  |
| (specimen)      |
+-----------------+
 External Lab: CBC (2015)
 
+-------------+----------+-----------+------------+--------------+
| Component   | Value    | Ref Range | Performed  | Pathologist  |
|             |          |           | At         | Signature    |
+-------------+----------+-----------+------------+--------------+
| WBC,        | 13.6 (A) | 4.5 - 11  | EXTERNAL   |              |
| External    |          |           | LAB        |              |
+-------------+----------+-----------+------------+--------------+
| HGB,        | 15.3     | 12 - 16   | EXTERNAL   |              |
| External    |          |           | LAB        |              |
+-------------+----------+-----------+------------+--------------+
| HCT,        | 45.6 (A) | 35 - 45   | EXTERNAL   |              |
| External    |          |           | LAB        |              |
+-------------+----------+-----------+------------+--------------+
| PLT,        | 223      | 140 - 440 | EXTERNAL   |              |
| External    |          |           | LAB        |              |
+-------------+----------+-----------+------------+--------------+
| Neutrophils | 82.5 (A) | 39 - 80   | EXTERNAL   |              |
|   %,        |          |           | LAB        |              |
| External    |          |           |            |              |
 
+-------------+----------+-----------+------------+--------------+
| Lymphocytes | 12.6 (A) | 24 - 44   | EXTERNAL   |              |
|  %,         |          |           | LAB        |              |
| External    |          |           |            |              |
+-------------+----------+-----------+------------+--------------+
| Monocytes   | 4.7      | 0 - 12    | EXTERNAL   |              |
| %, External |          |           | LAB        |              |
+-------------+----------+-----------+------------+--------------+
| Eosinophils | 0.2      | 0 - 6     | EXTERNAL   |              |
|  %,         |          |           | LAB        |              |
| External    |          |           |            |              |
+-------------+----------+-----------+------------+--------------+
| RBC,        | 5.37 (A) | 3.8 - 5.1 | EXTERNAL   |              |
| External    |          |           | LAB        |              |
+-------------+----------+-----------+------------+--------------+
| MCV,        | 85       | 81 - 99   | EXTERNAL   |              |
| External    |          |           | LAB        |              |
+-------------+----------+-----------+------------+--------------+
| RDW,        | 12.7     | 10.5 - 15 | EXTERNAL   |              |
| External    |          |           | LAB        |              |
+-------------+----------+-----------+------------+--------------+
 
 
 
+--------------------------+
| Resulting Agency Comment |
+--------------------------+
|   [facility]          |
+--------------------------+
 
 
 
+----------------+---------+--------------------+--------------+
| Performing     | Address | City/State/Zipcode | Phone Number |
| Organization   |         |                    |              |
+----------------+---------+--------------------+--------------+
|   EXTERNAL LAB |         |                    |              |
+----------------+---------+--------------------+--------------+
 External Lab: Protime INR (2015)
 
+-----------+-------+-------------+------------+--------------+
| Component | Value | Ref Range   | Performed  | Pathologist  |
|           |       |             | At         | Signature    |
+-----------+-------+-------------+------------+--------------+
| INR,      | 0.9   | 0.8 - 3.5   | EXTERNAL   |              |
| External  |       |             | LAB        |              |
+-----------+-------+-------------+------------+--------------+
| PT,       | 12.9  | 12.4 - 15.8 | EXTERNAL   |              |
| External  |       |             | LAB        |              |
+-----------+-------+-------------+------------+--------------+
 
 
 
+-----------------+
| Specimen        |
+-----------------+
| Blood specimen  |
| (specimen)      |
+-----------------+
 
 
 
 
+--------------------------+
| Resulting Agency Comment |
+--------------------------+
|   [facility]          |
+--------------------------+
 
 
 
+----------------+---------+--------------------+--------------+
| Performing     | Address | City/State/Zipcode | Phone Number |
| Organization   |         |                    |              |
+----------------+---------+--------------------+--------------+
|   EXTERNAL LAB |         |                    |              |
+----------------+---------+--------------------+--------------+
 Comprehensive Metabolic Panel (2015)
 
+-------------+-------+---------------+-------------+--------------+
| Component   | Value | Ref Range     | Performed   | Pathologist  |
|             |       |               | At          | Signature    |
+-------------+-------+---------------+-------------+--------------+
| Anion Gap   | 13    | 7 - 21 mmol/L | [facility]  |              |
|             |       |               | [facility]    |              |
|             |       |               | [facility]     |              |
|             |       |               | [facility] -    |              |
|             |       |               | LABORATORY  |              |
+-------------+-------+---------------+-------------+--------------+
| BUN/Creatin | 12    | 6 - 28.6      | [facility]  |              |
| ine Ratio   |       |               | [facility]    |              |
|             |       |               | [facility]     |              |
|             |       |               | [facility] -    |              |
|             |       |               | LABORATORY  |              |
+-------------+-------+---------------+-------------+--------------+
| Globulin    | 3     | 1.8 - 3.5     | [facility]  |              |
|             |       |               | [facility]    |              |
|             |       |               | [facility]     |              |
|             |       |               | [facility] -    |              |
|             |       |               | LABORATORY  |              |
+-------------+-------+---------------+-------------+--------------+
| Albumin/Radha | 1.5   | 1.1 - 2.4     | [facility]  |              |
| bulin Ratio |       |               | [facility]    |              |
|             |       |               | [facility]     |              |
|             |       |               | [facility] -    |              |
|             |       |               | LABORATORY  |              |
+-------------+-------+---------------+-------------+--------------+
 
 
 
+-----------------+
| Specimen        |
+-----------------+
| Blood specimen  |
| (specimen)      |
+-----------------+
 
 
 
+----------------------+--------------------+--------------------+----------------+
| Performing           | Address            | City/State/Zipcode | Phone Number   |
 
| Organization         |                    |                    |                |
+----------------------+--------------------+--------------------+----------------+
|   [facility]     |   [address] |   [place]  |   [phone] |
| [facility]  |                    | [address], [place]         |                |
| - LABORATORY         |                    |                    |                |
+----------------------+--------------------+--------------------+----------------+
 External Lab: BUN (2015)
 
+-----------+-------+-----------+------------+--------------+
| Component | Value | Ref Range | Performed  | Pathologist  |
|           |       |           | At         | Signature    |
+-----------+-------+-----------+------------+--------------+
| BUN,      | 9     | 6 - 23    | EXTERNAL   |              |
| External  |       |           | LAB        |              |
+-----------+-------+-----------+------------+--------------+
 
 
 
+--------------------------+
| Resulting Agency Comment |
+--------------------------+
|   [facility]          |
+--------------------------+
 
 
 
+----------------+---------+--------------------+--------------+
| Performing     | Address | City/State/Zipcode | Phone Number |
| Organization   |         |                    |              |
+----------------+---------+--------------------+--------------+
|   EXTERNAL LAB |         |                    |              |
+----------------+---------+--------------------+--------------+
 External Lab: Glucose (2015)
 
+-----------+---------+-----------+------------+--------------+
| Component | Value   | Ref Range | Performed  | Pathologist  |
|           |         |           | At         | Signature    |
+-----------+---------+-----------+------------+--------------+
| Glucose,  | 111 (A) | 70 - 100  | EXTERNAL   |              |
| External  |         |           | LAB        |              |
+-----------+---------+-----------+------------+--------------+
 
 
 
+--------------------------+
| Resulting Agency Comment |
+--------------------------+
|   [facility]          |
+--------------------------+
 
 
 
+----------------+---------+--------------------+--------------+
| Performing     | Address | City/State/Zipcode | Phone Number |
| Organization   |         |                    |              |
+----------------+---------+--------------------+--------------+
|   EXTERNAL LAB |         |                    |              |
+----------------+---------+--------------------+--------------+
 External Lab: ALT (2015)
 
 
+-----------+-------+-----------+------------+--------------+
| Component | Value | Ref Range | Performed  | Pathologist  |
|           |       |           | At         | Signature    |
+-----------+-------+-----------+------------+--------------+
| ALT,      | 9     | 7 - 52    | EXTERNAL   |              |
| External  |       |           | LAB        |              |
+-----------+-------+-----------+------------+--------------+
 
 
 
+--------------------------+
| Resulting Agency Comment |
+--------------------------+
|   [facility]          |
+--------------------------+
 
 
 
+----------------+---------+--------------------+--------------+
| Performing     | Address | City/State/Zipcode | Phone Number |
| Organization   |         |                    |              |
+----------------+---------+--------------------+--------------+
|   EXTERNAL LAB |         |                    |              |
+----------------+---------+--------------------+--------------+
 External Lab: AST (2015)
 
+-----------+-------+-----------+------------+--------------+
| Component | Value | Ref Range | Performed  | Pathologist  |
|           |       |           | At         | Signature    |
+-----------+-------+-----------+------------+--------------+
| AST,      | 16    | 13 - 39   | EXTERNAL   |              |
| External  |       |           | LAB        |              |
+-----------+-------+-----------+------------+--------------+
 
 
 
+--------------------------+
| Resulting Agency Comment |
+--------------------------+
|   [facility]          |
+--------------------------+
 
 
 
+----------------+---------+--------------------+--------------+
| Performing     | Address | City/State/Zipcode | Phone Number |
| Organization   |         |                    |              |
+----------------+---------+--------------------+--------------+
|   EXTERNAL LAB |         |                    |              |
+----------------+---------+--------------------+--------------+
 External Lab: Alkaline Phosphatase (2015)
 
+-----------+-------+-----------+------------+--------------+
| Component | Value | Ref Range | Performed  | Pathologist  |
|           |       |           | At         | Signature    |
+-----------+-------+-----------+------------+--------------+
| ALP,      | 61    | 30 - 128  | EXTERNAL   |              |
| External  |       |           | LAB        |              |
+-----------+-------+-----------+------------+--------------+
 
 
 
 
+--------------------------+
| Resulting Agency Comment |
+--------------------------+
|   [facility]          |
+--------------------------+
 
 
 
+----------------+---------+--------------------+--------------+
| Performing     | Address | City/State/Zipcode | Phone Number |
| Organization   |         |                    |              |
+----------------+---------+--------------------+--------------+
|   EXTERNAL LAB |         |                    |              |
+----------------+---------+--------------------+--------------+
 External Lab: Bilirubin, Total (2015)
 
+-------------+-------+-----------+------------+--------------+
| Component   | Value | Ref Range | Performed  | Pathologist  |
|             |       |           | At         | Signature    |
+-------------+-------+-----------+------------+--------------+
| Bilirubin,  | 1     | 0 - 1.2   | EXTERNAL   |              |
| Total,      |       |           | LAB        |              |
| External    |       |           |            |              |
+-------------+-------+-----------+------------+--------------+
 
 
 
+--------------------------+
| Resulting Agency Comment |
+--------------------------+
|   [facility]          |
+--------------------------+
 
 
 
+----------------+---------+--------------------+--------------+
| Performing     | Address | City/State/Zipcode | Phone Number |
| Organization   |         |                    |              |
+----------------+---------+--------------------+--------------+
|   EXTERNAL LAB |         |                    |              |
+----------------+---------+--------------------+--------------+
 External Lab: Albumin (2015)
 
+-----------+-------+-----------+------------+--------------+
| Component | Value | Ref Range | Performed  | Pathologist  |
|           |       |           | At         | Signature    |
+-----------+-------+-----------+------------+--------------+
| Albumin,  | 4.6   | 3.5 - 5   | EXTERNAL   |              |
| External  |       |           | LAB        |              |
+-----------+-------+-----------+------------+--------------+
 
 
 
+--------------------------+
| Resulting Agency Comment |
+--------------------------+
|   [facility]          |
+--------------------------+
 
 
 
 
+----------------+---------+--------------------+--------------+
| Performing     | Address | City/State/Zipcode | Phone Number |
| Organization   |         |                    |              |
+----------------+---------+--------------------+--------------+
|   EXTERNAL LAB |         |                    |              |
+----------------+---------+--------------------+--------------+
 External Lab: Protein, Total (2015)
 
+-----------+-------+-----------+------------+--------------+
| Component | Value | Ref Range | Performed  | Pathologist  |
|           |       |           | At         | Signature    |
+-----------+-------+-----------+------------+--------------+
| Protein,  | 7.6   | 6 - 8     | EXTERNAL   |              |
| Total,    |       |           | LAB        |              |
| External  |       |           |            |              |
+-----------+-------+-----------+------------+--------------+
 
 
 
+--------------------------+
| Resulting Agency Comment |
+--------------------------+
|   [facility]          |
+--------------------------+
 
 
 
+----------------+---------+--------------------+--------------+
| Performing     | Address | City/State/Zipcode | Phone Number |
| Organization   |         |                    |              |
+----------------+---------+--------------------+--------------+
|   EXTERNAL LAB |         |                    |              |
+----------------+---------+--------------------+--------------+
 External Lab: Calcium (2015)
 
+-----------+-------+------------+------------+--------------+
| Component | Value | Ref Range  | Performed  | Pathologist  |
|           |       |            | At         | Signature    |
+-----------+-------+------------+------------+--------------+
| Calcium,  | 9.6   | 8.4 - 10.2 | EXTERNAL   |              |
| External  |       |            | LAB        |              |
+-----------+-------+------------+------------+--------------+
 
 
 
+--------------------------+
| Resulting Agency Comment |
+--------------------------+
|   [facility]          |
+--------------------------+
 
 
 
+----------------+---------+--------------------+--------------+
| Performing     | Address | City/State/Zipcode | Phone Number |
| Organization   |         |                    |              |
+----------------+---------+--------------------+--------------+
 
|   EXTERNAL LAB |         |                    |              |
+----------------+---------+--------------------+--------------+
 External Lab: Carbon Dioxide (2015)
 
+-----------+-------+-----------+------------+--------------+
| Component | Value | Ref Range | Performed  | Pathologist  |
|           |       |           | At         | Signature    |
+-----------+-------+-----------+------------+--------------+
| Carbon    | 25    | 19 - 31   | EXTERNAL   |              |
| Dioxide,  |       |           | LAB        |              |
| External  |       |           |            |              |
+-----------+-------+-----------+------------+--------------+
 
 
 
+--------------------------+
| Resulting Agency Comment |
+--------------------------+
|   [facility]          |
+--------------------------+
 
 
 
+----------------+---------+--------------------+--------------+
| Performing     | Address | City/State/Zipcode | Phone Number |
| Organization   |         |                    |              |
+----------------+---------+--------------------+--------------+
|   EXTERNAL LAB |         |                    |              |
+----------------+---------+--------------------+--------------+
 External Lab: Chloride (2015)
 
+------------+-------+-----------+------------+--------------+
| Component  | Value | Ref Range | Performed  | Pathologist  |
|            |       |           | At         | Signature    |
+------------+-------+-----------+------------+--------------+
| Chloride,  | 104   | 95 - 112  | EXTERNAL   |              |
| External   |       |           | LAB        |              |
+------------+-------+-----------+------------+--------------+
 
 
 
+--------------------------+
| Resulting Agency Comment |
+--------------------------+
|   [facility]          |
+--------------------------+
 
 
 
+----------------+---------+--------------------+--------------+
| Performing     | Address | City/State/Zipcode | Phone Number |
| Organization   |         |                    |              |
+----------------+---------+--------------------+--------------+
|   EXTERNAL LAB |         |                    |              |
+----------------+---------+--------------------+--------------+
 External Lab: Potassium (2015)
 
+-------------+-------+-----------+------------+--------------+
| Component   | Value | Ref Range | Performed  | Pathologist  |
|             |       |           | At         | Signature    |
 
+-------------+-------+-----------+------------+--------------+
| Potassium,  | 3.8   | 3.6 - 5.1 | EXTERNAL   |              |
| External    |       |           | LAB        |              |
+-------------+-------+-----------+------------+--------------+
 
 
 
+--------------------------+
| Resulting Agency Comment |
+--------------------------+
|   [facility]          |
+--------------------------+
 
 
 
+----------------+---------+--------------------+--------------+
| Performing     | Address | City/State/Zipcode | Phone Number |
| Organization   |         |                    |              |
+----------------+---------+--------------------+--------------+
|   EXTERNAL LAB |         |                    |              |
+----------------+---------+--------------------+--------------+
 External Lab: Sodium (2015)
 
+-----------+-------+-----------+------------+--------------+
| Component | Value | Ref Range | Performed  | Pathologist  |
|           |       |           | At         | Signature    |
+-----------+-------+-----------+------------+--------------+
| Sodium,   | 138   | 132 - 143 | EXTERNAL   |              |
| External  |       |           | LAB        |              |
+-----------+-------+-----------+------------+--------------+
 
 
 
+--------------------------+
| Resulting Agency Comment |
+--------------------------+
|   [facility]          |
+--------------------------+
 
 
 
+----------------+---------+--------------------+--------------+
| Performing     | Address | City/State/Zipcode | Phone Number |
| Organization   |         |                    |              |
+----------------+---------+--------------------+--------------+
|   EXTERNAL LAB |         |                    |              |
+----------------+---------+--------------------+--------------+
 External Lab: eGFR (2015)
 
+-----------+-------+-------------+------------+--------------+
| Component | Value | Ref Range   | Performed  | Pathologist  |
|           |       |             | At         | Signature    |
+-----------+-------+-------------+------------+--------------+
| eGFR,     | 76    | 60 - 99,999 | EXTERNAL   |              |
| External  |       |             | LAB        |              |
+-----------+-------+-------------+------------+--------------+
 
 
 
+-----------------+
 
| Specimen        |
+-----------------+
| Blood specimen  |
| (specimen)      |
+-----------------+
 
 
 
+--------------------------+
| Resulting Agency Comment |
+--------------------------+
|   [facility]          |
+--------------------------+
 
 
 
+----------------+---------+--------------------+--------------+
| Performing     | Address | City/State/Zipcode | Phone Number |
| Organization   |         |                    |              |
+----------------+---------+--------------------+--------------+
|   EXTERNAL LAB |         |                    |              |
+----------------+---------+--------------------+--------------+
 External Lab: Creatinine (2015)
 
+-------------+-------+------------+------------+--------------+
| Component   | Value | Ref Range  | Performed  | Pathologist  |
|             |       |            | At         | Signature    |
+-------------+-------+------------+------------+--------------+
| Creatinine, | 0.75  | 0.7 - 1.18 | EXTERNAL   |              |
|  External   |       |            | LAB        |              |
+-------------+-------+------------+------------+--------------+
 
 
 
+-----------------+
| Specimen        |
+-----------------+
| Blood specimen  |
| (specimen)      |
+-----------------+
 
 
 
+--------------------------+
| Resulting Agency Comment |
+--------------------------+
|   [facility]          |
+--------------------------+
 
 
 
+----------------+---------+--------------------+--------------+
| Performing     | Address | City/State/Zipcode | Phone Number |
| Organization   |         |                    |              |
+----------------+---------+--------------------+--------------+
|   EXTERNAL LAB |         |                    |              |
+----------------+---------+--------------------+--------------+
 documented in this encounter
 
 Visit Diagnoses
 
 Not on filedocumented in this encounter

## 2020-08-02 NOTE — XMS
Clinical Summary
  Created on: 2020
 
 Laura Castelan
 External Reference #: [id number]
 : 42
 Sex: Female
 
 Demographics
 
 
+-----------------------+----------------------+
| Address               | [address]    |
|                       | [place]  [address] |
+-----------------------+----------------------+
| Home Phone            | [phone]      |
+-----------------------+----------------------+
| Preferred Language    | Unknown              |
+-----------------------+----------------------+
| Marital Status        |              |
+-----------------------+----------------------+
| Taoism Affiliation | Unknown              |
+-----------------------+----------------------+
| Race                  | Unknown              |
+-----------------------+----------------------+
| Ethnic Group          | Unknown              |
+-----------------------+----------------------+
 
 
 Author
 
 
+--------------+--------------------------------------------+
| Author       | [facility]  |
|              | and [facility]                                |
+--------------+--------------------------------------------+
| Organization | [facility]  |
|              | and [facility]                                |
+--------------+--------------------------------------------+
| Address      | Unknown                                    |
+--------------+--------------------------------------------+
| Phone        | Unavailable                                |
+--------------+--------------------------------------------+
 
 
 
 Support
 
 
+---------------+--------------+-------------------+-----------------+
| Name          | Relationship | Address           | Phone           |
+---------------+--------------+-------------------+-----------------+
| Nawaf Carrera | E[place]         | [address]  | [phone] |
|               |              | [place]   |                 |
|               |              | [address]             |                 |
+---------------+--------------+-------------------+-----------------+
 
 
 
 
 Care Team Providers
 
 
+----------------------------+------+-----------------+
| Care Team Member Name      | Role | Phone           |
+----------------------------+------+-----------------+
| Carmine Gomez  | PCP  | [phone] |
| MD                         |      |                 |
+----------------------------+------+-----------------+
 
 
 
 Allergies
 
 
+-------------------+----------------------+----------+----------+------------------+
| Active Allergy    | Reactions            | Severity | Noted    | Comments         |
|                   |                      |          | Date     |                  |
+-------------------+----------------------+----------+----------+------------------+
| Codeine           | Other (See Comments) |          | 20 |   hallucinations |
|                   |                      |          | 16       |                  |
+-------------------+----------------------+----------+----------+------------------+
| Doxycycline       | Diarrhea, Nausea And |          | 20 |                  |
|                   |  Vomiting            |          | 16       |                  |
+-------------------+----------------------+----------+----------+------------------+
| Penicillins       | Hives, Rash          | Low      | 20 |                  |
|                   |                      |          | 16       |                  |
+-------------------+----------------------+----------+----------+------------------+
| Sulfa Antibiotics | Hives, Rash          | Low      | 20 |                  |
|                   |                      |          | 16       |                  |
+-------------------+----------------------+----------+----------+------------------+
 
 
 
 Medications
 
 
+---------------------+----------------------+-----------+---------+------+------+-------+
| Medication          | Sig                  | Dispensed | Refills | Star | End  | Statu |
|                     |                      |           |         | t    | Date | s     |
|                     |                      |           |         | Date |      |       |
+---------------------+----------------------+-----------+---------+------+------+-------+
|   ranitidine        | Take 300 mg by mouth |           | 0       |      |      | Activ |
| (ZANTAC) 300 MG     |  nightly.            |           |         |      |      | e     |
| capsule             |                      |           |         |      |      |       |
+---------------------+----------------------+-----------+---------+------+------+-------+
|   aspirin 81 MG     | Take 81 mg by mouth  |           | 0       |      |      | Activ |
| tablet              | Daily.               |           |         |      |      | e     |
+---------------------+----------------------+-----------+---------+------+------+-------+
|   cyanocobalamin    | Take 5,000 mcg by    |           | 0       |      |      | Activ |
| (VITAMIN B-12) 500  | mouth Daily.         |           |         |      |      | e     |
| mcg tablet          |                      |           |         |      |      |       |
+---------------------+----------------------+-----------+---------+------+------+-------+
|   alendronate       | Take 70 mg by mouth  |           | 0       |      |      | Activ |
| (FOSAMAX) 70 mg     | Once a week.         |           |         |      |      | e     |
| tablet              |                      |           |         |      |      |       |
+---------------------+----------------------+-----------+---------+------+------+-------+
|   Cholecalciferol   | Take 5,000 Units by  |           | 0       |      |      | Activ |
| (VITAMIN D-3) 2000  | mouth Daily.         |           |         |      |      | e     |
 
| units CAPS          |                      |           |         |      |      |       |
+---------------------+----------------------+-----------+---------+------+------+-------+
 
 
 
 Active Problems
 
 
+--------------+------------+
| Problem      | Noted Date |
+--------------+------------+
| Abnormal EKG | 2016 |
+--------------+------------+
 
 
 
+------------------------------------------------------------------+
|   Overview:   Echocadriogram, 2016 shows normal left        |
| ventricular size, wall thickness and motion, preserved left      |
| ventricular systolic function, LVEF is 65%, grade 1 left         |
| ventricular diastolic dysfunction, normal valvular structure,    |
| normal right-sided pressure, normal IVC with normal respiratory  |
| collapse.                                                        |
+------------------------------------------------------------------+
 
 
 
+------------------+------------+
| Other chest pain | 2016 |
+------------------+------------+
 
 
 
+------------------------------------------------------------------+
|   Overview:   Persantine Sestamibi Stress Test, 2016 shows  |
| Persantine EKG is negative, normal  Persantine Sestamibi         |
| myocardial perfusion study with a normal left ventricular size   |
| and wall thickness, preserved left ventricular systolic          |
| function,   LVEF by gated SPECT 80 %.                            |
+------------------------------------------------------------------+
 
 
 
+----------------------------------------+---+
| Bundle branch block, right             |   |
+----------------------------------------+---+
| Cystitis, subacute                     |   |
+----------------------------------------+---+
| Essential hypertension, benign         |   |
+----------------------------------------+---+
| GERD (gastroesophageal reflux disease) |   |
+----------------------------------------+---+
| Right nephrolithiasis                  |   |
+----------------------------------------+---+
| Osteoporosis, postmenopausal           |   |
+----------------------------------------+---+
| Vitamin D deficiency                   |   |
+----------------------------------------+---+
 
 
 
 
 Family History
 
 
+-----------------+----------+------+------------------+
| Medical History | Relation | Name | Comments         |
+-----------------+----------+------+------------------+
| Cirrhosis       | Father   |      |                  |
+-----------------+----------+------+------------------+
| Diabetes        | Mother   |      |                  |
+-----------------+----------+------+------------------+
| Heart disease   | Mother   |      | arteriosclerotic |
+-----------------+----------+------+------------------+
 
 
 
+----------+------+----------+----------+
| Relation | Name | Status   | Comments |
+----------+------+----------+----------+
| Brother  |      | Alive    |          |
+----------+------+----------+----------+
| Brother  |      | Alive    |          |
+----------+------+----------+----------+
| Brother  |      |  |          |
+----------+------+----------+----------+
| Brother  |      |  |          |
+----------+------+----------+----------+
| Father   |      |  |          |
|          |      |   (Age   |          |
|          |      | 58)      |          |
+----------+------+----------+----------+
| Mother   |      |  |          |
|          |      |   (Age   |          |
|          |      | 62)      |          |
+----------+------+----------+----------+
| Sister   |      | Alive    |          |
+----------+------+----------+----------+
| Sister   |      | Alive    |          |
+----------+------+----------+----------+
| Sister   |      | Alive    |          |
+----------+------+----------+----------+
| Sister   |      | Alive    |          |
+----------+------+----------+----------+
| Sister   |      |  |          |
+----------+------+----------+----------+
| Sister   |      |  |          |
+----------+------+----------+----------+
| Sister   |      |  |          |
+----------+------+----------+----------+
| Sister   |      |  |          |
+----------+------+----------+----------+
 
 
 
 Social History
 
 
+---------------+-------+-----------+--------+------+
| Tobacco Use   | Types | Packs/Day | Years  | Date |
|               |       |           | Used   |      |
 
+---------------+-------+-----------+--------+------+
| Former Smoker |       |           |        |      |
+---------------+-------+-----------+--------+------+
 
 
 
+---------------------+---+---+---+
| Smokeless Tobacco:  |   |   |   |
| Never Used          |   |   |   |
+---------------------+---+---+---+
 
 
 
+------------------------+
| Comments: quit in  |
+------------------------+
 
 
 
+-------------+----------------------+---------+----------+
| Alcohol Use | Drinks/Week          | oz/Week | Comments |
+-------------+----------------------+---------+----------+
| No          |   0 Standard drinks  | 0.0     |          |
|             | or equivalent        |         |          |
+-------------+----------------------+---------+----------+
 
 
 
+------------------+---------------+
| Sex Assigned at  | Date Recorded |
| Birth            |               |
+------------------+---------------+
| Not on file      |               |
+------------------+---------------+
 
 
 
 Last Filed Vital Signs
 
 
+-------------------+----------------------+----------------------+-----------+
| Vital Sign        | Reading              | Time Taken           | Comments  |
+-------------------+----------------------+----------------------+-----------+
| Blood Pressure    | 130/80               | 2016  3:40 PM  | right arm |
|                   |                      | PDT                  |           |
+-------------------+----------------------+----------------------+-----------+
| Pulse             | 80                   | 2016  3:40 PM  | regular   |
|                   |                      | PDT                  |           |
+-------------------+----------------------+----------------------+-----------+
| Temperature       | -                    | -                    |           |
+-------------------+----------------------+----------------------+-----------+
| Respiratory Rate  | 16                   | 2016  3:40 PM  |           |
|                   |                      | PDT                  |           |
+-------------------+----------------------+----------------------+-----------+
| Oxygen Saturation | -                    | -                    |           |
+-------------------+----------------------+----------------------+-----------+
| Inhaled Oxygen    | -                    | -                    |           |
| Concentration     |                      |                      |           |
+-------------------+----------------------+----------------------+-----------+
| Weight            | 73.6 kg (162 lb 3.2  | 2016  3:40 PM  |           |
 
|                   | oz)                  | PDT                  |           |
+-------------------+----------------------+----------------------+-----------+
| Height            | 160 cm (5' 3")       | 2016  3:40 PM  |           |
|                   |                      | PDT                  |           |
+-------------------+----------------------+----------------------+-----------+
| Body Mass Index   | 28.73                | 2016  3:40 PM  |           |
|                   |                      | PDT                  |           |
+-------------------+----------------------+----------------------+-----------+
 
 
 
 Plan of Treatment
 
 
+----------------------+-----------+-------+----------+
| Health Maintenance   | Due Date  | Last  | Comments |
|                      |           | Done  |          |
+----------------------+-----------+-------+----------+
| Vaccine:             |  |       |          |
| Dtap/Tdap/Td (1 -    | 0         |       |          |
| Tdap)                |           |       |          |
+----------------------+-----------+-------+----------+
| Vaccine: Zoster (1   |  |       |          |
| of 2)                | 1         |       |          |
+----------------------+-----------+-------+----------+
| Breast Cancer        |  |       |          |
| Screening            | 6         |       |          |
+----------------------+-----------+-------+----------+
| Vaccine:             |  |       |          |
| Pneumococcal 65+ (1  | 6         |       |          |
| of 1 - PPSV23)       |           |       |          |
+----------------------+-----------+-------+----------+
| Vaccine: Influenza   |  |       |          |
| (#1)                 | 0         |       |          |
+----------------------+-----------+-------+----------+
 
 
 
 Results
 Not on filefrom Last 3 Months
 
 Insurance
 
 
+----------------------+--------+-------------+--------+-------------+---------+--------+
| Payer                | Benefi | Subscriber  | Effect | Phone       | Address | Type   |
|                      | t Plan | ID          | julianna    |             |         |        |
|                      |  /     |             | Dates  |             |         |        |
|                      | Group  |             |        |             |         |        |
+----------------------+--------+-------------+--------+-------------+---------+--------+
| MEDICARE             | MEDICA | [id number]  | 20 | [phone] |         | Medica |
|                      | RE     |             | 06-Pre | 5           |         | re     |
|                      | PART A |             | sent   |             |         |        |
|                      |  AND B |             |        |             |         |        |
+----------------------+--------+-------------+--------+-------------+---------+--------+
| MEDICARE SUPPLEMENT  | MEDICA | [id number]     | 20 | [phone] |         | Indemn |
| OTHER                | RE     |             | 17-Pre | 0           |         | ity    |
|                      | SUPPLE |             | sent   |             |         |        |
|                      | MENT   |             |        |             |         |        |
|                      | OTHER  |             |        |             |         |        |
 
+----------------------+--------+-------------+--------+-------------+---------+--------+
 
 
 
+-------------------+--------+-------------+--------+-------------+----------------------+
| Guarantor Name    | Accoun | Relation to | Date   | Phone       | Billing Address      |
|                   | t Type |  Patient    | of     |             |                      |
|                   |        |             | Birth  |             |                      |
+-------------------+--------+-------------+--------+-------------+----------------------+
| Castelan,[place]  | Person | Self        | / |             |   [address]  |
| Su            | al/Fam |             | 1940   | [phone] |  [place] [address] |
|                   | igor    |             |        | 1 (Home)    |                      |
+-------------------+--------+-------------+--------+-------------+----------------------+
 
 
 
 Advance Directives
 
 
+-----------+-----------------+----------------+-------------+
| Type      | Date Recorded   | Patient        | Explanation |
|           |                 | Representative |             |
+-----------+-----------------+----------------+-------------+
| Power of  |                 |                |             |
|   |                 |                |             |
+-----------+-----------------+----------------+-------------+
| Advance   | 2016  7:40 |                |             |
| Directive |  AM             |                |             |
+-----------+-----------------+----------------+-------------+

## 2020-08-02 NOTE — XMS
Encounter Summary
  Created on: 2020
 
 Flip [place] Su
 External Reference #: [id number]
 : 42
 Sex: Female
 
 Demographics
 
 
+-----------------------+----------------------+
| Address               | [address]    |
|                       | [place]  [address] |
+-----------------------+----------------------+
| Home Phone            | [phone]      |
+-----------------------+----------------------+
| Preferred Language    | Unknown              |
+-----------------------+----------------------+
| Marital Status        |              |
+-----------------------+----------------------+
| Religion Affiliation | Unknown              |
+-----------------------+----------------------+
| Race                  | Unknown              |
+-----------------------+----------------------+
| Ethnic Group          | Unknown              |
+-----------------------+----------------------+
 
 
 Author
 
 
+--------------+--------------------------------------------+
| Author       | [facility]  |
|              | and [facility]                                |
+--------------+--------------------------------------------+
| Organization | [facility]  |
|              | and [facility]                                |
+--------------+--------------------------------------------+
| Address      | Unknown                                    |
+--------------+--------------------------------------------+
| Phone        | Unavailable                                |
+--------------+--------------------------------------------+
 
 
 
 Support
 
 
+---------------+--------------+-------------------+-----------------+
| Name          | Relationship | Address           | Phone           |
+---------------+--------------+-------------------+-----------------+
| Nawaf Carrera | E[place]         | [address]  | [phone] |
|               |              | [place]   |                 |
|               |              | [address]             |                 |
+---------------+--------------+-------------------+-----------------+
 
 
 
 
 Care Team Providers
 
 
+----------------------------+------+-----------------+
| Care Team Member Name      | Role | Phone           |
+----------------------------+------+-----------------+
| Carmine Gomez PCP  | [phone] |
| MD                         |      |                 |
+----------------------------+------+-----------------+
 
 
 
 Reason for Visit
 
 
+-------------+--------+---------------+
| Reason      | Onset  | Comments      |
|             | Date   |               |
+-------------+--------+---------------+
| Appointment | / | AUGUST RECALL |
|             |    |               |
+-------------+--------+---------------+
 
 
 
 Encounter Details
 
 
+--------+-----------+----------------------+----------------------+----------------------+
| Date   | Type      | Department           | Care Team            | Description          |
+--------+-----------+----------------------+----------------------+----------------------+
| / | Telephone |   [facility]          |   Paul Thomson, | Appointment (AUGUST  |
|    |           | CARDIOLOGY  [address]    |  MD  [address] | RECALL)              |
|        |           | [place]  [place], |  [address],   |                      |
|        |           |  [place] [address]       | [place] [address]             |                      |
|        |           | [phone]         | [phone]         |                      |
|        |           |                      | [phone] (Fax)   |                      |
+--------+-----------+----------------------+----------------------+----------------------+
 
 
 
 Social History
 
 
+---------------+-------+-----------+--------+------+
| Tobacco Use   | Types | Packs/Day | Years  | Date |
|               |       |           | Used   |      |
+---------------+-------+-----------+--------+------+
| Former Smoker |       |           |        |      |
+---------------+-------+-----------+--------+------+
 
 
 
+---------------------+---+---+---+
| Smokeless Tobacco:  |   |   |   |
| Never Used          |   |   |   |
+---------------------+---+---+---+
 
 
 
 
+------------------------+
| Comments: quit in  |
+------------------------+
 
 
 
+-------------+----------------------+---------+----------+
| Alcohol Use | Drinks/Week          | oz/Week | Comments |
+-------------+----------------------+---------+----------+
| No          |   0 Standard drinks  | 0.0     |          |
|             | or equivalent        |         |          |
+-------------+----------------------+---------+----------+
 
 
 
+------------------+---------------+
| Sex Assigned at  | Date Recorded |
| Birth            |               |
+------------------+---------------+
| Not on file      |               |
+------------------+---------------+
 documented as of this encounter
 
 Miscellaneous Notes
 Telephone Encounter - Mary Collier - 2017  4:28 PM PDTPatient last saw Dr. Jefferson on 
16 and was told to follow up as needed. Called patient to offer a yearly follow up. She 
declined and stated she is not having any issues but would call our office if she needed to.
Electronically signed by Mary Collier at 2017  4:30 PM PDTdocumented in this encounte
r
 
 Plan of Treatment
 Not on filedocumented as of this encounter
 
 Visit Diagnoses
 Not on filedocumented in this encounter

## 2020-08-02 NOTE — XMS
Encounter Summary
  Created on: 2020
 
 Flip Virginia Su
 External Reference #: [id number]
 : 42
 Sex: Female
 
 Demographics
 
 
+-----------------------+----------------------+
| Address               | [address]    |
|                       | [place]  [address] |
+-----------------------+----------------------+
| Home Phone            | [phone]      |
+-----------------------+----------------------+
| Preferred Language    | Unknown              |
+-----------------------+----------------------+
| Marital Status        |              |
+-----------------------+----------------------+
| Evangelical Affiliation | Unknown              |
+-----------------------+----------------------+
| Race                  | Unknown              |
+-----------------------+----------------------+
| Ethnic Group          | Unknown              |
+-----------------------+----------------------+
 
 
 Author
 
 
+--------------+--------------------------------------------+
| Author       | [facility]  |
|              | and [facility]                                |
+--------------+--------------------------------------------+
| Organization | [facility]  |
|              | and [facility]                                |
+--------------+--------------------------------------------+
| Address      | Unknown                                    |
+--------------+--------------------------------------------+
| Phone        | Unavailable                                |
+--------------+--------------------------------------------+
 
 
 
 Support
 
 
+---------------+--------------+-------------------+-----------------+
| Name          | Relationship | Address           | Phone           |
+---------------+--------------+-------------------+-----------------+
| Nawaf Carrera | E[place]         | [address]  | [phone] |
|               |              | [place]   |                 |
|               |              | [address]             |                 |
+---------------+--------------+-------------------+-----------------+
 
 
 
 
 Care Team Providers
 
 
+----------------------------+------+-----------------+
| Care Team Member Name      | Role | Phone           |
+----------------------------+------+-----------------+
| Carmine Gomez  | PCP  | [phone] |
| MD                         |      |                 |
+----------------------------+------+-----------------+
 
 
 
 Reason for Visit
 Diagnostic/Screening (Routine)
 
+--------+--------+-----------+--------------+--------------+---------------+
| Status | Reason | Specialty | Diagnoses /  | Referred By  | Referred To   |
|        |        |           | Procedures   | Contact      | Contact       |
+--------+--------+-----------+--------------+--------------+---------------+
| Closed |        | Radiology |   Diagnoses  |   [place], |   [facility] |
|        |        |           |  Abnormal    |  MD Paul  |  Medicine     |
|        |        |           | electrocardi |  [address]    | [address]  |
|        |        |           | ogram (ECG)  | [address]   |  [place], |
|        |        |           | (EKG)        | [place], |  [place]           |
|        |        |           | Encounter    |  [place] [address]    | [address]    |
|        |        |           | for other    | Phone:       | Phone:        |
|        |        |           | preprocedura | [phone] | [phone]  |
|        |        |           | l            |   Fax:       |  Fax:         |
|        |        |           | examination  | [phone] | [phone]  |
|        |        |           |  Procedures  |              |               |
|        |        |           |  NM Nuclear  |              |               |
|        |        |           | Stress Test  |              |               |
|        |        |           | (Vasodilator |              |               |
|        |        |           | )  CHG       |              |               |
|        |        |           | MYOCARDIAL   |              |               |
|        |        |           | SPECT        |              |               |
|        |        |           | MULTIPLE     |              |               |
|        |        |           | STUDIES  ME  |              |               |
|        |        |           | CV STRS TST  |              |               |
|        |        |           | XERS&/OR RX  |              |               |
|        |        |           | CONT ECG W/O |              |               |
|        |        |           |  I&R  ME     |              |               |
|        |        |           | CARDIAC      |              |               |
|        |        |           | STRESS       |              |               |
|        |        |           | TST,INTERP/R |              |               |
|        |        |           | EPT ONLY     |              |               |
+--------+--------+-----------+--------------+--------------+---------------+
 
 
 
 
 Encounter Details
 
 
+--------+-----------+----------------------+----------------------+-------------+
| Date   | Type      | Department           | Care Team            | Description |
+--------+-----------+----------------------+----------------------+-------------+
| / | Hospital  |   [facility] |   [facility], |             |
| 2016   | Encounter |  MED CTR NUCLEAR     |  MD  [address] |             |
 
|        |           | MEDICINE  [address]      |  [address]  [place],   |             |
|        |           | [address]  [place], | [place] [address]             |             |
|        |           |  [place] [address]       | [phone]         |             |
|        |           | [phone]         | [phone] (Fax)   |             |
+--------+-----------+----------------------+----------------------+-------------+
 
 
 
 Social History
 
 
+---------------+-------+-----------+--------+------+
| Tobacco Use   | Types | Packs/Day | Years  | Date |
|               |       |           | Used   |      |
+---------------+-------+-----------+--------+------+
| Former Smoker |       |           |        |      |
+---------------+-------+-----------+--------+------+
 
 
 
+---------------------+---+---+---+
| Smokeless Tobacco:  |   |   |   |
| Never Used          |   |   |   |
+---------------------+---+---+---+
 
 
 
+------------------------+
| Comments: quit in  |
+------------------------+
 
 
 
+-------------+----------------------+---------+----------+
| Alcohol Use | Drinks/Week          | oz/Week | Comments |
+-------------+----------------------+---------+----------+
| No          |   0 Standard drinks  | 0.0     |          |
|             | or equivalent        |         |          |
+-------------+----------------------+---------+----------+
 
 
 
+------------------+---------------+
| Sex Assigned at  | Date Recorded |
| Birth            |               |
+------------------+---------------+
| Not on file      |               |
+------------------+---------------+
 documented as of this encounter
 
 Medications at Time of Discharge
 
 
+---------------------+----------------------+-----------+---------+--------+----------+
| Medication          | Sig                  | Dispensed | Refills | Start  | End Date |
|                     |                      |           |         | Date   |          |
+---------------------+----------------------+-----------+---------+--------+----------+
|   alendronate       | Take 70 mg by mouth  |           | 0       |        |          |
| (FOSAMAX) 70 mg     | Once a week.         |           |         |        |          |
| tablet              |                      |           |         |        |          |
 
+---------------------+----------------------+-----------+---------+--------+----------+
|   aspirin 81 MG     | Take 81 mg by mouth  |           | 0       |        |          |
| tablet              | Daily.               |           |         |        |          |
+---------------------+----------------------+-----------+---------+--------+----------+
|   Cholecalciferol   | Take 5,000 Units by  |           | 0       |        |          |
| (VITAMIN D-3) 2000  | mouth Daily.         |           |         |        |          |
| units CAPS          |                      |           |         |        |          |
+---------------------+----------------------+-----------+---------+--------+----------+
|   cyanocobalamin    | Take 5,000 mcg by    |           | 0       |        |          |
| (VITAMIN B-12) 500  | mouth Daily.         |           |         |        |          |
| mcg tablet          |                      |           |         |        |          |
+---------------------+----------------------+-----------+---------+--------+----------+
|   ranitidine        | Take 300 mg by mouth |           | 0       |        |          |
| (ZANTAC) 300 MG     |  nightly.            |           |         |        |          |
| capsule             |                      |           |         |        |          |
+---------------------+----------------------+-----------+---------+--------+----------+
 documented as of this encounter
 
 Plan of Treatment
 Not on filedocumented as of this encounter
 
 Procedures
 
 
+----------------------+--------+-------------+----------------------+----------------------
+
| Procedure Name       | Priori | Date/Time   | Associated Diagnosis | Comments             
|
|                      | ty     |             |                      |                      
|
+----------------------+--------+-------------+----------------------+----------------------
+
| NM NUCLEAR STRESS    | Routin | 2016  |   Pre-op exam        |   Results for this   
|
| TEST (PHARMACOLOGIC  | e      |  1:27 PM    | Abnormal EKG         | procedure are in the 
|
| - VASODILATOR)       |        | PDT         |                      |  results section.    
|
+----------------------+--------+-------------+----------------------+----------------------
+
 documented in this encounter
 
 Visit Diagnoses
 Not on filedocumented in this encounter
 
 Administered Medications
 
 
+-----------------------------------+--------+----------+----------+------+------+
| Medication Order                  | MAR    | Action   | Dose     | Rate | Site |
|                                   | Action | Date     |          |      |      |
+-----------------------------------+--------+----------+----------+------+------+
|   technetium TC-99M sestamibi     | Given  | 20 | 34.1     |      |      |
| (CARDIOLITE) injection          |        | 16 12:40 | -millicu |      |      |
| millicurie  30 -millicurie,       |        |  PM PDT  | keeley      |      |      |
| Intravenous, ONCE PRN, Other,     |        |          |          |      |      |
| Starting Wed 16 at 1239, For |        |          |          |      |      |
|  1 dose, Nuclear Medicine         |        |          |          |      |      |
+-----------------------------------+--------+----------+----------+------+------+
 
 
 
 
+---+---+
|   |   |
+---+---+
 documented in this encounter

## 2020-08-02 NOTE — XMS
Encounter Summary
  Created on: 2020
 
 Flip Virginia Su
 External Reference #: [id number]
 : 42
 Sex: Female
 
 Demographics
 
 
+-----------------------+----------------------+
| Address               | [address]    |
|                       | [place]  [address] |
+-----------------------+----------------------+
| Home Phone            | [phone]      |
+-----------------------+----------------------+
| Preferred Language    | Unknown              |
+-----------------------+----------------------+
| Marital Status        |              |
+-----------------------+----------------------+
| Caodaism Affiliation | Unknown              |
+-----------------------+----------------------+
| Race                  | Unknown              |
+-----------------------+----------------------+
| Ethnic Group          | Unknown              |
+-----------------------+----------------------+
 
 
 Author
 
 
+--------------+--------------------------------------------+
| Author       | [facility]  |
|              | [facility]                                |
+--------------+--------------------------------------------+
| Organization | [facility]  |
|              | [facility]                                |
+--------------+--------------------------------------------+
| Address      | Unknown                                    |
+--------------+--------------------------------------------+
| Phone        | Unavailable                                |
+--------------+--------------------------------------------+
 
 
 
 Support
 
 
+---------------+--------------+-------------------+-----------------+
| Name          | Relationship | Address           | Phone           |
+---------------+--------------+-------------------+-----------------+
| Nawaf Carrera | ECON         | [address]  | [phone] |
|               |              | [place], [place]   |                 |
|               |              | [address]             |                 |
+---------------+--------------+-------------------+-----------------+
 
 
 
 
 Care Team Providers
 
 
+----------------------------+------+-----------------+
| Care Team Member Name      | Role | Phone           |
+----------------------------+------+-----------------+
| Carmine Gomez  | PCP  | [phone] |
| MD                         |      |                 |
+----------------------------+------+-----------------+
 
 
 
 Reason for Referral
 Diagnostic/Screening (Routine)
 
+--------+--------+-----------+--------------+--------------+---------------+
| Status | Reason | Specialty | Diagnoses /  | Referred By  | Referred To   |
|        |        |           | Procedures   | Contact      | Contact       |
+--------+--------+-----------+--------------+--------------+---------------+
| Closed |        | Radiology |   Diagnoses  |   [place], |   [facility] |
|        |        |           |  Abnormal    |  MD Paul  |  Medicine     |
|        |        |           | electrocardi |  [address]    | [address]  |
|        |        |           | ogram (ECG)  | [address]   |  [place], |
|        |        |           | (EKG)        | [place], |  [place]           |
|        |        |           | Encounter    |  [place] [address]    | [address]    |
|        |        |           | for other    | Phone:       | Phone:        |
|        |        |           | preprocedura | [phone] | [phone]  |
|        |        |           | l            |   Fax:       |  Fax:         |
|        |        |           | examination  | [phone] | [phone]  |
|        |        |           |  Procedures  |              |               |
|        |        |           |  NM Nuclear  |              |               |
|        |        |           | Stress Test  |              |               |
|        |        |           | (Vasodilator |              |               |
|        |        |           | )  CHG       |              |               |
|        |        |           | MYOCARDIAL   |              |               |
|        |        |           | SPECT        |              |               |
|        |        |           | MULTIPLE     |              |               |
|        |        |           | STUDIES  MI  |              |               |
|        |        |           | CV STRS TST  |              |               |
|        |        |           | XERS&/OR RX  |              |               |
|        |        |           | CONT ECG W/O |              |               |
|        |        |           |  I&R  MI     |              |               |
|        |        |           | CARDIAC      |              |               |
|        |        |           | STRESS       |              |               |
|        |        |           | TST,INTERP/R |              |               |
|        |        |           | EPT ONLY     |              |               |
+--------+--------+-----------+--------------+--------------+---------------+
 
 
 
 
 Reason for Visit
 Diagnostic/Screening (Routine)
 
+--------+--------+-----------+--------------+--------------+---------------+
| Status | Reason | Specialty | Diagnoses /  | Referred By  | Referred To   |
|        |        |           | Procedures   | Contact      | Contact       |
+--------+--------+-----------+--------------+--------------+---------------+
| Closed |        | Radiology |   Diagnoses  |   [place], |   [facility] |
 
|        |        |           |  Abnormal    |  MD Paul  |  Medicine     |
|        |        |           | electrocardi |  [address]    | [address]  |
|        |        |           | ogram (ECG)  | [address]   |  [place], |
|        |        |           | (EKG)        | [place], |  [place]           |
|        |        |           | Encounter    |  [place] [address]    | [address]    |
|        |        |           | for other    | Phone:       | Phone:        |
|        |        |           | preprocedura | [phone] | [phone]  |
|        |        |           | l            |   Fax:       |  Fax:         |
|        |        |           | examination  | [phone] | [phone]  |
|        |        |           |  Procedures  |              |               |
|        |        |           |  NM Nuclear  |              |               |
|        |        |           | Stress Test  |              |               |
|        |        |           | (Vasodilator |              |               |
|        |        |           | )  CHG       |              |               |
|        |        |           | MYOCARDIAL   |              |               |
|        |        |           | SPECT        |              |               |
|        |        |           | MULTIPLE     |              |               |
|        |        |           | STUDIES  MI  |              |               |
|        |        |           | CV STRS TST  |              |               |
|        |        |           | XERS&/OR RX  |              |               |
|        |        |           | CONT ECG W/O |              |               |
|        |        |           |  I&R  MI     |              |               |
|        |        |           | CARDIAC      |              |               |
|        |        |           | STRESS       |              |               |
|        |        |           | TST,INTERP/R |              |               |
|        |        |           | EPT ONLY     |              |               |
+--------+--------+-----------+--------------+--------------+---------------+
 
 
 
 
 Encounter Details
 
 
+--------+-----------+----------------------+----------------------+---------------+
| Date   | Type      | Department           | Care Team            | Description   |
+--------+-----------+----------------------+----------------------+---------------+
| / | Hospital  |   [facility] |   Paul Thomson, | Pre-op exam;  |
|    | Encounter |  MED CTR NUCLEAR     |  MD  [address] [facility] | Abnormal EKG  |
|        |           | MEDICINE  [address]      |  [address]  [place],   |               |
|        |           | [address]  [place], | [place] [address]             |               |
|        |           |  [place] [address]       | [phone]         |               |
|        |           | [phone]         | [phone] (Fax)   |               |
+--------+-----------+----------------------+----------------------+---------------+
 
 
 
 Social History
 
 
+---------------+-------+-----------+--------+------+
| Tobacco Use   | Types | Packs/Day | Years  | Date |
|               |       |           | Used   |      |
+---------------+-------+-----------+--------+------+
| Former Smoker |       |           |        |      |
+---------------+-------+-----------+--------+------+
 
 
 
+---------------------+---+---+---+
 
| Smokeless Tobacco:  |   |   |   |
| Never Used          |   |   |   |
+---------------------+---+---+---+
 
 
 
+------------------------+
| Comments: quit in 2000 |
+------------------------+
 
 
 
+-------------+----------------------+---------+----------+
| Alcohol Use | Drinks/Week          | oz/Week | Comments |
+-------------+----------------------+---------+----------+
| No          |   0 Standard drinks  | 0.0     |          |
|             | or equivalent        |         |          |
+-------------+----------------------+---------+----------+
 
 
 
+------------------+---------------+
| Sex Assigned at  | Date Recorded |
| Birth            |               |
+------------------+---------------+
| Not on file      |               |
+------------------+---------------+
 documented as of this encounter
 
 Medications at Time of Discharge
 
 
+---------------------+----------------------+-----------+---------+--------+----------+
| Medication          | Sig                  | Dispensed | Refills | Start  | End Date |
|                     |                      |           |         | Date   |          |
+---------------------+----------------------+-----------+---------+--------+----------+
|   alendronate       | Take 70 mg by mouth  |           | 0       |        |          |
| (FOSAMAX) 70 mg     | Once a week.         |           |         |        |          |
| tablet              |                      |           |         |        |          |
+---------------------+----------------------+-----------+---------+--------+----------+
|   aspirin 81 MG     | Take 81 mg by mouth  |           | 0       |        |          |
| tablet              | Daily.               |           |         |        |          |
+---------------------+----------------------+-----------+---------+--------+----------+
|   Cholecalciferol   | Take 5,000 Units by  |           | 0       |        |          |
| (VITAMIN D-3) 2000  | mouth Daily.         |           |         |        |          |
| units CAPS          |                      |           |         |        |          |
+---------------------+----------------------+-----------+---------+--------+----------+
|   cyanocobalamin    | Take 5,000 mcg by    |           | 0       |        |          |
| (VITAMIN B-12) 500  | mouth Daily.         |           |         |        |          |
| mcg tablet          |                      |           |         |        |          |
+---------------------+----------------------+-----------+---------+--------+----------+
|   ranitidine        | Take 300 mg by mouth |           | 0       |        |          |
| (ZANTAC) 300 MG     |  nightly.            |           |         |        |          |
| capsule             |                      |           |         |        |          |
+---------------------+----------------------+-----------+---------+--------+----------+
 documented as of this encounter
 
 Plan of Treatment
 Not on filedocumented as of this encounter
 
 
 Procedures
 
 
+----------------------+--------+-------------+----------------------+----------------------
+
| Procedure Name       | Priori | Date/Time   | Associated Diagnosis | Comments             
|
|                      | ty     |             |                      |                      
|
+----------------------+--------+-------------+----------------------+----------------------
+
| NM NUCLEAR STRESS    | Routin | 2016  |   Pre-op exam        |   Results for this   
|
| TEST (PHARMACOLOGIC  | e      |  1:27 PM    | Abnormal EKG         | procedure are in the 
|
| - VASODILATOR)       |        | PDT         |                      |  results section.    
|
+----------------------+--------+-------------+----------------------+----------------------
+
 documented in this encounter
 
 Results
 NM Nuclear Stress Test (Vasodilator) (2016  1:27 PM PDT)
 
+----------+
| Specimen |
+----------+
|          |
+----------+
 
 
 
+------------------------------------------------------------------------+-----------------+
| Impressions                                                            | Performed At    |
+------------------------------------------------------------------------+-----------------+
|      1.    Persantine EKG is negative.  2.   Normal   Persantine       |   [facility]    |
| Sestamibi myocardial perfusion study with a normal   left ventricular  | [facility]        |
| size and wall thickness.   Preserved left ventricular   systolic       | [facility]  |
| function.   LVEF by gated SPECT 80 %.              Signed by: Paul   | - IMAGING       |
| MD [facility]     2016, 13:27                                |                 |
+------------------------------------------------------------------------+-----------------+
 
 
 
+------------------------------------------------------------------------+-----------------+
| Narrative                                                              | Performed At    |
+------------------------------------------------------------------------+-----------------+
|                     NUCLEAR MEDICINE STRESS TEST REPORT                |   [facility]    |
| Patient Name: Laura Castelan   Study Date:   2016   Primary   | [facility]        |
| Care Provider: Carmine Gomez MD   MRN:   [id number]   :      | [facility]  |
|   1940 Age:   76 y.o. Gender: female      CLINICAL                 | - IMAGING       |
| HISTORY/DIAGNOSIS:   Chest pain        PERSANTINE SESTAMIBI STRESS     |                 |
| TEST  Indication:   chest pain      Procedure:   In the supine         |                 |
| position, 42.1 mg of   Persantine was infused intravenously   over 4   |                 |
| minutes.   Blood pressure and EKG were monitored every 1 minute.   5   |                 |
|  mL of normal saline was utilized to flush the IV line.   2.5 minutes  |                 |
| later,   10.4 mCi sestamibi intravenous injection.   SPECT myocardial  |                 |
| perfusion   imaging was acquired with wall motion analysis.   Rest     |                 |
| imaging was   performed using 34.1 mCi Sestamibi intravenous           |                 |
| injection.   Repeated SPECT   myocardial perfusion imaging was         |                 |
 
| acquired with wall motion analysis.   At   the end of the procedure,   |                 |
| 75 mg of aminophylline was infused   intravenously.     Hemodynamics:  |                 |
|     Heart rate baseline 80 beats per minute, peak 94 beats per minute. |                 |
|    Blood   pressure baseline 143/72 mmHg, peak 119/65 mmHg.   EKG      |                 |
| baseline underlying   sinus rhythm, complete right bundle branch       |                 |
| block.       Peak unchanged.     Side Effects:   None.     Arrhythmia: |                 |
|    None.     Persantine Sestamibi Myocardial Perfusion Imaging Result: |                 |
|           The Persantine Sestamibi tomographic images, reviewed        |                 |
| without the   attenuation compensation resolution, revealed a normal   |                 |
| myocardial   perfusion pattern as seen in short axis, vertical long    |                 |
| axis, and   horizontal long axis projections. The left ventricular     |                 |
| cavity is normal.   The rest imaging is also normal.                   |                 |
| Gated SPECT reveals a normal left ventricular wall thickness and       |                 |
| motion.     Preserved left ventricular systolic function. LVEF by      |                 |
| gated SPECT is 80 %.                                                   |                 |
+------------------------------------------------------------------------+-----------------+
 
 
 
+----------------------+---------------------+--------------------+----------------+
| Performing           | Address             | City/State/Zipcode | Phone Number   |
| Organization         |                     |                    |                |
+----------------------+---------------------+--------------------+----------------+
|   [facility]     |   [address] |   [place], [place]  |   [phone] |
| [facility]  |                     | [address]              |                |
| - IMAGING            |                     |                    |                |
+----------------------+---------------------+--------------------+----------------+
 documented in this encounter
 
 Visit Diagnoses
 
 
+--------------------------------------------------------------------+
| Diagnosis                                                          |
+--------------------------------------------------------------------+
|   Pre-op exam  Preoperative examination, unspecified               |
+--------------------------------------------------------------------+
|   Abnormal EKG  Nonspecific abnormal electrocardiogram (ECG) (EKG) |
+--------------------------------------------------------------------+
 documented in this encounter
 
 Administered Medications
 
 
+-----------------------------------+--------+----------+-------+------+------+
| Medication Order                  | MAR    | Action   | Dose  | Rate | Site |
|                                   | Action | Date     |       |      |      |
+-----------------------------------+--------+----------+-------+------+------+
|   aminophylline injection 75 mg   | Given  | 20 | 75 mg |      |      |
| 75 mg, Intravenous, ONCE PRN,     |        | 16  9:43 |       |      |      |
| protocol, Starting Wed 16 at |        |  AM PDT  |       |      |      |
|  0855, For 1 dose, Nuclear        |        |          |       |      |      |
| Medicine                          |        |          |       |      |      |
+-----------------------------------+--------+----------+-------+------+------+
 
 
 
+---+---+
|   |   |
+---+---+
 
 
 
 
+-----------------------------------+-------+----------+----------+--------+---+
|   dipyridamole (PERSANTINE) 60mg  | Given | 20 | 10.4938  | 419.8  |   |
| in 40 mL NS syringe  0.142        |       | 16  9:15 | mg/min   | mL/hr  |   |
| mg/kg/min                         |       |  AM PDT  |          |        |   |
|  73.9 kg (419.752 mL/hr, rounded  |       |          |          |        |   |
| to 419.8 mL/hr), Intravenous,     |       |          |          |        |   |
| Administer over 4 Minutes, ONCE,  |       |          |          |        |   |
| 16 at 0915, For 1 dose,  |       |          |          |        |   |
| Nuclear Medicine                  |       |          |          |        |   |
+-----------------------------------+-------+----------+----------+--------+---+
 
 
 
+---+---+
|   |   |
+---+---+
 
 
 
+-----------------------------------+-------+----------+----------+---+---+
|   technetium TC-99M sestamibi     | Given | 20 | 10.4     |   |   |
| (CARDIOLITE) injection 9          |       | 16  8:55 | -millicu |   |   |
| millicurie  9 -millicurie,        |       |  AM PDT  | keeley      |   |   |
| Intravenous, ONCE PRN, Other,     |       |          |          |   |   |
| Starting Wed 16 at 0855, For |       |          |          |   |   |
|  1 dose, Nuclear Medicine         |       |          |          |   |   |
+-----------------------------------+-------+----------+----------+---+---+
 
 
 
+---+---+
|   |   |
+---+---+
 documented in this encounter

## 2020-08-02 NOTE — XMS
Encounter Summary
  Created on: 2020
 
 Flip Virginia Su
 External Reference #: [id number]
 : 42
 Sex: Female
 
 Demographics
 
 
+-----------------------+----------------------+
| Address               | [address]    |
|                       | [place]  [address] |
+-----------------------+----------------------+
| Home Phone            | [phone]      |
+-----------------------+----------------------+
| Preferred Language    | Unknown              |
+-----------------------+----------------------+
| Marital Status        |              |
+-----------------------+----------------------+
| Evangelical Affiliation | Unknown              |
+-----------------------+----------------------+
| Race                  | Unknown              |
+-----------------------+----------------------+
| Ethnic Group          | Unknown              |
+-----------------------+----------------------+
 
 
 Author
 
 
+--------------+--------------------------------------------+
| Author       | [facility]  |
|              | and [facility]                                |
+--------------+--------------------------------------------+
| Organization | [facility]  |
|              | and [facility]                                |
+--------------+--------------------------------------------+
| Address      | Unknown                                    |
+--------------+--------------------------------------------+
| Phone        | Unavailable                                |
+--------------+--------------------------------------------+
 
 
 
 Support
 
 
+---------------+--------------+-------------------+-----------------+
| Name          | Relationship | Address           | Phone           |
+---------------+--------------+-------------------+-----------------+
| Nawaf Carrera | E[employer]         | [id number]  | [phone] |
|               |              | [place]   |                 |
|               |              | [id number]             |                 |
+---------------+--------------+-------------------+-----------------+
 
 
 
 
 Care Team Providers
 
 
+----------------------------+------+-----------------+
| Care Team Member Name      | Role | Phone           |
+----------------------------+------+-----------------+
| Carmine Gomez  | PCP  | [phone] |
| MD                         |      |                 |
+----------------------------+------+-----------------+
 
 
 
 Encounter Details
 
 
+--------+----------+----------------------+----------------------+----------------------+
| Date   | Type     | Department           | Care Team            | Description          |
+--------+----------+----------------------+----------------------+----------------------+
| / | Abstract |   [facility]          |   Paul Thomson, | Bundle branch block, |
| 2016   |          | CARDIOLOGY  [address]    |  MD  [address] |  right (Primary Dx); |
|        |          | [address]  [place], |  [address]  [place],   |  Cystitis, subacute; |
|        |          |  [place] [address]       | [place] [address]             |  Essential           |
|        |          | [phone]         | [phone]         | hypertension,        |
|        |          |                      | [phone] (Fax)   | benign;              |
|        |          |                      |                      | Gastroesophageal     |
|        |          |                      |                      | reflux disease,      |
|        |          |                      |                      | esophagitis presence |
|        |          |                      |                      |  not specified;      |
|        |          |                      |                      | Right                |
|        |          |                      |                      | nephrolithiasis;     |
|        |          |                      |                      | Osteoporosis,        |
|        |          |                      |                      | postmenopausal;      |
|        |          |                      |                      | Vitamin D deficiency |
+--------+----------+----------------------+----------------------+----------------------+
 
 
 
 Social History
 
 
+----------------+-------+-----------+--------+------+
| Tobacco Use    | Types | Packs/Day | Years  | Date |
|                |       |           | Used   |      |
+----------------+-------+-----------+--------+------+
| Never Assessed |       |           |        |      |
+----------------+-------+-----------+--------+------+
 
 
 
+------------------+---------------+
| Sex Assigned at  | Date Recorded |
| Birth            |               |
+------------------+---------------+
| Not on file      |               |
+------------------+---------------+
 documented as of this encounter
 
 Plan of Treatment
 Not on filedocumented as of this encounter
 
 
 Visit Diagnoses
 
 
+-----------------------------------------------------------------------+
| Diagnosis                                                             |
+-----------------------------------------------------------------------+
|   Bundle branch block, right - Primary  Right bundle branch block     |
+-----------------------------------------------------------------------+
|   Cystitis, subacute  Other chronic cystitis                          |
+-----------------------------------------------------------------------+
|   Essential hypertension, benign                                      |
+-----------------------------------------------------------------------+
|   Gastroesophageal reflux disease, esophagitis presence not specified |
+-----------------------------------------------------------------------+
|   Right nephrolithiasis                                               |
+-----------------------------------------------------------------------+
|   Osteoporosis, postmenopausal  Senile osteoporosis                   |
+-----------------------------------------------------------------------+
|   Vitamin D deficiency  Unspecified vitamin D deficiency              |
+-----------------------------------------------------------------------+
 documented in this encounter

## 2020-08-02 NOTE — XMS
Encounter Summary
  Created on: 2020
 
 John Virginia Su
 External Reference #: [id number]
 : 42
 Sex: Female
 
 Demographics
 
 
+-----------------------+----------------------+
| Address               | [address]    |
|                       | [place]  [address] |
+-----------------------+----------------------+
| Home Phone            | [phone]      |
+-----------------------+----------------------+
| Preferred Language    | Unknown              |
+-----------------------+----------------------+
| Marital Status        |              |
+-----------------------+----------------------+
| Moravian Affiliation | Unknown              |
+-----------------------+----------------------+
| Race                  | Unknown              |
+-----------------------+----------------------+
| Ethnic Group          | Unknown              |
+-----------------------+----------------------+
 
 
 Author
 
 
+--------------+--------------------------------------------+
| Author       | [facility]  |
|              | [facility]                                |
+--------------+--------------------------------------------+
| Organization | [facility]  |
|              | [facility]                                |
+--------------+--------------------------------------------+
| Address      | Unknown                                    |
+--------------+--------------------------------------------+
| Phone        | Unavailable                                |
+--------------+--------------------------------------------+
 
 
 
 Support
 
 
+---------------+--------------+-------------------+-----------------+
| Name          | Relationship | Address           | Phone           |
+---------------+--------------+-------------------+-----------------+
| Nawaf Carrera | ECON         | [address]  | [phone] |
|               |              | [place]   |                 |
|               |              | [address]             |                 |
+---------------+--------------+-------------------+-----------------+
 
 
 
 
 Care Team Providers
 
 
+----------------------------+------+-----------------+
| Care Team Member Name      | Role | Phone           |
+----------------------------+------+-----------------+
| Carmine Gomez  | PCP  | [phone] |
| MD                         |      |                 |
+----------------------------+------+-----------------+
 
 
 
 Reason for Referral
 Diagnostic/Screening (Routine)
 
+--------+--------+-----------+--------------+--------------+---------------+
| Status | Reason | Specialty | Diagnoses /  | Referred By  | Referred To   |
|        |        |           | Procedures   | Contact      | Contact       |
+--------+--------+-----------+--------------+--------------+---------------+
| Closed |        | Radiology |   Diagnoses  |   [place], |   [facility]    |
|        |        |           |  Pre-op exam |  MD Cynthia  | [address]  |
|        |        |           |   Abnormal   |  [address]    |  [place], |
|        |        |           | EKG          | [address]   |  [place]           |
|        |        |           | Procedures   | [place], | [address]    |
|        |        |           | ECHO         |  [place] [address]    | Phone:        |
|        |        |           | Complete  [place] | Phone:       | [phone]  |
|        |        |           |  ECHO HEART  | [phone] |  Fax:         |
|        |        |           | XTHORACIC,CO |   Fax:       | [phone]  |
|        |        |           | MPLETE W     | [phone] |               |
|        |        |           | DOPPLER  [place]  |              |               |
|        |        |           | ECHO HEART   |              |               |
|        |        |           | XTHORACIC,CO |              |               |
|        |        |           | MPLETE, W/O  |              |               |
|        |        |           | DOPPLER      |              |               |
+--------+--------+-----------+--------------+--------------+---------------+
 
 
 
 
 Reason for Visit
 Diagnostic/Screening (Routine)
 
+--------+--------+-----------+--------------+--------------+---------------+
| Status | Reason | Specialty | Diagnoses /  | Referred By  | Referred To   |
|        |        |           | Procedures   | Contact      | Contact       |
+--------+--------+-----------+--------------+--------------+---------------+
| Closed |        | Radiology |   Diagnoses  |   [place], |   [facility]    |
|        |        |           |  Pre-op exam |  MD Cynthia  | [address]  |
|        |        |           |   Abnormal   |  [address]    |  [place], |
|        |        |           | EKG          | [address]   |  [place]           |
|        |        |           | Procedures   | [place], | [address]    |
|        |        |           | ECHO         |  [place] [address]    | Phone:        |
|        |        |           | Complete  [place] | Phone:       | [phone]  |
|        |        |           |  ECHO HEART  | [phone] |  Fax:         |
|        |        |           | XTHORACIC,CO |   Fax:       | [phone]  |
|        |        |           | MPLETE W     | [phone] |               |
|        |        |           | DOPPLER  [place]  |              |               |
|        |        |           | ECHO HEART   |              |               |
|        |        |           | XTHORACIC,CO |              |               |
 
|        |        |           | MPLETE, W/O  |              |               |
|        |        |           | DOPPLER      |              |               |
+--------+--------+-----------+--------------+--------------+---------------+
 
 
 
 
 Encounter Details
 
 
+--------+-----------+----------------------+----------------------+---------------+
| Date   | Type      | Department           | Care Team            | Description   |
+--------+-----------+----------------------+----------------------+---------------+
| / | Hospital  |   [facility] |   Cynthia Thomson, | Pre-op exam;  |
| 2016   | Encounter |  [facility]  [address] |  MD  [address] | Abnormal EKG  |
|        |           |  [address]  [place]       |  [address]  [place],   |               |
|        |           | [place] [address] | [place] [address]             |               |
|        |           |   [phone]       | [phone]         |               |
|        |           |                      | [phone] (Fax)   |               |
|        |           |                      | Kayleigh Tompkins,  |               |
|        |           |                      | Technologist         |               |
+--------+-----------+----------------------+----------------------+---------------+
 
 
 
 Social History
 
 
+---------------+-------+-----------+--------+------+
| Tobacco Use   | Types | Packs/Day | Years  | Date |
|               |       |           | Used   |      |
+---------------+-------+-----------+--------+------+
| Former Smoker |       |           |        |      |
+---------------+-------+-----------+--------+------+
 
 
 
+---------------------+---+---+---+
| Smokeless Tobacco:  |   |   |   |
| Never Used          |   |   |   |
+---------------------+---+---+---+
 
 
 
+------------------------+
| Comments: quit in 2000 |
+------------------------+
 
 
 
+-------------+----------------------+---------+----------+
| Alcohol Use | Drinks/Week          | oz/Week | Comments |
+-------------+----------------------+---------+----------+
| No          |   0 Standard drinks  | 0.0     |          |
|             | or equivalent        |         |          |
+-------------+----------------------+---------+----------+
 
 
 
+------------------+---------------+
 
| Sex Assigned at  | Date Recorded |
| Birth            |               |
+------------------+---------------+
| Not on file      |               |
+------------------+---------------+
 documented as of this encounter
 
 Medications at Time of Discharge
 
 
+---------------------+----------------------+-----------+---------+--------+----------+
| Medication          | Sig                  | Dispensed | Refills | Start  | End Date |
|                     |                      |           |         | Date   |          |
+---------------------+----------------------+-----------+---------+--------+----------+
|   alendronate       | Take 70 mg by mouth  |           | 0       |        |          |
| (FOSAMAX) 70 mg     | Once a week.         |           |         |        |          |
| tablet              |                      |           |         |        |          |
+---------------------+----------------------+-----------+---------+--------+----------+
|   aspirin 81 MG     | Take 81 mg by mouth  |           | 0       |        |          |
| tablet              | Daily.               |           |         |        |          |
+---------------------+----------------------+-----------+---------+--------+----------+
|   Cholecalciferol   | Take 5,000 Units by  |           | 0       |        |          |
| (VITAMIN D-3) 2000  | mouth Daily.         |           |         |        |          |
| units CAPS          |                      |           |         |        |          |
+---------------------+----------------------+-----------+---------+--------+----------+
|   cyanocobalamin    | Take 5,000 mcg by    |           | 0       |        |          |
| (VITAMIN B-12) 500  | mouth Daily.         |           |         |        |          |
| mcg tablet          |                      |           |         |        |          |
+---------------------+----------------------+-----------+---------+--------+----------+
|   ranitidine        | Take 300 mg by mouth |           | 0       |        |          |
| (ZANTAC) 300 MG     |  nightly.            |           |         |        |          |
| capsule             |                      |           |         |        |          |
+---------------------+----------------------+-----------+---------+--------+----------+
 documented as of this encounter
 
 Plan of Treatment
 Not on filedocumented as of this encounter
 
 Procedures
 
 
+----------------+--------+-------------+----------------------+----------------------+
| Procedure Name | Priori | Date/Time   | Associated Diagnosis | Comments             |
|                | ty     |             |                      |                      |
+----------------+--------+-------------+----------------------+----------------------+
| ECHO COMPLETE  | Routin | 2016  |   Pre-op exam        |   Results for this   |
|                | e      |  8:50 AM    | Abnormal EKG         | procedure are in the |
|                |        | PDT         |                      |  results section.    |
+----------------+--------+-------------+----------------------+----------------------+
| LVEF VALUE     | Routin | 2016  |                      |   Results for this   |
|                | e      |             |                      | procedure are in the |
|                |        |             |                      |  results section.    |
+----------------+--------+-------------+----------------------+----------------------+
 documented in this encounter
 
 Results
 ECHO Complete (2016  8:50 AM PDT)
 
+----------+
| Specimen |
 
+----------+
|          |
+----------+
 
 
 
+-----------------------------------------------------------------------------+-------------
----+
| Narrative                                                                   | Performed At
    |
+-----------------------------------------------------------------------------+-------------
----+
|   This result has an attachment that is not available.  [facility]       |   [facility]
    |
| Echocardiography Report (TTE)  Demographics  Patient Name    JOHN PORTILLO    
    |
| [place] Room Number                        A  Patient Number               [facility]  |
| [id number]         Date of Study             2016  Visit Number      | - IMAGING   
    |
|     [id number]  Accession         [id number]          Referring            |             
    |
| Physician    ALIX MARIE Number  Date of Birth   1940            |             
    |
|     Sonographer                SENA DONN PATEL                          |             
    |
|                                                                [place]       |             
    |
| [place],                                                                 |             
    |
|                         [place]  Age                  76 year(s)                 |             
    |
| Interpreting               ALIX MARIE                                 |             
    |
|                       Cardiologist               CYNTHIA THOMSON MD       |             
    |
|  Gender             Female                Nurse Procedure Type of           |             
    |
| Study  TTE procedure: ECHO Complete. Procedure dateDate:                    |             
    |
| 2016Start: 08:09 AM Study Location: Echo LabIndications:              |             
    |
| Abnormal .31/R94.31 and preop Exam [id number].Patient            |             
    |
| Status: RoutineHeight: 63 inchesWeight: 163 poundsBSA: 1.77 m^2BMI:         |             
    |
| 28.87 kg/m^2Rhythm: Normal Sinus RhythmHR: 78 bpm ConclusionsSummary1.      |             
    |
|  Normal left ventricular size, wall thickness and motion. Preserved         |             
    |
| leftventricular systolic function. LVEF is 65%.2. Grade 1 left              |             
    |
| ventricular diastolic dysfunction.3. Normal valvular structure.4.           |             
    |
| Normal right-sided pressure.5. Normal IVC with normal respiratory           |             
    |
| collapse.                                                                   |             
    |
| Signature-------------------------------------------------------------      |             
    |
 
| --------------- Electronically signed by CNYTHIA THOMSON                  |             
    |
| MD(Interpreting physician) on 2016 07:47                              |             
    |
| AM--------------------------------------------------------------------      |             
    |
| -------- FindingsMitral ValveStructurally normal mitral valve without       |             
    |
| significant stenosis orregurgitation.Aortic ValveAortic valve is            |             
    |
| trileaflet without significant stenosis or regurgitation.Tricuspid          |             
    |
| ValveA vegetation is noted on the tricuspid valve. Suggestive of            |             
    |
| endocarditis.Pulmonic ValveStructurally normal pulmonic valve without       |             
    |
| significant stenosis orregurgitation.Left AtriumNormal left                 |             
    |
| atrium.Left VentricleLeft ventricle is normal in size and function.         |             
    |
| Ejection fraction isestimated at 65 %.Grade 1 left ventricular              |             
    |
| diastolic dysfunction.Right AtriumNormal right atrium.Right                 |             
    |
| VentricleNormal right ventricular structure and function.Pericardial        |             
    |
| EffusionNo evidence of pericardial effusion. MiscellaneousNormal            |             
    |
| aortic root.The IVC appears normal. Valves  Mitral Valve  Peak E-Wave:      |             
    |
|  0.6 m/s Peak A-Wave: 0.83 m/s  Tissue Doppler  Septal e' Velocity:         |             
    |
| 0.06 m/s Septal E/e' Ratio:9.56  Aortic Valve        Mean Gradient:         |             
    |
| 2.05 mmHg  LVOT  Peak Velocity: 1.07 m/s Structures  Left Atrium  LA        |             
    |
| A/P Dimension: 3.4 cm                            LA Area: 11.03 cm^2        |             
    |
| LA Vol/BSA Index: 11 mL/m^2                        LA Volume: 18.71 ml      |             
    |
|   Left Ventricle  Diastolic Dimension: 4.2 cm             Systolic          |             
    |
| Dimension: 2.77 cm Septum Diastolic: 1 cm PW Diastolic: 1 cm EF             |             
    |
| Calculated: 70%  Miscellaneous  Aorta  Aortic Root: 3.33 cm Ascending       |             
    |
| Aorta: 3.58 cm                                                              |             
    |
|----------------------------------------------------------------------------  |            
     |
| Electronically signed by CYNTHIA THOMSON MD(Interpreting physician) on    |             
    |
| 2016 07:47 AM                                                         |             
    |
|----------------------------------------------------------------------------  |            
     |
|                                                                             |             
    |
|Findings                                                                    |              
   |
 
|Mitral Valve                                                                 |             
    |
|Structurally normal mitral valve without significant stenosis or             |             
    |
|regurgitation.                                                              |              
   |
|Aortic Valve                                                                 |             
    |
|Aortic valve is trileaflet without significant stenosis or regurgitation.    |             
    |
|Tricuspid Valve                                                              |             
    |
|A vegetation is noted on the tricuspid valve. Suggestive of endocarditis.    |             
    |
|Pulmonic Valve                                                               |             
    |
|Structurally normal pulmonic valve without significant stenosis or           |             
    |
|regurgitation.                                                              |              
   |
|Left Atrium                                                                  |             
    |
|Normal left atrium.                                                          |             
    |
|Left Ventricle                                                               |             
    |
|Left ventricle is normal in size and function. Ejection fraction is          |             
    |
|estimated at 65 %.                                                           |             
    |
|Grade 1 left ventricular diastolic dysfunction.                              |             
    |
|Right Atrium                                                                 |             
    |
|Normal right atrium.                                                         |             
    |
|Right Ventricle                                                              |             
    |
|Normal right ventricular structure and function.                             |             
    |
|Pericardial Effusion                                                         |             
    |
|No evidence of pericardial effusion.                                         |             
    |
|                                                                             |             
    |
|Miscellaneous                                                               |              
   |
|Normal aortic root.                                                          |             
    |
|The IVC appears normal.                                                      |             
    |
|                                                                             |             
    |
|Valves                                                                      |              
   |
|                                                                             |             
    |
| Mitral Valve                                                                |             
    |
 
|                                                                             |             
    |
| Peak E-Wave: 0.6 m/s                                                        |             
    |
| Peak A-Wave: 0.83 m/s                                                       |             
    |
|                                                                             |             
    |
| Tissue Doppler                                                              |             
    |
|                                                                             |             
    |
| Septal e' Velocity: 0.06 m/s                                                |             
    |
| Septal E/e' Ratio:9.56                                                      |             
    |
|                                                                             |             
    |
| Aortic Valve                                                                |             
    |
|                                                                             |             
    |
|       Mean Gradient: 2.05 mmHg                                              |             
    |
|                                                                             |             
    |
| LVOT                                                                        |             
    |
|                                                                             |             
    |
| Peak Velocity: 1.07 m/s                                                     |             
    |
|                                                                             |             
    |
|Structures                                                                  |              
   |
|                                                                             |             
    |
| Left Atrium                                                                 |             
    |
|                                                                             |             
    |
| LA A/P Dimension: 3.4 cm                            LA Area: 11.03 cm^2     |             
    |
| LA Vol/BSA Index: 11 mL/m^2                        LA Volume: 18.71 ml      |             
    |
|                                                                             |             
    |
| Left Ventricle                                                              |             
    |
|                                                                             |             
    |
| Diastolic Dimension: 4.2 cm             Systolic Dimension: 2.77 cm         |             
    |
| Septum Diastolic: 1 cm                                                      |             
    |
| PW Diastolic: 1 cm                                                          |             
    |
| EF Calculated: 70%                                                          |             
    |
 
|                                                                             |             
    |
| Miscellaneous                                                               |             
    |
|                                                                             |             
    |
| Aorta                                                                       |             
    |
|                                                                             |             
    |
| Aortic Root: 3.33 cm                                                        |             
    |
| Ascending Aorta: 3.58 cm                                                    |             
    |
|                                                                             |             
    |
+-----------------------------------------------------------------------------+-------------
----+
 
 
 
+------------------------------------------------------------------------------------------+
| Procedure Note                                                                           |
+------------------------------------------------------------------------------------------+
|   Mason, Rad Results In - 2016  7:48 AM PDT  Transthoracic Echocardiography Report   |
| (TTE) Demographics Patient Name   [facility] Room Number                A Patient  |
| Number [id number]      Date of Study         2016 Visit Number   [id number]      |
| Accession      [id number]       Referring Physician   ALIX MARIE Number Date of    |
| Birth  1940       Sonographer           SENA PATEL                        |
|                                 BHANU HAMM,                                       |
|                  US Age            76 year(s)       Interpreting          ALIX      |
| CYNTHIA                                 Cardiologist          CYNTHIA THOMSON MD Gender |
|          Female           NurseProcedureType of Study TTE procedure: ECHO                |
| Complete.Procedure dateDate: 2016Start: 08:09 AMStudy Location: Echo               |
| LabIndications: Abnormal .31/R94.31 and preop Exam [id number].Patient Status: |
|  RoutineHeight: 63 inchesWeight: 163 poundsBSA: 1.77 m^2BMI: 28.87 kg/m^2Rhythm: Normal  |
| Sinus RhythmHR: 78 bpmConclusionsSummary1. Normal left ventricular size, wall thickness  |
| and motion. Preserved leftventricular systolic function. LVEF is 65%.2. Grade 1 left     |
| ventricular diastolic dysfunction.3. Normal valvular structure.4. Normal right-sided     |
| pressure.5. Normal IVC with normal respiratory                                           |
| collapse.Signature---------------------------------------------------------------------- |
| ------ Electronically signed by CYNTHIA THOMSON MD(Interpreting physician) on          |
| 2016 07:47                                                                         |
| AM----------------------------------------------------------------------------FindingsMi |
| tral ValveStructurally normal mitral valve without significant stenosis                  |
| orregurgitation.Aortic ValveAortic valve is trileaflet without significant stenosis or   |
| regurgitation.Tricuspid ValveA vegetation is noted on the tricuspid valve. Suggestive of |
|  endocarditis.Pulmonic ValveStructurally normal pulmonic valve without significant       |
| stenosis orregurgitation.Left AtriumNormal left atrium.Left VentricleLeft ventricle is   |
| normal in size and function. Ejection fraction isestimated at 65 %.Grade 1 left          |
| ventricular diastolic dysfunction.Right AtriumNormal right atrium.Right VentricleNormal  |
| right ventricular structure and function.Pericardial EffusionNo evidence of pericardial  |
| effusion.MiscellaneousNormal aortic root.The IVC appears normal.Valves Mitral Valve Peak |
|  E-Wave: 0.6 m/s Peak A-Wave: 0.83 m/s Tissue Doppler Septal e' Velocity: 0.06 m/s       |
| Septal E/e' Ratio:9.56 Aortic Valve     Mean Gradient: 2.05 mmHg LVOT Peak Velocity:     |
| 1.07 m/sStructures Left Atrium LA A/P Dimension: 3.4 cm                   LA Area: 11.03 |
|  cm^2 LA Vol/BSA Index: 11 mL/m^2                LA Volume: 18.71 ml Left Ventricle      |
| Diastolic Dimension: 4.2 cm         Systolic Dimension: 2.77 cm Septum Diastolic: 1 cm   |
| PW Diastolic: 1 cm EF Calculated: 70% Miscellaneous Aorta Aortic Root: 3.33 cm Ascending |
|  Aorta: 3.58 cm                                                                          |
 
|Rhythm: Normal Sinus RhythmHR: 78 bpm                                                     |
|                                                                                          |
|Conclusions                                                                              |
|Summary                                                                                  |
|1. Normal left ventricular size, wall thickness and motion. Preserved left                |
|ventricular systolic function. LVEF is 65%.                                               |
|2. Grade 1 left ventricular diastolic dysfunction.                                        |
|3. Normal valvular structure.                                                             |
|4. Normal right-sided pressure.                                                           |
|5. Normal IVC with normal respiratory collapse.                                           |
|                                                                                          |
|Signature                                                                                |
|----------------------------------------------------------------------------               
|
| Electronically signed by CYNTHIA THOMSON MD(Interpreting physician) on                 |
| 2016 07:47 AM                                                                      |
|----------------------------------------------------------------------------               
|
|                                                                                          |
|Findings                                                                                 |
|Mitral Valve                                                                              |
|Structurally normal mitral valve without significant stenosis or                          |
|regurgitation.                                                                           |
|Aortic Valve                                                                              |
|Aortic valve is trileaflet without significant stenosis or regurgitation.                 |
|Tricuspid Valve                                                                           |
|A vegetation is noted on the tricuspid valve. Suggestive of endocarditis.                 |
|Pulmonic Valve                                                                            |
|Structurally normal pulmonic valve without significant stenosis or                        |
|regurgitation.                                                                           |
|Left Atrium                                                                               |
|Normal left atrium.                                                                       |
|Left Ventricle                                                                            |
|Left ventricle is normal in size and function. Ejection fraction is                       |
|estimated at 65 %.                                                                        |
|Grade 1 left ventricular diastolic dysfunction.                                           |
|Right Atrium                                                                              |
|Normal right atrium.                                                                      |
|Right Ventricle                                                                           |
|Normal right ventricular structure and function.                                          |
|Pericardial Effusion                                                                      |
|No evidence of pericardial effusion.                                                      |
|                                                                                          |
|Miscellaneous                                                                            |
|Normal aortic root.                                                                       |
|The IVC appears normal.                                                                   |
|                                                                                          |
|Valves                                                                                   |
|                                                                                          |
| Mitral Valve                                                                             |
|                                                                                          |
| Peak E-Wave: 0.6 m/s                                                                     |
| Peak A-Wave: 0.83 m/s                                                                    |
|                                                                                          |
| Tissue Doppler                                                                           |
|                                                                                          |
| Septal e' Velocity: 0.06 m/s                                                             |
| Septal E/e' Ratio:9.56                                                                   |
|                                                                                          |
| Aortic Valve                                                                             |
 
|                                                                                          |
|     Mean Gradient: 2.05 mmHg                                                             |
|                                                                                          |
| LVOT                                                                                     |
|                                                                                          |
| Peak Velocity: 1.07 m/s                                                                  |
|                                                                                          |
|Structures                                                                               |
|                                                                                          |
| Left Atrium                                                                              |
|                                                                                          |
| LA A/P Dimension: 3.4 cm                   LA Area: 11.03 cm^2                           |
| LA Vol/BSA Index: 11 mL/m^2                LA Volume: 18.71 ml                           |
|                                                                                          |
| Left Ventricle                                                                           |
|                                                                                          |
| Diastolic Dimension: 4.2 cm         Systolic Dimension: 2.77 cm                          |
| Septum Diastolic: 1 cm                                                                   |
| PW Diastolic: 1 cm                                                                       |
| EF Calculated: 70%                                                                       |
|                                                                                          |
| Miscellaneous                                                                            |
|                                                                                          |
| Aorta                                                                                    |
|                                                                                          |
| Aortic Root: 3.33 cm                                                                     |
| Ascending Aorta: 3.58 cm                                                                 |
+------------------------------------------------------------------------------------------+
 
 
 
+----------------------+---------------------+--------------------+----------------+
| Performing           | Address             | City/State/Zipcode | Phone Number   |
| Organization         |                     |                    |                |
+----------------------+---------------------+--------------------+----------------+
|   [facility]     |   [address] |   [place], [place]  |   [phone] |
| [facility]  |                     | [id number]              |                |
| - IMAGING            |                     |                    |                |
+----------------------+---------------------+--------------------+----------------+
 LVEF VALUE (2016)
 
+-------------+-------+-----------+------------+--------------+
| Component   | Value | Ref Range | Performed  | Pathologist  |
|             |       |           | At         | Signature    |
+-------------+-------+-----------+------------+--------------+
| LVEF-TTE    | 65    |           |            |              |
| TRANSTHORAC |       |           |            |              |
| IC ECHO     |       |           |            |              |
+-------------+-------+-----------+------------+--------------+
 documented in this encounter
 
 Visit Diagnoses
 
 
+--------------------------------------------------------------------+
| Diagnosis                                                          |
+--------------------------------------------------------------------+
|   Pre-op exam  Preoperative examination, unspecified               |
+--------------------------------------------------------------------+
|   Abnormal EKG  Nonspecific abnormal electrocardiogram (ECG) (EKG) |
 
+--------------------------------------------------------------------+
 documented in this encounter

## 2020-08-02 NOTE — XMS
Encounter Summary
  Created on: 2020
 
 Flip Virginia Su
 External Reference #: [id number]
 : 42
 Sex: Female
 
 Demographics
 
 
+-----------------------+----------------------+
| Address               | [address]    |
|                       | [place]  [address] |
+-----------------------+----------------------+
| Home Phone            | [phone]      |
+-----------------------+----------------------+
| Preferred Language    | Unknown              |
+-----------------------+----------------------+
| Marital Status        |              |
+-----------------------+----------------------+
| Tenriism Affiliation | Unknown              |
+-----------------------+----------------------+
| Race                  | Unknown              |
+-----------------------+----------------------+
| Ethnic Group          | Unknown              |
+-----------------------+----------------------+
 
 
 Author
 
 
+--------------+--------------------------------------------+
| Author       | [facility]  |
|              | and [facility]                                |
+--------------+--------------------------------------------+
| Organization | [facility]  |
|              | and [facility]                                |
+--------------+--------------------------------------------+
| Address      | Unknown                                    |
+--------------+--------------------------------------------+
| Phone        | Unavailable                                |
+--------------+--------------------------------------------+
 
 
 
 Support
 
 
+---------------+--------------+-------------------+-----------------+
| Name          | Relationship | Address           | Phone           |
+---------------+--------------+-------------------+-----------------+
| Nawaf Carrera | ECON         | [address]  | [phone] |
|               |              | [place], [place]   |                 |
|               |              | [address]             |                 |
+---------------+--------------+-------------------+-----------------+
 
 
 
 
 Care Team Providers
 
 
+----------------------------+------+-----------------+
| Care Team Member Name      | Role | Phone           |
+----------------------------+------+-----------------+
| Carmine Gomez  | PCP  | [phone] |
| MD                         |      |                 |
+----------------------------+------+-----------------+
 
 
 
 Reason for Referral
 Diagnostic/Screening (Routine)
 
+--------+--------+-----------+--------------+--------------+---------------+
| Status | Reason | Specialty | Diagnoses /  | Referred By  | Referred To   |
|        |        |           | Procedures   | Contact      | Contact       |
+--------+--------+-----------+--------------+--------------+---------------+
| Closed |        | Radiology |   Diagnoses  |   [place], |   [facility] |
|        |        |           |  Abnormal    |  MD Paul  |  Medicine     |
|        |        |           | electrocardi |  [address]    | [address]  |
|        |        |           | ogram (ECG)  | [address]   |  [place], |
|        |        |           | (EKG)        | [place], |  [place]           |
|        |        |           | Encounter    |  [place] [address]    | [id number]    |
|        |        |           | for other    | Phone:       | Phone:        |
|        |        |           | preprocedura | [phone] | [phone]  |
|        |        |           | l            |   Fax:       |  Fax:         |
|        |        |           | examination  | [phone] | [phone]  |
|        |        |           |  Procedures  |              |               |
|        |        |           |  NM Nuclear  |              |               |
|        |        |           | Stress Test  |              |               |
|        |        |           | (Vasodilator |              |               |
|        |        |           | )  CHG       |              |               |
|        |        |           | MYOCARDIAL   |              |               |
|        |        |           | SPECT        |              |               |
|        |        |           | MULTIPLE     |              |               |
|        |        |           | STUDIES  TX  |              |               |
|        |        |           | CV STRS TST  |              |               |
|        |        |           | XERS&/OR RX  |              |               |
|        |        |           | CONT ECG W/O |              |               |
|        |        |           |  I&R  TX     |              |               |
|        |        |           | CARDIAC      |              |               |
|        |        |           | STRESS       |              |               |
|        |        |           | TST,INTERP/R |              |               |
|        |        |           | EPT ONLY     |              |               |
+--------+--------+-----------+--------------+--------------+---------------+
 
 
 
 
 Reason for Visit
 Diagnostic/Screening (Routine)
 
+--------+--------+-----------+--------------+--------------+---------------+
| Status | Reason | Specialty | Diagnoses /  | Referred By  | Referred To   |
|        |        |           | Procedures   | Contact      | Contact       |
+--------+--------+-----------+--------------+--------------+---------------+
| Closed |        | Radiology |   Diagnoses  |   [place], |   [facility] |
 
|        |        |           |  Abnormal    |  MD Paul  |  Medicine     |
|        |        |           | electrocardi |  [address]    | [address]  |
|        |        |           | ogram (ECG)  | [address]   |  [place], |
|        |        |           | (EKG)        | [place], |  [place]           |
|        |        |           | Encounter    |  [place] [address]    | [id number]    |
|        |        |           | for other    | Phone:       | Phone:        |
|        |        |           | preprocedura | [phone] | [phone]  |
|        |        |           | l            |   Fax:       |  Fax:         |
|        |        |           | examination  | [phone] | [phone]  |
|        |        |           |  Procedures  |              |               |
|        |        |           |  NM Nuclear  |              |               |
|        |        |           | Stress Test  |              |               |
|        |        |           | (Vasodilator |              |               |
|        |        |           | )  CHG       |              |               |
|        |        |           | MYOCARDIAL   |              |               |
|        |        |           | SPECT        |              |               |
|        |        |           | MULTIPLE     |              |               |
|        |        |           | STUDIES  TX  |              |               |
|        |        |           | CV STRS TST  |              |               |
|        |        |           | XERS&/OR RX  |              |               |
|        |        |           | CONT ECG W/O |              |               |
|        |        |           |  I&R  TX     |              |               |
|        |        |           | CARDIAC      |              |               |
|        |        |           | STRESS       |              |               |
|        |        |           | TST,INTERP/R |              |               |
|        |        |           | EPT ONLY     |              |               |
+--------+--------+-----------+--------------+--------------+---------------+
 
 
 
 
 Encounter Details
 
 
+--------+-----------+----------------------+----------------------+---------------+
| Date   | Type      | Department           | Care Team            | Description   |
+--------+-----------+----------------------+----------------------+---------------+
| / | Hospital  |   [facility] |   Paul Thomson, | Pre-op exam;  |
|    | Encounter |  MED [facility] NUCLEAR     |  MD  [address] [facility] | Abnormal EKG  |
|        |           | MEDICINE  [address]      |  [address]  [place],   |               |
|        |           | [place]  [place], | [place] [address]             |               |
|        |           |  [place] [address]       | [phone]         |               |
|        |           | [phone]         | [phone] (Fax)   |               |
+--------+-----------+----------------------+----------------------+---------------+
 
 
 
 Social History
 
 
+---------------+-------+-----------+--------+------+
| Tobacco Use   | Types | Packs/Day | Years  | Date |
|               |       |           | Used   |      |
+---------------+-------+-----------+--------+------+
| Former Smoker |       |           |        |      |
+---------------+-------+-----------+--------+------+
 
 
 
+---------------------+---+---+---+
 
| Smokeless Tobacco:  |   |   |   |
| Never Used          |   |   |   |
+---------------------+---+---+---+
 
 
 
+------------------------+
| Comments: quit in 2000 |
+------------------------+
 
 
 
+-------------+----------------------+---------+----------+
| Alcohol Use | Drinks/Week          | oz/Week | Comments |
+-------------+----------------------+---------+----------+
| No          |   0 Standard drinks  | 0.0     |          |
|             | or equivalent        |         |          |
+-------------+----------------------+---------+----------+
 
 
 
+------------------+---------------+
| Sex Assigned at  | Date Recorded |
| Birth            |               |
+------------------+---------------+
| Not on file      |               |
+------------------+---------------+
 documented as of this encounter
 
 Medications at Time of Discharge
 
 
+---------------------+----------------------+-----------+---------+--------+----------+
| Medication          | Sig                  | Dispensed | Refills | Start  | End Date |
|                     |                      |           |         | Date   |          |
+---------------------+----------------------+-----------+---------+--------+----------+
|   alendronate       | Take 70 mg by mouth  |           | 0       |        |          |
| (FOSAMAX) 70 mg     | Once a week.         |           |         |        |          |
| tablet              |                      |           |         |        |          |
+---------------------+----------------------+-----------+---------+--------+----------+
|   aspirin 81 MG     | Take 81 mg by mouth  |           | 0       |        |          |
| tablet              | Daily.               |           |         |        |          |
+---------------------+----------------------+-----------+---------+--------+----------+
|   Cholecalciferol   | Take 5,000 Units by  |           | 0       |        |          |
| (VITAMIN D-3) 2000  | mouth Daily.         |           |         |        |          |
| units CAPS          |                      |           |         |        |          |
+---------------------+----------------------+-----------+---------+--------+----------+
|   cyanocobalamin    | Take 5,000 mcg by    |           | 0       |        |          |
| (VITAMIN B-12) 500  | mouth Daily.         |           |         |        |          |
| mcg tablet          |                      |           |         |        |          |
+---------------------+----------------------+-----------+---------+--------+----------+
|   ranitidine        | Take 300 mg by mouth |           | 0       |        |          |
| (ZANTAC) 300 MG     |  nightly.            |           |         |        |          |
| capsule             |                      |           |         |        |          |
+---------------------+----------------------+-----------+---------+--------+----------+
 documented as of this encounter
 
 Plan of Treatment
 Not on filedocumented as of this encounter
 
 
 Procedures
 
 
+----------------------+--------+-------------+----------------------+----------------------
+
| Procedure Name       | Priori | Date/Time   | Associated Diagnosis | Comments             
|
|                      | ty     |             |                      |                      
|
+----------------------+--------+-------------+----------------------+----------------------
+
| NM NUCLEAR STRESS    | Routin | 2016  |   Pre-op exam        |   Results for this   
|
| TEST (PHARMACOLOGIC  | e      |  1:27 PM    | Abnormal EKG         | procedure are in the 
|
| - VASODILATOR)       |        | PDT         |                      |  results section.    
|
+----------------------+--------+-------------+----------------------+----------------------
+
 documented in this encounter
 
 Results
 NM Nuclear Stress Test (Vasodilator) (2016  1:27 PM PDT)
 
+----------+
| Specimen |
+----------+
|          |
+----------+
 
 
 
+------------------------------------------------------------------------+-----------------+
| Impressions                                                            | Performed At    |
+------------------------------------------------------------------------+-----------------+
|      1.    Persantine EKG is negative.  2.   Normal   Persantine       |   [facility]    |
| Sestamibi myocardial perfusion study with a normal   left ventricular  | [facility]        |
| size and wall thickness.   Preserved left ventricular   systolic       | [facility]  |
| function.   LVEF by gated SPECT 80 %.              Signed by: Paul   | - IMAGING       |
| MD [facility]     2016, 13:27                                |                 |
+------------------------------------------------------------------------+-----------------+
 
 
 
+------------------------------------------------------------------------+-----------------+
| Narrative                                                              | Performed At    |
+------------------------------------------------------------------------+-----------------+
|                     NUCLEAR MEDICINE STRESS TEST REPORT                |   [facility]    |
| Patient Name: Laura Castelan   Study Date:   2016   Primary   | [facility]        |
| Care Provider: Carmine Gomez MD   MRN:   [id number]   :      | [facility]  |
|   1940 Age:   76 y.o. Gender: female      CLINICAL                 | - IMAGING       |
| HISTORY/DIAGNOSIS:   Chest pain        PERSANTINE SESTAMIBI STRESS     |                 |
| TEST  Indication:   chest pain      Procedure:   In the supine         |                 |
| position, 42.1 mg of   Persantine was infused intravenously   over 4   |                 |
| minutes.   Blood pressure and EKG were monitored every 1 minute.   5   |                 |
|  mL of normal saline was utilized to flush the IV line.   2.5 minutes  |                 |
| later,   10.4 mCi sestamibi intravenous injection.   SPECT myocardial  |                 |
| perfusion   imaging was acquired with wall motion analysis.   Rest     |                 |
| imaging was   performed using 34.1 mCi Sestamibi intravenous           |                 |
| injection.   Repeated SPECT   myocardial perfusion imaging was         |                 |
 
| acquired with wall motion analysis.   At   the end of the procedure,   |                 |
| 75 mg of aminophylline was infused   intravenously.     Hemodynamics:  |                 |
|     Heart rate baseline 80 beats per minute, peak 94 beats per minute. |                 |
|    Blood   pressure baseline 143/72 mmHg, peak 119/65 mmHg.   EKG      |                 |
| baseline underlying   sinus rhythm, complete right bundle branch       |                 |
| block.       Peak unchanged.     Side Effects:   None.     Arrhythmia: |                 |
|    None.     Persantine Sestamibi Myocardial Perfusion Imaging Result: |                 |
|           The Persantine Sestamibi tomographic images, reviewed        |                 |
| without the   attenuation compensation resolution, revealed a normal   |                 |
| myocardial   perfusion pattern as seen in short axis, vertical long    |                 |
| axis, and   horizontal long axis projections. The left ventricular     |                 |
| cavity is normal.   The rest imaging is also normal.                   |                 |
| Gated SPECT reveals a normal left ventricular wall thickness and       |                 |
| motion.     Preserved left ventricular systolic function. LVEF by      |                 |
| gated SPECT is 80 %.                                                   |                 |
+------------------------------------------------------------------------+-----------------+
 
 
 
+----------------------+---------------------+--------------------+----------------+
| Performing           | Address             | City/State/Zipcode | Phone Number   |
| Organization         |                     |                    |                |
+----------------------+---------------------+--------------------+----------------+
|   [facility]     |   [address] |   [place], [place]  |   [phone] |
| [facility]  |                     | [address]              |                |
| - IMAGING            |                     |                    |                |
+----------------------+---------------------+--------------------+----------------+
 documented in this encounter
 
 Visit Diagnoses
 
 
+--------------------------------------------------------------------+
| Diagnosis                                                          |
+--------------------------------------------------------------------+
|   Pre-op exam  Preoperative examination, unspecified               |
+--------------------------------------------------------------------+
|   Abnormal EKG  Nonspecific abnormal electrocardiogram (ECG) (EKG) |
+--------------------------------------------------------------------+
 documented in this encounter
 
 Administered Medications
 
 
+-----------------------------------+--------+----------+-------+------+------+
| Medication Order                  | MAR    | Action   | Dose  | Rate | Site |
|                                   | Action | Date     |       |      |      |
+-----------------------------------+--------+----------+-------+------+------+
|   aminophylline injection 75 mg   | Given  | 20 | 75 mg |      |      |
| 75 mg, Intravenous, ONCE PRN,     |        | 16  9:43 |       |      |      |
| protocol, Starting Wed 16 at |        |  AM PDT  |       |      |      |
|  0855, For 1 dose, Nuclear        |        |          |       |      |      |
| Medicine                          |        |          |       |      |      |
+-----------------------------------+--------+----------+-------+------+------+
 
 
 
+---+---+
|   |   |
+---+---+
 
 
 
 
+-----------------------------------+-------+----------+----------+--------+---+
|   dipyridamole (PERSANTINE) 60mg  | Given | 20 | 10.4938  | 419.8  |   |
| in 40 mL NS syringe  0.142        |       | 16  9:15 | mg/min   | mL/hr  |   |
| mg/kg/min                         |       |  AM PDT  |          |        |   |
|  73.9 kg (419.752 mL/hr, rounded  |       |          |          |        |   |
| to 419.8 mL/hr), Intravenous,     |       |          |          |        |   |
| Administer over 4 Minutes, ONCE,  |       |          |          |        |   |
| 16 at 0915, For 1 dose,  |       |          |          |        |   |
| Nuclear Medicine                  |       |          |          |        |   |
+-----------------------------------+-------+----------+----------+--------+---+
 
 
 
+---+---+
|   |   |
+---+---+
 
 
 
+-----------------------------------+-------+----------+----------+---+---+
|   technetium TC-99M sestamibi     | Given | 20 | 10.4     |   |   |
| (CARDIOLITE) injection 9          |       | 16  8:55 | -millicu |   |   |
| millicurie  9 -millicurie,        |       |  AM PDT  | keeley      |   |   |
| Intravenous, ONCE PRN, Other,     |       |          |          |   |   |
| Starting Wed 16 at 0855, For |       |          |          |   |   |
|  1 dose, Nuclear Medicine         |       |          |          |   |   |
+-----------------------------------+-------+----------+----------+---+---+
 
 
 
+---+---+
|   |   |
+---+---+
 documented in this encounter [facility]

## 2020-08-02 NOTE — XMS
Encounter Summary
  Created on: 2020
 
 Flip Virginia Su
 External Reference #: [id number]
 : 42
 Sex: Female
 
 Demographics
 
 
+-----------------------+----------------------+
| Address               | [address]    |
|                       | [place]  [id number] |
+-----------------------+----------------------+
| Home Phone            | [phone]      |
+-----------------------+----------------------+
| Preferred Language    | Unknown              |
+-----------------------+----------------------+
| Marital Status        |              |
+-----------------------+----------------------+
| Confucianist Affiliation | Unknown              |
+-----------------------+----------------------+
| Race                  | Unknown              |
+-----------------------+----------------------+
| Ethnic Group          | Unknown              |
+-----------------------+----------------------+
 
 
 Author
 
 
+--------------+--------------------------------------------+
| Author       | [facility]  |
|              | and [facility]                                |
+--------------+--------------------------------------------+
| Organization | [facility]  |
|              | and [facility]                                |
+--------------+--------------------------------------------+
| Address      | Unknown                                    |
+--------------+--------------------------------------------+
| Phone        | Unavailable                                |
+--------------+--------------------------------------------+
 
 
 
 Support
 
 
+---------------+--------------+-------------------+-----------------+
| Name          | Relationship | Address           | Phone           |
+---------------+--------------+-------------------+-----------------+
| Nawaf Carrera | [place]         | [address]  | [phone] |
|               |              | [place]   |                 |
|               |              | [id number]             |                 |
+---------------+--------------+-------------------+-----------------+
 
 
 
 
 Care Team Providers
 
 
+----------------------------+------+-----------------+
| Care Team Member Name      | Role | Phone           |
+----------------------------+------+-----------------+
| Carmine Gomez PCP  | [phone] |
| MD                         |      |                 |
+----------------------------+------+-----------------+
 
 
 
 Encounter Details
 
 
+--------+-----------+----------------------+----------------------+--------------------+
| Date   | Type      | Department           | Care Team            | Description        |
+--------+-----------+----------------------+----------------------+--------------------+
| 07/10/ | Hospital  |   [facility] |   JanJoss marvin,  | Calculus of kidney |
| 2017   | Encounter |  [facility] X[facility]  [address] | MD  [address]   |                    |
|        |           |  [address]       | [facility] [address]          |                    |
|        |           | [place] [address] | [place], [place]         |                    |
|        |           |   [phone]       | [address]           |                    |
|        |           |                      | [phone]         |                    |
|        |           |                      | [phone] (Fax)   |                    |
+--------+-----------+----------------------+----------------------+--------------------+
 
 
 
 Social History
 
 
+---------------+-------+-----------+--------+------+
| Tobacco Use   | Types | Packs/Day | Years  | Date |
|               |       |           | Used   |      |
+---------------+-------+-----------+--------+------+
| Former Smoker |       |           |        |      |
+---------------+-------+-----------+--------+------+
 
 
 
+---------------------+---+---+---+
| Smokeless Tobacco:  |   |   |   |
| Never Used          |   |   |   |
+---------------------+---+---+---+
 
 
 
+------------------------+
| Comments: quit in  |
+------------------------+
 
 
 
+-------------+----------------------+---------+----------+
| Alcohol Use | Drinks/Week          | oz/Week | Comments |
+-------------+----------------------+---------+----------+
| No          |   0 Standard drinks  | 0.0     |          |
|             | or equivalent        |         |          |
 
+-------------+----------------------+---------+----------+
 
 
 
+------------------+---------------+
| Sex Assigned at  | Date Recorded |
| Birth            |               |
+------------------+---------------+
| Not on file      |               |
+------------------+---------------+
 documented as of this encounter
 
 Medications at Time of Discharge
 
 
+---------------------+----------------------+-----------+---------+--------+----------+
| Medication          | Sig                  | Dispensed | Refills | Start  | End Date |
|                     |                      |           |         | Date   |          |
+---------------------+----------------------+-----------+---------+--------+----------+
|   alendronate       | Take 70 mg by mouth  |           | 0       |        |          |
| (FOSAMAX) 70 mg     | Once a week.         |           |         |        |          |
| tablet              |                      |           |         |        |          |
+---------------------+----------------------+-----------+---------+--------+----------+
|   aspirin 81 MG     | Take 81 mg by mouth  |           | 0       |        |          |
| tablet              | Daily.               |           |         |        |          |
+---------------------+----------------------+-----------+---------+--------+----------+
|   Cholecalciferol   | Take 5,000 Units by  |           | 0       |        |          |
| (VITAMIN D-3) 2000  | mouth Daily.         |           |         |        |          |
| units CAPS          |                      |           |         |        |          |
+---------------------+----------------------+-----------+---------+--------+----------+
|   cyanocobalamin    | Take 5,000 mcg by    |           | 0       |        |          |
| (VITAMIN B-12) 500  | mouth Daily.         |           |         |        |          |
| mcg tablet          |                      |           |         |        |          |
+---------------------+----------------------+-----------+---------+--------+----------+
|   ranitidine        | Take 300 mg by mouth |           | 0       |        |          |
| (ZANTAC) 300 MG     |  nightly.            |           |         |        |          |
| capsule             |                      |           |         |        |          |
+---------------------+----------------------+-----------+---------+--------+----------+
 documented as of this encounter
 
 Plan of Treatment
 Not on filedocumented as of this encounter
 
 Procedures
 
 
+----------------+--------+-------------+----------------------+----------------------+
| Procedure Name | Priori | Date/Time   | Associated Diagnosis | Comments             |
|                | ty     |             |                      |                      |
+----------------+--------+-------------+----------------------+----------------------+
| XR ABDOMEN AP  | Routin | 07/10/2017  |   Calculus of kidney |   Results for this   |
|                | e      | 10:05 AM    |                      | procedure are in the |
|                |        | PDT         |                      |  results section.    |
+----------------+--------+-------------+----------------------+----------------------+
 documented in this encounter
 
 Results
 XR Abdomen AP (07/10/2017 10:05 AM PDT)
 
+----------+
 
| Specimen |
+----------+
|          |
+----------+
 
 
 
+------------------------------------------------------------------------+---------------+
| Narrative                                                              | Performed At  |
+------------------------------------------------------------------------+---------------+
|   TWO VIEW ABDOMEN 7/10/2017 10:04 AM     CLINICAL HISTORY: Calculus   |   [facility] |
| of kidney     COMPARISON: CT 2016     FINDINGS: Previously     |               |
| noted large right renal pelvic calculus is not visible.   A  small     |               |
| rounded calcific density now projects at the level of the inferior     |               |
| right  renal pole.   Pelvic calcifications likely correspond with      |               |
| vascular calcification  and a potential surgical staple line on        |               |
| previous imaging.   There is a small  volume of stool throughout the   |               |
| left colon without overt evidence of bowel  obstruction.   Mild        |               |
| rightward lumbar curvature is present.   There is generalized          |               |
| osteopenia.     IMPRESSION -     1.    NON-VISUALIZATION OF THE        |               |
| PREVIOUSLY DESCRIBED LARGE RIGHT RENAL PELVIC  CALCULUS, WITH A SMALL  |               |
| CALCULUS NOW SUGGESTED IN THE INFERIOR RIGHT KIDNEY.     2.            |               |
|   OSTEOPENIA AND RIGHTWARD LUMBAR CURVATURE.     Dictated and Signed   |               |
| by: Radu Stock MD    Electronically signed: 7/10/2017 10:46 AM      |               |
+------------------------------------------------------------------------+---------------+
 
 
 
+--------------------------------------------------------------------------------------+
| Procedure Note                                                                       |
+--------------------------------------------------------------------------------------+
|   Mason, Rad Results In - 07/10/2017 10:49 AM PDT  TWO VIEW ABDOMEN 7/10/2017 10:04 AM |
|                                                                                      |
| CLINICAL HISTORY: Calculus of kidney                                                 |
|                                                                                      |
| COMPARISON: CT 2016                                                          |
|                                                                                      |
| FINDINGS: Previously noted large right renal pelvic calculus is not visible.  A      |
| small rounded calcific density now projects at the level of the inferior right       |
| renal pole.  Pelvic calcifications likely correspond with vascular calcification     |
| and a potential surgical staple line on previous imaging.  There is a small          |
| volume of stool throughout the left colon without overt evidence of bowel            |
| obstruction.  Mild rightward lumbar curvature is present.  There is generalized      |
| osteopenia.                                                                          |
|                                                                                      |
| IMPRESSION -                                                                         |
|                                                                                      |
| 1.   NON-VISUALIZATION OF THE PREVIOUSLY DESCRIBED LARGE RIGHT RENAL PELVIC          |
| CALCULUS, WITH A SMALL CALCULUS NOW SUGGESTED IN THE INFERIOR RIGHT KIDNEY.          |
|                                                                                      |
| 2.  OSTEOPENIA AND RIGHTWARD LUMBAR CURVATURE.                                       |
|                                                                                      |
| Dictated and Signed by: Radu Stock MD                                              |
|  Electronically signed: 7/10/2017 10:46 AM                                           |
+--------------------------------------------------------------------------------------+
 
 
 
+---------------+---------+--------------------+--------------+
| Performing    | Address | City/State/Zipcode | Phone Number |
 
| Organization  |         |                    |              |
+---------------+---------+--------------------+--------------+
|   [facility] |         |                    |              |
+---------------+---------+--------------------+--------------+
 documented in this encounter
 
 Visit Diagnoses
 
 
+----------------------+
| Diagnosis            |
+----------------------+
|   Calculus of kidney |
+----------------------+
 documented in this encounter

## 2020-08-02 NOTE — XMS
Encounter Summary
  Created on: 2020
 
 Flip Virginia Su
 External Reference #: [id number]
 : 42
 Sex: Female
 
 Demographics
 
 
+-----------------------+----------------------+
| Address               | [address]    |
|                       | [place]  [address] |
+-----------------------+----------------------+
| Home Phone            | [phone]      |
+-----------------------+----------------------+
| Preferred Language    | Unknown              |
+-----------------------+----------------------+
| Marital Status        |              |
+-----------------------+----------------------+
| [employer] Affiliation | Unknown              |
+-----------------------+----------------------+
| Race                  | Unknown              |
+-----------------------+----------------------+
| Ethnic Group          | Unknown              |
+-----------------------+----------------------+
 
 
 Author
 
 
+--------------+--------------------------------------------+
| Author       | [facility]  |
|              | and [facility]                                |
+--------------+--------------------------------------------+
| Organization | [facility]  |
|              | and [facility]                                |
+--------------+--------------------------------------------+
| Address      | Unknown                                    |
+--------------+--------------------------------------------+
| Phone        | Unavailable                                |
+--------------+--------------------------------------------+
 
 
 
 Support
 
 
+---------------+--------------+-------------------+-----------------+
| Name          | Relationship | Address           | Phone           |
+---------------+--------------+-------------------+-----------------+
| Nawaf Carrera | [place]         | [address]  | [phone] |
|               |              | [place]   |                 |
|               |              | [id number]             |                 |
+---------------+--------------+-------------------+-----------------+
 
 
 
 
 Care Team Providers
 
 
+----------------------------+------+-----------------+
| Care Team Member Name      | Role | Phone           |
+----------------------------+------+-----------------+
| Carmine Gomez PCP  | [phone] |
| MD                         |      |                 |
+----------------------------+------+-----------------+
 
 
 
 Reason for Visit
 
 
+--------------+---------------------------------------------+
| Reason       | Comments                                    |
+--------------+---------------------------------------------+
| Follow-up    |                                             |
+--------------+---------------------------------------------+
| Heart Murmur |                                             |
+--------------+---------------------------------------------+
| Results      | Stress Test and Echocardiogram on 2016 |
+--------------+---------------------------------------------+
 
 
 
 Encounter Details
 
 
+--------+---------+----------------------+----------------------+----------------------+
| Date   | Type    | Department           | Care Team            | Description          |
+--------+---------+----------------------+----------------------+----------------------+
| / | Office  |   [facility]          |   Paul Thomson, | Abnormal EKG         |
| 2016   | Visit   | CARDIOLOGY  [address]    |  MD  [address] [place] | (Primary Dx); Other  |
|        |         | [place]  [place], |  [address]  [place],   | chest pain           |
|        |         |  [place] [address]       | [place] [address]             |                      |
|        |         | [phone]         | [phone]         |                      |
|        |         |                      | [phone] (Fax)   |                      |
+--------+---------+----------------------+----------------------+----------------------+
 
 
 
 Social History
 
 
+---------------+-------+-----------+--------+------+
| Tobacco Use   | Types | Packs/Day | Years  | Date |
|               |       |           | Used   |      |
+---------------+-------+-----------+--------+------+
| Former Smoker |       |           |        |      |
+---------------+-------+-----------+--------+------+
 
 
 
+---------------------+---+---+---+
| Smokeless Tobacco:  |   |   |   |
| Never Used          |   |   |   |
+---------------------+---+---+---+
 
 
 
 
+------------------------+
| Comments: quit in 2000 |
+------------------------+
 
 
 
+-------------+----------------------+---------+----------+
| Alcohol Use | Drinks/Week          | oz/Week | Comments |
+-------------+----------------------+---------+----------+
| No          |   0 Standard drinks  | 0.0     |          |
|             | or equivalent        |         |          |
+-------------+----------------------+---------+----------+
 
 
 
+------------------+---------------+
| Sex Assigned at  | Date Recorded |
| Birth            |               |
+------------------+---------------+
| Not on file      |               |
+------------------+---------------+
 documented as of this encounter
 
 Last Filed Vital Signs
 
 
+-------------------+----------------------+----------------------+-----------+
| Vital Sign        | Reading              | Time Taken           | Comments  |
+-------------------+----------------------+----------------------+-----------+
| Blood Pressure    | 130/80               | 2016  3:40 PM  | right arm |
|                   |                      | PDT                  |           |
+-------------------+----------------------+----------------------+-----------+
| Pulse             | 80                   | 2016  3:40 PM  | regular   |
|                   |                      | PDT                  |           |
+-------------------+----------------------+----------------------+-----------+
| Temperature       | -                    | -                    |           |
+-------------------+----------------------+----------------------+-----------+
| Respiratory Rate  | 16                   | 2016  3:40 PM  |           |
|                   |                      | PDT                  |           |
+-------------------+----------------------+----------------------+-----------+
| Oxygen Saturation | -                    | -                    |           |
+-------------------+----------------------+----------------------+-----------+
| Inhaled Oxygen    | -                    | -                    |           |
| Concentration     |                      |                      |           |
+-------------------+----------------------+----------------------+-----------+
| Weight            | 73.6 kg (162 lb 3.2  | 2016  3:40 PM  |           |
|                   | oz)                  | PDT                  |           |
+-------------------+----------------------+----------------------+-----------+
| Height            | 160 cm (5' 3")       | 2016  3:40 PM  |           |
|                   |                      | PDT                  |           |
+-------------------+----------------------+----------------------+-----------+
| Body Mass Index   | 28.73                | 2016  3:40 PM  |           |
|                   |                      | PDT                  |           |
+-------------------+----------------------+----------------------+-----------+
 documented in this encounter
 
 Progress Notes
 
 Paul Thomson MD - 2016  3:47 PM PDTFormatting of this note might be different f
rom the original.
   
 
 PATIENT NAME:  Laura Castelan  
 : 1940:         AGE: 76 y.o.
  PRIMARY CARE:
 Carmine Gomez MD 
 
 OUTPATIENT FOLLOW UP VISIT
 
 Date of Service: 2016 
 
 HISTORY OF PRESENT ILLNESS:
 Laura Castelan is a 76 y.o. female with a history of osteoporosis, heart murmur, COPD, 
kidney stone, tipped bladder and abnormal EKG.  She is being seen today for preop clearance 
prior to bladder surgery.
 
 She was last seen 2016 at which time she was scheduled for echocardiogram and Persantin
e stress test.  Since that time, patient had been doing pretty well from cardiac standpoint.
  There is no new specific cardiac symptoms.  There is no chest pain or chest discomfort bot
h at rest and on exertion.  Patient denies breathlessness.  There is no palpitation dizzines
s or lightheadedness.  There is no ankle or leg swelling.  Patient can sleep on one pillow a
t night without difficulty breathing.  
 
 MEDICAL, SURGICAL, AND PERSONAL HISTORY
 Past Medical, Surgical, Family, and Social History are reviewed in EPIC.
 
 CURRENT PROBLEMS
 Patient Active Problem List 
 Diagnosis 
   Bundle branch block, right 
   Cystitis, subacute 
   Essential hypertension, benign 
   GERD (gastroesophageal reflux disease) 
   Right nephrolithiasis 
   Osteoporosis, postmenopausal 
   Vitamin D deficiency 
 
 CURRENT MEDICATIONS
 Current Outpatient Prescriptions 
 Medication Sig Dispense Refill 
   alendronate (FOSAMAX) 70 mg tablet Take 70 mg by mouth Once a week.   
   aspirin 81 MG tablet Take 81 mg by mouth Daily.   
   Cholecalciferol (VITAMIN D-3) 2000 units CAPS Take 5,000 Units by mouth Daily.   
   cyanocobalamin (VITAMIN B-12) 500 mcg tablet Take 5,000 mcg by mouth Daily.   
   ranitidine (ZANTAC) 300 MG capsule Take 300 mg by mouth nightly.   
 
 No current facility-administered medications for this visit. 
  
 
 ALLERGIES
 Allergies 
 Allergen Reactions 
   Codeine Other (See Comments) 
   hallucinations 
   Doxycycline Diarrhea and Nausea And Vomiting 
   Penicillins Hives and Rash 
   Sulfa Antibiotics Hives and Rash 
 
 
 ROS
 ROS
 
 OBJECTIVE:  
 
 PHYSICAL EXAM
 /80 mmHg | Pulse 80 | Resp 16 | Ht 1.6 m (5' 3") | Wt 73.573 kg (162 lb 3.2 oz) | BMI
 28.74 kg/m2 
 Physical Exam 
 Constitutional: She appears well-developed and well-nourished. No distress. 
 Elderly female individual, without acute distress, accompanied by her son. 
 Neck: Normal carotid pulses, no hepatojugular reflux and no JVD present. Carotid bruit is n
ot present. 
 Cardiovascular: Normal rate, regular rhythm, S1 normal, S2 normal, intact distal pulses and
 normal pulses.  PMI is not displaced.  Exam reveals no gallop, no S3, no S4 and no friction
 rub.  
 Murmur (grade 1/6 holosystolic murmur along left sternal border.) heard.
 Pulses:
      Carotid pulses are 2+ on the right side, and 2+ on the left side.
      Dorsalis pedis pulses are 2+ on the right side, and 2+ on the left side. 
 Pulmonary/Chest: Effort normal and breath sounds normal. No accessory muscle usage. No resp
iratory distress. She has no wheezes. She has no rhonchi. She has no rales. 
 Minimally decreased basal bilateral lung fields. 
 Abdominal: Normal appearance, normal aorta and bowel sounds are normal. She exhibits no abd
ominal bruit. There is no hepatosplenomegaly. There is no tenderness. 
 Musculoskeletal: She exhibits edema (Trace bilateral ankle swelling.). 
 Neurological: She is alert. Gait normal. 
 Skin: Skin is warm and dry. 
 Psychiatric: She has a normal mood and affect. Her mood appears not anxious. She does not e
xhibit a depressed mood. 
 
 LAB RESULTS reviewed during visit today primarily from [facility]: 
 LIPID
 No results found for: CHOL, TRIG, HDL, LDL, CHOLHDL, LDLEX, HDLEX, TRIGEX, CHOLEX
 CHEMISTRY
 Lab Results 
 Component Value Date 
  GLUEX 111* 2015 
  NAEX 138 2015 
  KEX 3.8 2015 
  CLEX 104 2015 
  CO2EX 25 2015 
  ASTEX 16 2015 
  ALTEX 9 2015 
  EGFREX 76 2015 
  CREEX 0.75 2015 
 
 HEMATOLOGY
 Lab Results 
 Component Value Date 
  WBCEX 13.6* 2015 
  HGBEX 15.3 2015 
  HCTEX 45.6* 2015 
  PLTEX 223 2015 
  
 
 Above data and testing is reviewed this visit; testing below is historical data unless othe
rwise specified. 
 ASSESSMENT:  
 
 
 1.  Right bundle-branch block  , preop clearance prior to bladder surgery
  A. Patient was found to have a complete right bundle branch block on the preop EKG.
  B. Echocadriogram, 2016 shows normal left ventricular size, wall thickness and motion
, preserved left ventricular systolic function, LVEF is 65%, grade 1 left ventricular diasto
lic dysfunction, normal valvular structure, normal right-sided pressure, normal IVC with nor
mal respiratory collapse.
  C. Persantine Sestamibi Stress Test, 2016 shows Persantine EKG is negative, normal  P
ersantine Sestamibi myocardial perfusion study with a normal left ventricular size and wall 
thickness, preserved left ventricular systolic function, LVEF by gated SPECT 80 %.
  D. Today, patient had been doing pretty well from cardiac standpoint.  There is no new spe
cific cardiac symptoms.    
   She is in a class II of [employer] functional class.  There is trace bila
teral ankle pitting edema on physical examination..
 2.  Heart murmur
 3. Kidney stone, tipped bladder  
  A. Patient was recently seen by Dr. Cruz for a kidney stone who planned to perform opera
tion.  EKG was performed and revealed a right bundle branch block.
 4.  History of chronic obstructive pulmonary disease
  A. According to her son, patient has history of COPD documented.
 
 PLAN:  
 
 1.  Patient is now ready to pursue kidney stone and bladder surgery.  There is no need for 
any further cardiac workup.  She should have a low cardiovascular risk.
 2.  Discussed the utilization of checking alpha-1 anti-trypsin level if she has a documente
d COPD.  She will discuss this with her ECP.
 3.  Follow-up as needed.
 
 Electronically signed by: Paul Thomson MD [facility] 2016 
 
 Portions of this chart may have been created with Dragon voice recognition software. Occasi
onal wrong-word or   sound-alike  substitutions may have occurred due to the inherent saucedo
itations of voice recognition software. Please read the chart carefully and recognize, using
 context, where these substitutions have occurred.
 Electronically signed by Paul Thomson MD at 2016  4:27 PM PDTdocumented in this 
encounter
 
 Plan of Treatment
 Not on filedocumented as of this encounter
 
 Visit Diagnoses
 
 
+------------------------------------------------------------------------------+
| Diagnosis                                                                    |
+------------------------------------------------------------------------------+
|   Abnormal EKG - Primary  Nonspecific abnormal electrocardiogram (ECG) (EKG) |
+------------------------------------------------------------------------------+
|   Other chest pain                                                           |
+------------------------------------------------------------------------------+
 documented in this encounter

## 2020-08-02 NOTE — XMS
Clinical Summary
  Created on: 2020
 
 Laura Castelan
 External Reference #: [id number]
 : 42
 Sex: Female
 
 Demographics
 
 
+-----------------------+----------------------+
| Address               | [address]    |
|                       | [place]  [address] |
+-----------------------+----------------------+
| Home Phone            | [phone]      |
+-----------------------+----------------------+
| Preferred Language    | Unknown              |
+-----------------------+----------------------+
| Marital Status        |              |
+-----------------------+----------------------+
| Hindu Affiliation | Unknown              |
+-----------------------+----------------------+
| Race                  | Unknown              |
+-----------------------+----------------------+
| Ethnic Group          | Unknown              |
+-----------------------+----------------------+
 
 
 Author
 
 
+--------------+--------------------------------------------+
| Author       | [facility]  |
|              | and [place]                                |
+--------------+--------------------------------------------+
| Organization | [facility]  |
|              | and [place]                                |
+--------------+--------------------------------------------+
| Address      | Unknown                                    |
+--------------+--------------------------------------------+
| Phone        | Unavailable                                |
+--------------+--------------------------------------------+
 
 
 
 Support
 
 
+---------------+--------------+-------------------+-----------------+
| Name          | Relationship | Address           | Phone           |
+---------------+--------------+-------------------+-----------------+
| Nawaf Carrera | ECON         | [address]  | [phone] |
|               |              | [place]   |                 |
|               |              | [address]             |                 |
+---------------+--------------+-------------------+-----------------+
 
 
 
 
 Care Team Providers
 
 
+----------------------------+------+-----------------+
| Care Team Member Name      | Role | Phone           |
+----------------------------+------+-----------------+
| Carmine Gomez  | PCP  | [phone] |
| MD                         |      |                 |
+----------------------------+------+-----------------+
 
 
 
 Allergies
 
 
+-------------------+----------------------+----------+----------+------------------+
| Active Allergy    | Reactions            | Severity | Noted    | Comments         |
|                   |                      |          | Date     |                  |
+-------------------+----------------------+----------+----------+------------------+
| Codeine           | Other (See Comments) |          | 20 |   hallucinations |
|                   |                      |          | 16       |                  |
+-------------------+----------------------+----------+----------+------------------+
| Doxycycline       | Diarrhea, Nausea And |          | 20 |                  |
|                   |  Vomiting            |          | 16       |                  |
+-------------------+----------------------+----------+----------+------------------+
| Penicillins       | Hives, Rash          | Low      | 20 |                  |
|                   |                      |          | 16       |                  |
+-------------------+----------------------+----------+----------+------------------+
| Sulfa Antibiotics | Hives, Rash          | Low      | 20 |                  |
|                   |                      |          | 16       |                  |
+-------------------+----------------------+----------+----------+------------------+
 
 
 
 Medications
 
 
+---------------------+----------------------+-----------+---------+------+------+-------+
| Medication          | Sig                  | Dispensed | Refills | Star | End  | Statu |
|                     |                      |           |         | t    | Date | s     |
|                     |                      |           |         | Date |      |       |
+---------------------+----------------------+-----------+---------+------+------+-------+
|   ranitidine        | Take 300 mg by mouth |           | 0       |      |      | Activ |
| (ZANTAC) 300 MG     |  nightly.            |           |         |      |      | e     |
| capsule             |                      |           |         |      |      |       |
+---------------------+----------------------+-----------+---------+------+------+-------+
|   aspirin 81 MG     | Take 81 mg by mouth  |           | 0       |      |      | Activ |
| tablet              | Daily.               |           |         |      |      | e     |
+---------------------+----------------------+-----------+---------+------+------+-------+
|   cyanocobalamin    | Take 5,000 mcg by    |           | 0       |      |      | Activ |
| (VITAMIN B-12) 500  | mouth Daily.         |           |         |      |      | e     |
| mcg tablet          |                      |           |         |      |      |       |
+---------------------+----------------------+-----------+---------+------+------+-------+
|   alendronate       | Take 70 mg by mouth  |           | 0       |      |      | Activ |
| (FOSAMAX) 70 mg     | Once a week.         |           |         |      |      | e     |
| tablet              |                      |           |         |      |      |       |
+---------------------+----------------------+-----------+---------+------+------+-------+
|   Cholecalciferol   | Take 5,000 Units by  |           | 0       |      |      | Activ |
| (VITAMIN D-3) 2000  | mouth Daily.         |           |         |      |      | e     |
 
| units CAPS          |                      |           |         |      |      |       |
+---------------------+----------------------+-----------+---------+------+------+-------+
 
 
 
 Active Problems
 
 
+--------------+------------+
| Problem      | Noted Date |
+--------------+------------+
| Abnormal EKG | 2016 |
+--------------+------------+
 
 
 
+------------------------------------------------------------------+
|   Overview:   Echocadriogram, 2016 shows normal left        |
| ventricular size, wall thickness and motion, preserved left      |
| ventricular systolic function, LVEF is 65%, grade 1 left         |
| ventricular diastolic dysfunction, normal valvular structure,    |
| normal right-sided pressure, normal IVC with normal respiratory  |
| collapse.                                                        |
+------------------------------------------------------------------+
 
 
 
+------------------+------------+
| Other chest pain | 2016 |
+------------------+------------+
 
 
 
+------------------------------------------------------------------+
|   Overview:   Persantine Sestamibi Stress Test, 2016 shows  |
| Persantine EKG is negative, normal  Persantine Sestamibi         |
| myocardial perfusion study with a normal left ventricular size   |
| and wall thickness, preserved left ventricular systolic          |
| function,   LVEF by gated SPECT 80 %.                            |
+------------------------------------------------------------------+
 
 
 
+----------------------------------------+---+
| Bundle branch block, right             |   |
+----------------------------------------+---+
| Cystitis, subacute                     |   |
+----------------------------------------+---+
| Essential hypertension, benign         |   |
+----------------------------------------+---+
| GERD (gastroesophageal reflux disease) |   |
+----------------------------------------+---+
| Right nephrolithiasis                  |   |
+----------------------------------------+---+
| Osteoporosis, postmenopausal           |   |
+----------------------------------------+---+
| Vitamin D deficiency                   |   |
+----------------------------------------+---+
 
 
 
 
 Family History
 
 
+-----------------+----------+------+------------------+
| Medical History | Relation | Name | Comments         |
+-----------------+----------+------+------------------+
| Cirrhosis       | Father   |      |                  |
+-----------------+----------+------+------------------+
| Diabetes        | Mother   |      |                  |
+-----------------+----------+------+------------------+
| Heart disease   | Mother   |      | arteriosclerotic |
+-----------------+----------+------+------------------+
 
 
 
+----------+------+----------+----------+
| Relation | Name | Status   | Comments |
+----------+------+----------+----------+
| Brother  |      | Alive    |          |
+----------+------+----------+----------+
| Brother  |      | Alive    |          |
+----------+------+----------+----------+
| Brother  |      |  |          |
+----------+------+----------+----------+
| Brother  |      |  |          |
+----------+------+----------+----------+
| Father   |      |  |          |
|          |      |   (Age   |          |
|          |      | 58)      |          |
+----------+------+----------+----------+
| Mother   |      |  |          |
|          |      |   (Age   |          |
|          |      | 62)      |          |
+----------+------+----------+----------+
| Sister   |      | Alive    |          |
+----------+------+----------+----------+
| Sister   |      | Alive    |          |
+----------+------+----------+----------+
| Sister   |      | Alive    |          |
+----------+------+----------+----------+
| Sister   |      | Alive    |          |
+----------+------+----------+----------+
| Sister   |      |  |          |
+----------+------+----------+----------+
| Sister   |      |  |          |
+----------+------+----------+----------+
| Sister   |      |  |          |
+----------+------+----------+----------+
| Sister   |      |  |          |
+----------+------+----------+----------+
 
 
 
 Social History
 
 
+---------------+-------+-----------+--------+------+
| Tobacco Use   | Types | Packs/Day | Years  | Date |
|               |       |           | Used   |      |
 
+---------------+-------+-----------+--------+------+
| Former Smoker |       |           |        |      |
+---------------+-------+-----------+--------+------+
 
 
 
+---------------------+---+---+---+
| Smokeless Tobacco:  |   |   |   |
| Never Used          |   |   |   |
+---------------------+---+---+---+
 
 
 
+------------------------+
| Comments: quit in  |
+------------------------+
 
 
 
+-------------+----------------------+---------+----------+
| Alcohol Use | Drinks/Week          | oz/Week | Comments |
+-------------+----------------------+---------+----------+
| No          |   0 Standard drinks  | 0.0     |          |
|             | or equivalent        |         |          |
+-------------+----------------------+---------+----------+
 
 
 
+------------------+---------------+
| Sex Assigned at  | Date Recorded |
| Birth            |               |
+------------------+---------------+
| Not on file      |               |
+------------------+---------------+
 
 
 
 Last Filed Vital Signs
 
 
+-------------------+----------------------+----------------------+-----------+
| Vital Sign        | Reading              | Time Taken           | Comments  |
+-------------------+----------------------+----------------------+-----------+
| Blood Pressure    | 130/80               | 2016  3:40 PM  | right arm |
|                   |                      | PDT                  |           |
+-------------------+----------------------+----------------------+-----------+
| Pulse             | 80                   | 2016  3:40 PM  | regular   |
|                   |                      | PDT                  |           |
+-------------------+----------------------+----------------------+-----------+
| Temperature       | -                    | -                    |           |
+-------------------+----------------------+----------------------+-----------+
| Respiratory Rate  | 16                   | 2016  3:40 PM  |           |
|                   |                      | PDT                  |           |
+-------------------+----------------------+----------------------+-----------+
| Oxygen Saturation | -                    | -                    |           |
+-------------------+----------------------+----------------------+-----------+
| Inhaled Oxygen    | -                    | -                    |           |
| Concentration     |                      |                      |           |
+-------------------+----------------------+----------------------+-----------+
| Weight            | 73.6 kg (162 lb 3.2  | 2016  3:40 PM  |           |
 
|                   | oz)                  | PDT                  |           |
+-------------------+----------------------+----------------------+-----------+
| Height            | 160 cm (5' 3")       | 2016  3:40 PM  |           |
|                   |                      | PDT                  |           |
+-------------------+----------------------+----------------------+-----------+
| Body Mass Index   | 28.73                | 2016  3:40 PM  |           |
|                   |                      | PDT                  |           |
+-------------------+----------------------+----------------------+-----------+
 
 
 
 Plan of Treatment
 
 
+----------------------+-----------+-------+----------+
| Health Maintenance   | Due Date  | Last  | Comments |
|                      |           | Done  |          |
+----------------------+-----------+-------+----------+
| Vaccine:             |  |       |          |
| Dtap/Tdap/Td (1 -    | 0         |       |          |
| Tdap)                |           |       |          |
+----------------------+-----------+-------+----------+
| Vaccine: Zoster (1   |  |       |          |
| of 2)                | 1         |       |          |
+----------------------+-----------+-------+----------+
| Breast Cancer        |  |       |          |
| Screening            | 6         |       |          |
+----------------------+-----------+-------+----------+
| Vaccine:             |  |       |          |
| Pneumococcal 65+ (1  | 6         |       |          |
| of 1 - PPSV23)       |           |       |          |
+----------------------+-----------+-------+----------+
| Vaccine: Influenza   |  |       |          |
| (#1)                 | 0         |       |          |
+----------------------+-----------+-------+----------+
 
 
 
 Results
 Not on filefrom Last 3 Months
 
 Insurance
 
 
+----------------------+--------+-------------+--------+-------------+---------+--------+
| Payer                | Benefi | Subscriber  | Effect | Phone       | Address | Type   |
|                      | t Plan | ID          | jul[place]na    |             |         |        |
|                      |  /     |             | Dates  |             |         |        |
|                      | Group  |             |        |             |         |        |
+----------------------+--------+-------------+--------+-------------+---------+--------+
| MEDICARE             | MEDICA | [id number]  | 20 | [phone] |         | Medica |
|                      | RE     |             | 06-Pre | 5           |         | re     |
|                      | PART A |             | sent   |             |         |        |
|                      |  AND B |             |        |             |         |        |
+----------------------+--------+-------------+--------+-------------+---------+--------+
| MEDICARE SUPPLEMENT  | MEDICA | [id number]     | 20 | [phone] |         | Indemn |
| OTHER                | RE     |             | 17-Pre | 0           |         | ity    |
|                      | SUPPLE |             | sent   |             |         |        |
|                      | MENT   |             |        |             |         |        |
|                      | OTHER  |             |        |             |         |        |
 
+----------------------+--------+-------------+--------+-------------+---------+--------+
 
 
 
+-------------------+--------+-------------+--------+-------------+----------------------+
| Guarantor Name    | Accoun | Relation to | Date   | Phone       | Billing Address      |
|                   | t Type |  Patient    | of     |             |                      |
|                   |        |             | Birth  |             |                      |
+-------------------+--------+-------------+--------+-------------+----------------------+
| Castelan,[place]  | Person | Self        | / |             |   [address]  |
| Su            | al/Fam |             | 1940   | [phone] |  [place] [address] |
|                   | igor    |             |        | 1 (Home)    |                      |
+-------------------+--------+-------------+--------+-------------+----------------------+
 
 
 
 Advance Directives
 
 
+-----------+-----------------+----------------+-------------+
| Type      | Date Recorded   | Patient        | Explanation |
|           |                 | Representative |             |
+-----------+-----------------+----------------+-------------+
| Power of  |                 |                |             |
|   |                 |                |             |
+-----------+-----------------+----------------+-------------+
| Advance   | 2016  7:40 |                |             |
| Directive |  AM             |                |             |
+-----------+-----------------+----------------+-------------+

## 2020-08-02 NOTE — XMS
Encounter Summary
  Created on: 2020
 
 Flip [place]
 External Reference #: [id number]
 : 42
 Sex: Female
 
 Demographics
 
 
+-----------------------+----------------------+
| Address               | [address]    |
|                       | [place]  [address] |
+-----------------------+----------------------+
| Home Phone            | [phone]      |
+-----------------------+----------------------+
| Preferred Language    | Unknown              |
+-----------------------+----------------------+
| Marital Status        |              |
+-----------------------+----------------------+
| Jew Affiliation | Unknown              |
+-----------------------+----------------------+
| Race                  | Unknown              |
+-----------------------+----------------------+
| Ethnic Group          | Unknown              |
+-----------------------+----------------------+
 
 
 Author
 
 
+--------------+--------------------------------------------+
| Author       | [facility]  |
|              | and [place]                                |
+--------------+--------------------------------------------+
| Organization | [facility]  |
|              | and [place]                                |
+--------------+--------------------------------------------+
| Address      | Unknown                                    |
+--------------+--------------------------------------------+
| Phone        | Unavailable                                |
+--------------+--------------------------------------------+
 
 
 
 Support
 
 
+---------------+--------------+-------------------+-----------------+
| Name          | Relationship | Address           | Phone           |
+---------------+--------------+-------------------+-----------------+
| Nawaf Carrera | E[place]         | [address]  | [phone] |
|               |              | [place]   |                 |
|               |              | [address]             |                 |
+---------------+--------------+-------------------+-----------------+
 
 
 
 
 Care Team Providers
 
 
+----------------------------+------+-----------------+
| Care Team Member Name      | Role | Phone           |
+----------------------------+------+-----------------+
| Carmine Gomez PCP  | [phone] |
| MD                         |      |                 |
+----------------------------+------+-----------------+
 
 
 
 Reason for Visit
 
 
+-------------+--------+----------+
| Reason      | Onset  | Comments |
|             | Date   |          |
+-------------+--------+----------+
| Appointment | / |          |
|             |    |          |
+-------------+--------+----------+
 
 
 
 Encounter Details
 
 
+--------+-----------+----------------------+----------------------+-------------+
| Date   | Type      | Department           | Care Team            | Description |
+--------+-----------+----------------------+----------------------+-------------+
| / | Telephone |   [facility]          |   Carmine Gomez  | Appointment |
| 2016   |           | CARDIOLOGY  [address]    | MD [address]io  [address] |             |
|        |           | [place], |  [place]         |             |
|        |           |  [place] [address]       | [place]        |             |
|        |           | [phone]         | [address]           |             |
|        |           |                      | [phone]         |             |
|        |           |                      | [phone] (Fax)   |             |
+--------+-----------+----------------------+----------------------+-------------+
 
 
 
 Social History
 
 
+----------------+-------+-----------+--------+------+
| Tobacco Use    | Types | Packs/Day | Years  | Date |
|                |       |           | Used   |      |
+----------------+-------+-----------+--------+------+
| Never Assessed |       |           |        |      |
+----------------+-------+-----------+--------+------+
 
 
 
+------------------+---------------+
| Sex Assigned at  | Date Recorded |
| Birth            |               |
+------------------+---------------+
| Not on file      |               |
 
+------------------+---------------+
 documented as of this encounter
 
 Miscellaneous Notes
 Telephone Encounter - Kelly Cerda - 2016  1:33 PM PDTPatient called back, states
 that her son can bring her on 16 and would like to schedule her appointment.  Appointme
nt was scheduled.Electronically signed by Kelly Cerda at 2016  1:34 PM PDTTelephon
e Encounter - Kelly Cerda - 2016 10:36 AM PDTSarahawna with [facility], Dr. Hitzman's office, called to check status of their referral to Dr. Jefferson.  Advised that
 referral had been received and patient had been contacted, patient declined scheduling appo
intment until she spoke to her son, we are waiting for return call from patient or son.Elect
ronically signed by Kelly Cerda at 2016 10:38 AM PDTTelephone Encounter - Kelly Cerda - 2016  9:09 AM PDTNew Patient appointment ready to be scheduled with Dr. Jamal amin  Called and spoke to patient, offered appointment on 16 at 2:30, check-in time 2:00. 
 Patient declines scheduling until she speaks with her son as he provides transportation.  H
old put on the appointment time, will wait for return call.Electronically signed by Kelly Cerda at 2016  9:11 AM PDTdocumented in this encounter
 
 Plan of Treatment
 Not on filedocumented as of this encounter
 
 Visit Diagnoses
 Not on filedocumented in this encounter

## 2020-08-02 NOTE — XMS
Encounter Summary
  Created on: 2020
 
 Flip Virginia Su
 External Reference #: [id number]
 : 42
 Sex: Female
 
 Demographics
 
 
+-----------------------+----------------------+
| Address               | [address]    |
|                       | [place]  [address] |
+-----------------------+----------------------+
| Home Phone            | [phone]      |
+-----------------------+----------------------+
| Preferred Language    | Unknown              |
+-----------------------+----------------------+
| Marital Status        |              |
+-----------------------+----------------------+
| [employer] Affiliation | Unknown              |
+-----------------------+----------------------+
| Race                  | Unknown              |
+-----------------------+----------------------+
| Ethnic Group          | Unknown              |
+-----------------------+----------------------+
 
 
 Author
 
 
+--------------+--------------------------------------------+
| Author       | [facility]  |
|              | and [facility]                                |
+--------------+--------------------------------------------+
| Organization | [facility]  |
|              | and [facility]                                |
+--------------+--------------------------------------------+
| Address      | Unknown                                    |
+--------------+--------------------------------------------+
| Phone        | Unavailable                                |
+--------------+--------------------------------------------+
 
 
 
 Support
 
 
+---------------+--------------+-------------------+-----------------+
| Name          | Relationship | Address           | Phone           |
+---------------+--------------+-------------------+-----------------+
| Nawaf Carrera | [employer]         | [address]  | [phone] |
|               |              | [place]   |                 |
|               |              | [id number]             |                 |
+---------------+--------------+-------------------+-----------------+
 
 
 
 
 Care Team Providers
 
 
+----------------------------+------+-----------------+
| Care Team Member Name      | Role | Phone           |
+----------------------------+------+-----------------+
| Carmine Gomez PCP  | [phone] |
| MD                         |      |                 |
+----------------------------+------+-----------------+
 
 
 
 Reason for Visit
 
 
+-------------+--------+---------------+
| Reason      | Onset  | Comments      |
|             | Date   |               |
+-------------+--------+---------------+
| Appointment | / | AUGUST RECALL |
|             |    |               |
+-------------+--------+---------------+
 
 
 
 Encounter Details
 
 
+--------+-----------+----------------------+----------------------+----------------------+
| Date   | Type      | Department           | Care Team            | Description          |
+--------+-----------+----------------------+----------------------+----------------------+
| / | Telephone |   [facility]          |   Paul Thomson, | Appointment (AUGUST  |
|    |           | CARDIOLOGY  [address]    |  MD  [address] | RECALL)              |
|        |           | [address]  [place], |  [address]  [place],   |                      |
|        |           |  [place] [address]       | [place] [address]             |                      |
|        |           | [phone]         | [phone]         |                      |
|        |           |                      | [phone] (Fax)   |                      |
+--------+-----------+----------------------+----------------------+----------------------+
 
 
 
 Social History
 
 
+---------------+-------+-----------+--------+------+
| Tobacco Use   | Types | Packs/Day | Years  | Date |
|               |       |           | Used   |      |
+---------------+-------+-----------+--------+------+
| Former Smoker |       |           |        |      |
+---------------+-------+-----------+--------+------+
 
 
 
+---------------------+---+---+---+
| Smokeless Tobacco:  |   |   |   |
| Never Used          |   |   |   |
+---------------------+---+---+---+
 
 
 
 
+------------------------+
| Comments: quit in  |
+------------------------+
 
 
 
+-------------+----------------------+---------+----------+
| Alcohol Use | Drinks/Week          | oz/Week | Comments |
+-------------+----------------------+---------+----------+
| No          |   0 Standard drinks  | 0.0     |          |
|             | or equivalent        |         |          |
+-------------+----------------------+---------+----------+
 
 
 
+------------------+---------------+
| Sex Assigned at  | Date Recorded |
| Birth            |               |
+------------------+---------------+
| Not on file      |               |
+------------------+---------------+
 documented as of this encounter
 
 Miscellaneous Notes
 Telephone Encounter - Mary Collier - 2017  4:28 PM PDTPatient last saw Dr. Jefferson on 
16 and was told to follow up as needed. Called patient to offer a yearly follow up. She 
declined and stated she is not having any issues but would call our office if she needed to.
Electronically signed by Mary Collier at 2017  4:30 PM PDTdocumented in this encounte
r
 
 Plan of Treatment
 Not on filedocumented as of this encounter
 
 Visit Diagnoses
 Not on filedocumented in this encounter

## 2020-08-02 NOTE — XMS
Encounter Summary
  Created on: 2020
 
 Flip Virginia Su
 External Reference #: [id number]
 : 42
 Sex: Female
 
 Demographics
 
 
+-----------------------+----------------------+
| Address               | [address]    |
|                       | [place]  [address] |
+-----------------------+----------------------+
| Home Phone            | [phone]      |
+-----------------------+----------------------+
| Preferred Language    | Unknown              |
+-----------------------+----------------------+
| Marital Status        |              |
+-----------------------+----------------------+
| Hinduism Affiliation | Unknown              |
+-----------------------+----------------------+
| Race                  | Unknown              |
+-----------------------+----------------------+
| Ethnic Group          | Unknown              |
+-----------------------+----------------------+
 
 
 Author
 
 
+--------------+--------------------------------------------+
| Author       | [facility]  |
|              | [facility]                                |
+--------------+--------------------------------------------+
| Organization | [facility]  |
|              | [facility]                                |
+--------------+--------------------------------------------+
| Address      | Unknown                                    |
+--------------+--------------------------------------------+
| Phone        | Unavailable                                |
+--------------+--------------------------------------------+
 
 
 
 Support
 
 
+---------------+--------------+-------------------+-----------------+
| Name          | Relationship | Address           | Phone           |
+---------------+--------------+-------------------+-----------------+
| Nawaf Carrera | ECON         | [address]  | [phone] |
|               |              | [place]   |                 |
|               |              | [address]             |                 |
+---------------+--------------+-------------------+-----------------+
 
 
 
 
 Care Team Providers
 
 
+----------------------------+------+-----------------+
| Care Team Member Name      | Role | Phone           |
+----------------------------+------+-----------------+
| Carmine Gomez  | PCP  | [phone] |
| MD                         |      |                 |
+----------------------------+------+-----------------+
 
 
 
 Reason for Referral
 Diagnostic/Screening (Routine)
 
+--------+--------+-----------+--------------+--------------+--------------+
| Status | Reason | Specialty | Diagnoses /  | Referred By  | Referred To  |
|        |        |           | Procedures   | Contact      | Contact      |
+--------+--------+-----------+--------------+--------------+--------------+
| Closed |        | Radiology |   Diagnoses  |   [facility]    |              |
|        |        |           |  Calculus of | Joss ESPINOZA MD |              |
|        |        |           |  kidney      |   [address]    |              |
|        |        |           | Procedures   | [address]    |              |
|        |        |           | CT Abdomen   | [address]      |              |
|        |        |           | Pelvis wo    | [place], [place] |              |
|        |        |           | Contrast     |  [id number]  |              |
|        |        |           |              |  Phone:      |              |
|        |        |           |              | [phone] |              |
|        |        |           |              |   Fax:       |              |
|        |        |           |              | [phone] |              |
+--------+--------+-----------+--------------+--------------+--------------+
 
 
 
 
 Reason for Visit
 Diagnostic/Screening (Routine)
 
+--------+--------+-----------+--------------+--------------+--------------+
| Status | Reason | Specialty | Diagnoses /  | Referred By  | Referred To  |
|        |        |           | Procedures   | Contact      | Contact      |
+--------+--------+-----------+--------------+--------------+--------------+
| Closed |        | Radiology |   Diagnoses  |   [place],    |              |
|        |        |           |  Calculus of | Joss ESPINOZA MD |              |
|        |        |           |  kidney      |   [address]    |              |
|        |        |           | Procedures   | [address]    |              |
|        |        |           | CT Abdomen   | [address]      |              |
|        |        |           | Pelvis wo    | [place], [place] |              |
|        |        |           | Contrast     |  [address]  |              |
|        |        |           |              |  Phone:      |              |
|        |        |           |              | [phone] |              |
|        |        |           |              |   Fax:       |              |
|        |        |           |              | [phone] |              |
+--------+--------+-----------+--------------+--------------+--------------+
 
 
 
 
 Encounter Details
 
 
 
+--------+-----------+----------------------+----------------------+--------------------+
| Date   | Type      | Department           | Care Team            | Description        |
+--------+-----------+----------------------+----------------------+--------------------+
| 10/11/ | Hospital  |   [facility] |   Joss Schwartz,  | Calculus of kidney |
| 2016   | Encounter |  [facility] [address]   | MD  [address]   |                    |
|        |           | [place], | [address]          |                    |
|        |           |  [place] [address]       | [place], [place]         |                    |
|        |           | [phone]         | [address]           |                    |
|        |           |                      | [phone]         |                    |
|        |           |                      | [phone] (Fax)   |                    |
+--------+-----------+----------------------+----------------------+--------------------+
 
 
 
 Social History
 
 
+---------------+-------+-----------+--------+------+
| Tobacco Use   | Types | Packs/Day | Years  | Date |
|               |       |           | Used   |      |
+---------------+-------+-----------+--------+------+
| Former Smoker |       |           |        |      |
+---------------+-------+-----------+--------+------+
 
 
 
+---------------------+---+---+---+
| Smokeless Tobacco:  |   |   |   |
| Never Used          |   |   |   |
+---------------------+---+---+---+
 
 
 
+------------------------+
| Comments: quit in  |
+------------------------+
 
 
 
+-------------+----------------------+---------+----------+
| Alcohol Use | Drinks/Week          | oz/Week | Comments |
+-------------+----------------------+---------+----------+
| No          |   0 Standard drinks  | 0.0     |          |
|             | or equivalent        |         |          |
+-------------+----------------------+---------+----------+
 
 
 
+------------------+---------------+
| Sex Assigned at  | Date Recorded |
| Birth            |               |
+------------------+---------------+
| Not on file      |               |
+------------------+---------------+
 documented as of this encounter
 
 Medications at Time of Discharge
 
 
 
+---------------------+----------------------+-----------+---------+--------+----------+
| Medication          | Sig                  | Dispensed | Refills | Start  | End Date |
|                     |                      |           |         | Date   |          |
+---------------------+----------------------+-----------+---------+--------+----------+
|   alendronate       | Take 70 mg by mouth  |           | 0       |        |          |
| (FOSAMAX) 70 mg     | Once a week.         |           |         |        |          |
| tablet              |                      |           |         |        |          |
+---------------------+----------------------+-----------+---------+--------+----------+
|   aspirin 81 MG     | Take 81 mg by mouth  |           | 0       |        |          |
| tablet              | Daily.               |           |         |        |          |
+---------------------+----------------------+-----------+---------+--------+----------+
|   Cholecalciferol   | Take 5,000 Units by  |           | 0       |        |          |
| (VITAMIN D-3) 2000  | mouth Daily.         |           |         |        |          |
| units CAPS          |                      |           |         |        |          |
+---------------------+----------------------+-----------+---------+--------+----------+
|   cyanocobalamin    | Take 5,000 mcg by    |           | 0       |        |          |
| (VITAMIN B-12) 500  | mouth Daily.         |           |         |        |          |
| mcg tablet          |                      |           |         |        |          |
+---------------------+----------------------+-----------+---------+--------+----------+
|   ranitidine        | Take 300 mg by mouth |           | 0       |        |          |
| (ZANTAC) 300 MG     |  nightly.            |           |         |        |          |
| capsule             |                      |           |         |        |          |
+---------------------+----------------------+-----------+---------+--------+----------+
 documented as of this encounter
 
 Plan of Treatment
 Not on filedocumented as of this encounter
 
 Procedures
 
 
+----------------------+--------+-------------+----------------------+----------------------
+
| Procedure Name       | Priori | Date/Time   | Associated Diagnosis | Comments             
|
|                      | ty     |             |                      |                      
|
+----------------------+--------+-------------+----------------------+----------------------
+
| CT ABDOMEN PELVIS WO | Routin | 10/11/2016  |   Calculus of kidney |   Results for this   
|
|  CONTRAST            | e      | 11:59 AM    |                      | procedure are in the 
|
|                      |        | PDT         |                      |  results section.    
|
+----------------------+--------+-------------+----------------------+----------------------
+
 documented in this encounter
 
 Results
 CT Abdomen Pelvis wo Contrast (10/11/2016 11:59 AM PDT)
 
+----------+
| Specimen |
+----------+
|          |
+----------+
 
 
 
 
+------------------------------------------------------------------------+---------------+
| Narrative                                                              | Performed At  |
+------------------------------------------------------------------------+---------------+
|   UNENHANCED CT ABDOMEN AND PELVIS 10/11/2016 11:48 AM     CLINICAL    |   [facility] |
| HISTORY: CALCULUS OF KIDNEY     COMPARISON:   None     TECHNIQUE:      |               |
| Axial unenhanced images are performed through the abdomen and pelvis.  |               |
|   Coronal and sagittal reformations are also performed.      FINDINGS: |               |
|    Very large, smoothly marginated high density stone virtually        |               |
| filling the right  renal pelvis. This has cross-sectional measurements |               |
|  1.6 x 2.7 x 1.3 cm with a CT  density of approximately 1000 units.    |               |
| Surprisingly, there is no associated right  hydronephrosis. Kidneys    |               |
| are symmetric in size with normal contours. Low-density  2 cm cyst in  |               |
| the mid cortex of the left kidney. No other focal renal  abnormality.  |               |
| No other stones. Urinary bladder is within normal limits. Prostatic    |               |
| urethra is prominent, without prostatic hypertrophy.     Emphysematous |               |
|  changes in the lung bases with no airspace consolidation or  nodule.  |               |
| No effusion.     Liver shows normal appearance for a noncontrast scan. |               |
|   Gallbladder, pancreas, spleen and adrenal glands are normal.         |               |
| Diffuse almost uniform aortic intimal calcification with no stenotic   |               |
| or  aneurysmal segments. No retroperitoneal adenopathy.     Stomach    |               |
| and small bowel are nondilated. Ileocecal junction is normal. No       |               |
| appendix is seen. Scattered diverticula in the sigmoid region without  |               |
| associated  inflammatory change. No other colonic abnormality.     No  |               |
| mesenteric inflammatory changes or adenopathy. No abdominal or pelvic  |               |
| free  fluid.     Mild facet degenerative changes of the lumbar spine.  |               |
| No other bony or body.     IMPRESSION -   1. Very large, but           |               |
| nonobstructing high density stone in the right renal pelvis  as        |               |
| described above. This stone should be readily visible on plain x-ray.  |               |
| No  other renal stones.     2. Sigmoid diverticulosis, without imaging |               |
|  features of diverticulitis.     3. Pulmonary emphysema.      Dictated |               |
|  and Signed by: Vitor Martinez MD    Electronically signed:           |               |
| 10/11/2016 4:35 PM                                                     |               |
+------------------------------------------------------------------------+---------------+
 
 
 
+------------------------------------------------------------------------------------------+
| Procedure Note                                                                           |
+------------------------------------------------------------------------------------------+
|   [facility], Rad Results In - 10/11/2016  4:38 PM PDT  UNENHANCED CT ABDOMEN AND PELVIS        |
| 10/11/2016 11:48 AM CLINICAL HISTORY: CALCULUS OF KIDNEY COMPARISON:  None TECHNIQUE:    |
| Axial unenhanced images are performed through the abdomen and pelvis. Coronal and        |
| sagittal reformations are also performed.  FINDINGS: Very large, smoothly marginated     |
| high density stone virtually filling the rightrenal pelvis. This has cross-sectional     |
| measurements 1.6 x 2.7 x 1.3 cm with a CTdensity of approximately 1000 units.            |
| Surprisingly, there is no associated righthydronephrosis. Kidneys are symmetric in size  |
| with normal contours. Low-density2 cm cyst in the mid cortex of the left kidney. No      |
| other focal renalabnormality. No other stones. Urinary bladder is within normal limits.  |
| Prostaticurethra is prominent, without prostatic hypertrophy.Emphysematous changes in    |
| the lung bases with no airspace consolidation ornodule. No effusion.Liver shows normal   |
| appearance for a noncontrast scan.Gallbladder, pancreas, spleen and adrenal glands are   |
| normal.Diffuse almost uniform aortic intimal calcification with no stenotic oraneurysmal |
|  segments. No retroperitoneal adenopathy.Stomach and small bowel are nondilated.         |
| Ileocecal junction is normal. Noappendix is seen. Scattered diverticula in the sigmoid   |
| region without associatedinflammatory change. No other colonic abnormality.No mesenteric |
|  inflammatory changes or adenopathy. No abdominal or pelvic freefluid.Mild facet         |
| degenerative changes of the lumbar spine. No other bony or body. IMPRESSION - 1. Very    |
| large, but nonobstructing high density stone in the right renal pelvisas described       |
| above. This stone should be readily visible on plain x-ray. Noother renal stones.2.      |
 
| Sigmoid diverticulosis, without imaging features of diverticulitis.3. Pulmonary          |
| emphysema. Dictated and Signed by: Vitor Martinez MD  Electronically signed: 10/11/2016  |
| 4:35 PM                                                                                  |
|                                                                                          |
|Diffuse almost uniform aortic intimal calcification with no stenotic or                   |
|aneurysmal segments. No retroperitoneal adenopathy.                                       |
|                                                                                          |
|Stomach and small bowel are nondilated. Ileocecal junction is normal. No                  |
|appendix is seen. Scattered diverticula in the sigmoid region without associated          |
|inflammatory change. No other colonic abnormality.                                        |
|                                                                                          |
|No mesenteric inflammatory changes or adenopathy. No abdominal or pelvic free             |
|fluid.                                                                                   |
|                                                                                          |
|Mild facet degenerative changes of the lumbar spine. No other bony or body.               |
|                                                                                          |
|IMPRESSION -                                                                              |
|1. Very large, but nonobstructing high density stone in the right renal pelvis            |
|as described above. This stone should be readily visible on plain x-ray. No               |
|other renal stones.                                                                       |
|                                                                                          |
|2. Sigmoid diverticulosis, without imaging features of diverticulitis.                    |
|                                                                                          |
|3. Pulmonary emphysema.                                                                   |
|                                                                                          |
|Dictated and Signed by: Vitor Martinez MD                                                 |
| Electronically signed: 10/11/2016 4:35 PM                                                |
+------------------------------------------------------------------------------------------+
 
 
 
+---------------+---------+--------------------+--------------+
| Performing    | Address | City/State/Zipcode | Phone Number |
| Organization  |         |                    |              |
+---------------+---------+--------------------+--------------+
|   [facility] |         |                    |              |
+---------------+---------+--------------------+--------------+
 documented in this encounter
 
 Visit Diagnoses
 
 
+----------------------+
| Diagnosis            |
+----------------------+
|   Calculus of kidney |
+----------------------+
 documented in this encounter

## 2020-08-02 NOTE — XMS
Encounter Summary
  Created on: 2020
 
 Flip Virginia Su
 External Reference #: [id number]
 : 42
 Sex: Female
 
 Demographics
 
 
+-----------------------+----------------------+
| Address               | [address]    |
|                       | [place]  [address] |
+-----------------------+----------------------+
| Home Phone            | [phone]      |
+-----------------------+----------------------+
| Preferred Language    | Unknown              |
+-----------------------+----------------------+
| Marital Status        |              |
+-----------------------+----------------------+
| Episcopal Affiliation | Unknown              |
+-----------------------+----------------------+
| Race                  | Unknown              |
+-----------------------+----------------------+
| Ethnic Group          | Unknown              |
+-----------------------+----------------------+
 
 
 Author
 
 
+--------------+--------------------------------------------+
| Author       | [facility]  |
|              | and [facility]                                |
+--------------+--------------------------------------------+
| Organization | [facility]  |
|              | and [facility]                                |
+--------------+--------------------------------------------+
| Address      | Unknown                                    |
+--------------+--------------------------------------------+
| Phone        | Unavailable                                |
+--------------+--------------------------------------------+
 
 
 
 Support
 
 
+---------------+--------------+-------------------+-----------------+
| Name          | Relationship | Address           | Phone           |
+---------------+--------------+-------------------+-----------------+
| Nawaf Carrera | [place]         | [address]  | [phone] |
|               |              | [place]   |                 |
|               |              | [address]             |                 |
+---------------+--------------+-------------------+-----------------+
 
 
 
 
 Care Team Providers
 
 
+----------------------------+------+-----------------+
| Care Team Member Name      | Role | Phone           |
+----------------------------+------+-----------------+
| Carmine Gomez PCP  | [phone] |
| MD                         |      |                 |
+----------------------------+------+-----------------+
 
 
 
 Reason for Visit
 
 
+-------------+--------+----------+
| Reason      | Onset  | Comments |
|             | Date   |          |
+-------------+--------+----------+
| Appointment | / |          |
|             |    |          |
+-------------+--------+----------+
 
 
 
 Encounter Details
 
 
+--------+-----------+----------------------+----------------------+-------------+
| Date   | Type      | Department           | Care Team            | Description |
+--------+-----------+----------------------+----------------------+-------------+
| / | Telephone |   [facility]          |   Carmine Gomez  | Appointment |
| 2016   |           | CARDIOLOGY  [address]    | MD [address]  [address] |             |
|        |           | [place], |  [place]         |             |
|        |           |  [place] [address]       | [place]        |             |
|        |           | [phone]         | [address]           |             |
|        |           |                      | [phone]         |             |
|        |           |                      | [phone] (Fax)   |             |
+--------+-----------+----------------------+----------------------+-------------+
 
 
 
 Social History
 
 
+----------------+-------+-----------+--------+------+
| Tobacco Use    | Types | Packs/Day | Years  | Date |
|                |       |           | Used   |      |
+----------------+-------+-----------+--------+------+
| Never Assessed |       |           |        |      |
+----------------+-------+-----------+--------+------+
 
 
 
+------------------+---------------+
| Sex Assigned at  | Date Recorded |
| Birth            |               |
+------------------+---------------+
| Not on file      |               |
 
+------------------+---------------+
 documented as of this encounter
 
 Miscellaneous Notes
 Telephone Encounter - Kelly Cerda - 2016  1:33 PM PDTPatient called back, states
 that her son can bring her on 16 and would like to schedule her appointment.  Appointme
nt was scheduled.Electronically signed by Kelly Cerda at 2016  1:34 PM PDTTelephon
e Encounter - Kelly Cerda - 2016 10:36 AM PDTSarahawna with [facility], Dr. Hitzman's office, called to check status of their referral to Dr. Jefferson.  Advised that
 referral had been received and patient had been contacted, patient declined scheduling appo
intment until she spoke to her son, we are waiting for return call from patient or son.Elect
ronically signed by Kelly Cerda at 2016 10:38 AM PDTTelephone Encounter - Kelly Cerda - 2016  9:09 AM PDTNew Patient appointment ready to be scheduled with Dr. Jamal amin  Called and spoke to patient, offered appointment on 16 at 2:30, check-in time 2:00. 
 Patient declines scheduling until she speaks with her son as he provides transportation.  H
old put on the appointment time, will wait for return call.Electronically signed by Kelly Cerda at 2016  9:11 AM PDTdocumented in this encounter
 
 Plan of Treatment
 Not on filedocumented as of this encounter
 
 Visit Diagnoses
 Not on filedocumented in this encounter

## 2020-08-02 NOTE — XMS
Encounter Summary
  Created on: 2020
 
 Flip Virginia Su
 External Reference #: [id number]
 : 42
 Sex: Female
 
 Demographics
 
 
+-----------------------+----------------------+
| Address               | [address]    |
|                       | [place]  [address] |
+-----------------------+----------------------+
| Home Phone            | [phone]      |
+-----------------------+----------------------+
| Preferred Language    | Unknown              |
+-----------------------+----------------------+
| Marital Status        |              |
+-----------------------+----------------------+
| [employer] Affiliation | Unknown              |
+-----------------------+----------------------+
| Race                  | Unknown              |
+-----------------------+----------------------+
| Ethnic Group          | Unknown              |
+-----------------------+----------------------+
 
 
 Author
 
 
+--------------+--------------------------------------------+
| Author       | [facility]  |
|              | and [place]                                |
+--------------+--------------------------------------------+
| Organization | [facility]  |
|              | and [place]                                |
+--------------+--------------------------------------------+
| Address      | Unknown                                    |
+--------------+--------------------------------------------+
| Phone        | Unavailable                                |
+--------------+--------------------------------------------+
 
 
 
 Support
 
 
+---------------+--------------+-------------------+-----------------+
| Name          | Relationship | Address           | Phone           |
+---------------+--------------+-------------------+-----------------+
| Nawaf Carrera | [place]         | [address]  | [phone] |
|               |              | [place]   |                 |
|               |              | [address]             |                 |
+---------------+--------------+-------------------+-----------------+
 
 
 
 
 Care Team Providers
 
 
+----------------------------+------+-----------------+
| Care Team Member Name      | Role | Phone           |
+----------------------------+------+-----------------+
| Carmine Gomez PCP  | [phone] |
| MD                         |      |                 |
+----------------------------+------+-----------------+
 
 
 
 Encounter Details
 
 
+--------+-----------+----------------------+----------------------+--------------------+
| Date   | Type      | Department           | Care Team            | Description        |
+--------+-----------+----------------------+----------------------+--------------------+
| 07/10/ | Hospital  |   [facility] |   JanJoss marvin,  | Calculus of kidney |
| 2017   | Encounter |  MED CTR XRAY  [address] | MD  [address]   |                    |
|        |           |  [place]       | [facility] [address]          |                    |
|        |           | [place] [address] | [place], [place]         |                    |
|        |           |   [phone]       | [address]           |                    |
|        |           |                      | [phone]         |                    |
|        |           |                      | [phone] (Fax)   |                    |
+--------+-----------+----------------------+----------------------+--------------------+
 
 
 
 Social History
 
 
+---------------+-------+-----------+--------+------+
| Tobacco Use   | Types | Packs/Day | Years  | Date |
|               |       |           | Used   |      |
+---------------+-------+-----------+--------+------+
| Former Smoker |       |           |        |      |
+---------------+-------+-----------+--------+------+
 
 
 
+---------------------+---+---+---+
| Smokeless Tobacco:  |   |   |   |
| Never Used          |   |   |   |
+---------------------+---+---+---+
 
 
 
+------------------------+
| Comments: quit in  |
+------------------------+
 
 
 
+-------------+----------------------+---------+----------+
| Alcohol Use | Drinks/Week          | oz/Week | Comments |
+-------------+----------------------+---------+----------+
| No          |   0 Standard drinks  | 0.0     |          |
|             | or equivalent        |         |          |
 
+-------------+----------------------+---------+----------+
 
 
 
+------------------+---------------+
| Sex Assigned at  | Date Recorded |
| Birth            |               |
+------------------+---------------+
| Not on file      |               |
+------------------+---------------+
 documented as of this encounter
 
 Medications at Time of Discharge
 
 
+---------------------+----------------------+-----------+---------+--------+----------+
| Medication          | Sig                  | Dispensed | Refills | Start  | End Date |
|                     |                      |           |         | Date   |          |
+---------------------+----------------------+-----------+---------+--------+----------+
|   alendronate       | Take 70 mg by mouth  |           | 0       |        |          |
| (FOSAMAX) 70 mg     | Once a week.         |           |         |        |          |
| tablet              |                      |           |         |        |          |
+---------------------+----------------------+-----------+---------+--------+----------+
|   aspirin 81 MG     | Take 81 mg by mouth  |           | 0       |        |          |
| tablet              | Daily.               |           |         |        |          |
+---------------------+----------------------+-----------+---------+--------+----------+
|   Cholecalciferol   | Take 5,000 Units by  |           | 0       |        |          |
| (VITAMIN D-3) 2000  | mouth Daily.         |           |         |        |          |
| units CAPS          |                      |           |         |        |          |
+---------------------+----------------------+-----------+---------+--------+----------+
|   cyanocobalamin    | Take 5,000 mcg by    |           | 0       |        |          |
| (VITAMIN B-12) 500  | mouth Daily.         |           |         |        |          |
| mcg tablet          |                      |           |         |        |          |
+---------------------+----------------------+-----------+---------+--------+----------+
|   ranitidine        | Take 300 mg by mouth |           | 0       |        |          |
| (ZANTAC) 300 MG     |  nightly.            |           |         |        |          |
| capsule             |                      |           |         |        |          |
+---------------------+----------------------+-----------+---------+--------+----------+
 documented as of this encounter
 
 Plan of Treatment
 Not on filedocumented as of this encounter
 
 Procedures
 
 
+----------------+--------+-------------+----------------------+----------------------+
| Procedure Name | Priori | Date/Time   | Associated Diagnosis | Comments             |
|                | ty     |             |                      |                      |
+----------------+--------+-------------+----------------------+----------------------+
| XR ABDOMEN AP  | Routin | 07/10/2017  |   Calculus of kidney |   Results for this   |
|                | e      | 10:05 AM    |                      | procedure are in the |
|                |        | PDT         |                      |  results section.    |
+----------------+--------+-------------+----------------------+----------------------+
 documented in this encounter
 
 Results
 XR Abdomen AP (07/10/2017 10:05 AM PDT)
 
+----------+
 
| Specimen |
+----------+
|          |
+----------+
 
 
 
+------------------------------------------------------------------------+---------------+
| Narrative                                                              | Performed At  |
+------------------------------------------------------------------------+---------------+
|   TWO VIEW ABDOMEN 7/10/2017 10:04 AM     CLINICAL HISTORY: Calculus   |   [facility] |
| of kidney     COMPARISON: CT 2016     FINDINGS: Previously     |               |
| noted large right renal pelvic calculus is not visible.   A  small     |               |
| rounded calcific density now projects at the level of the inferior     |               |
| right  renal pole.   Pelvic calcifications likely correspond with      |               |
| vascular calcification  and a potential surgical staple line on        |               |
| previous imaging.   There is a small  volume of stool throughout the   |               |
| left colon without overt evidence of bowel  obstruction.   Mild        |               |
| rightward lumbar curvature is present.   There is generalized          |               |
| osteopenia.     IMPRESSION -     1.    NON-VISUALIZATION OF THE        |               |
| PREVIOUSLY DESCRIBED LARGE RIGHT RENAL PELVIC  CALCULUS, WITH A SMALL  |               |
| CALCULUS NOW SUGGESTED IN THE INFERIOR RIGHT KIDNEY.     2.            |               |
|   OSTEOPENIA AND RIGHTWARD LUMBAR CURVATURE.     Dictated and Signed   |               |
| by: Radu Stock MD    Electronically signed: 7/10/2017 10:46 AM      |               |
+------------------------------------------------------------------------+---------------+
 
 
 
+--------------------------------------------------------------------------------------+
| Procedure Note                                                                       |
+--------------------------------------------------------------------------------------+
|   [facility], Rad Results In - 07/10/2017 10:49 AM PDT  TWO VIEW ABDOMEN 7/10/2017 10:04 AM |
|                                                                                      |
| CLINICAL HISTORY: Calculus of kidney                                                 |
|                                                                                      |
| COMPARISON: CT 2016                                                          |
|                                                                                      |
| FINDINGS: Previously noted large right renal pelvic calculus is not visible.  A      |
| small rounded calcific density now projects at the level of the inferior right       |
| renal pole.  Pelvic calcifications likely correspond with vascular calcification     |
| and a potential surgical staple line on previous imaging.  There is a small          |
| volume of stool throughout the left colon without overt evidence of bowel            |
| obstruction.  Mild rightward lumbar curvature is present.  There is generalized      |
| osteopenia.                                                                          |
|                                                                                      |
| IMPRESSION -                                                                         |
|                                                                                      |
| 1.   NON-VISUALIZATION OF THE PREVIOUSLY DESCRIBED LARGE RIGHT RENAL PELVIC          |
| CALCULUS, WITH A SMALL CALCULUS NOW SUGGESTED IN THE INFERIOR RIGHT KIDNEY.          |
|                                                                                      |
| 2.  OSTEOPENIA AND RIGHTWARD LUMBAR CURVATURE.                                       |
|                                                                                      |
| Dictated and Signed by: Radu Stock MD                                              |
|  Electronically signed: 7/10/2017 10:46 AM                                           |
+--------------------------------------------------------------------------------------+
 
 
 
+---------------+---------+--------------------+--------------+
| Performing    | Address | City/State/Zipcode | Phone Number |
 
| Organization  |         |                    |              |
+---------------+---------+--------------------+--------------+
|   [facility] |         |                    |              |
+---------------+---------+--------------------+--------------+
 documented in this encounter
 
 Visit Diagnoses
 
 
+----------------------+
| Diagnosis            |
+----------------------+
|   Calculus of kidney |
+----------------------+
 documented in this encounter

## 2020-08-03 NOTE — NUR
PT RESTING WITH EYES CLOSED UPON ENTERING OPENED EYES. APPEARS DROWSY. STATES
THAT SHE IS DOING OK, DENIES PAIN OR NAUSEA.

## 2020-08-03 NOTE — NUR
PATIENT IS IN BED RESTING, FAMILY IS IN THE ROOM FROM [place], THIS CNA TOLD
FAMILY TO CALL IF THE NEEDED ANYTHING, THE PATIENT IS CURRENTLY NPO

## 2020-08-03 NOTE — NUR
VS AND I&O COMPLETE. PRN ADMINISTERED FOR RIGHT LEG PAIN. PT REPOSITIONED IN
BED WITH 2PA. FAMILY IN ROOM. MYRTLE PATENT. IVF INFUSING. PT REMAINS NPO.

## 2020-08-03 NOTE — NUR
PT SITTING UPRIGHT IN BED TALKING TO FAMILY. BOTH SON AND DAUGHTER IN [place] ARE
IN ROOM. PT DENIES HAVING MUCH PAIN, RATES 5/10 THOUGH. STATES IF SHE DOESNT
MOVE IT IT IS OK. PULSES GOOD. VS STABLE.

## 2020-08-03 NOTE — NUR
PT TO ROOM 108 VIA STRETCHER. ORDERS RECEIVED. PT ALERT AND ORIENTED.
TRANSFERRED WITH 4PA FROM [facility] TO BED. PT YA WELL. RIGHT LEG SHORTENED AND
ROTATED OUTWARDS. PT WITH BASELINE NEUROPATHY BLE.  IRAHETA PATENT DRAINING
YELLOW URINE. IVF INFUSING. PT REPORTS PAIN AND NAUSEA ARE TOLERABLE AT THIS
TIME. O2 2L/NC IN PLACE. ORIENTATION TO ROOM AND NURSE CALL LIGHT PROVIDED.
WARM BLANKETS PROVIDED. WHITE BOARD UPDATED. PT DENIES QUESTIONS OR CONCERNS.
CALL LIGHT IN REACH.

## 2020-08-03 NOTE — NUR
08/03/20 1746 [address] 1709 PT ARRIVED IN PACU WIDE AWAKE AND SHIVERING. WARM BLANKETS
AND SYED PAW PLACED ON PT. 1720 ICE PLACED ON R HIP PER DR ORDERS.
BILAT JOHN ALBA PUT ON. BP CUFF MOVED TO L CALF. 1730 OXYGEN MASK
REMOVED. O2 AT 4L VIA NC PLACED WITH SATS 100%. 1744 TORADOL 15MG
GIVEN IVP PER DR ORDERS.

## 2020-08-03 NOTE — NUR
PT OFF FLOOR WITH VIRGILIO PRUETT AND SON LETA. PT FAIRLY ANXIOUS AND WORRIED
ABOUT WHAT THEY "GIVE TO YOU" IN SURGERY. DOESN'T WANT ANY EXTRA MEDICATIONS.

## 2020-08-03 NOTE — NUR
REPORT RECEIVED FROM DAY SHIFT RN. PT LYING IN BED, ALERT AND ORIENTED. DROWSY
BUT AWAKENS EASILY. POST-OP VS DONE. IVF INFUSING. SCD'S/TEDS/ICE IN PLACE.
DRESSING TO RIGHT HIP CDI. CMS INTACT. PT DENIES NEEDING PRN FOR PAIN. WARM
BLANKET PROVIDED. SIPS OF WATER GIVEN. FAMILY IN ROOM. WHITE BOARD UPDATED.
CALL LIGHT IN REACH.

## 2020-08-03 NOTE — NUR
PT BACK TO FLOOR AT 1805 WITH VIRGILIO MCMAHAN. PT DROWSY BUT EASILY AROUSABLE. ABLE
TO FEEL DOWN TO ANKLE. VS STABLE. HOOKED BACK TO IV FOR TRANS ACID. PULSE OX.
SCD'S. ICE TO SITE. DRESSING CDI. GIVEN ICE WATER, IMMEDIETLY GOT NAUSOUS
AFTER SMALL SIPS. GIVEN ZOFRAN. IRAHETA LIGHT YELLOW. FAMILY IN ROOM. PLACED
MORE WARM BLANKETS.

## 2020-08-03 NOTE — NUR
ANSWERED FAMILY AND PT QUESTIONS REGARDING SURGERY AND AFTER CARE. PT SEEMS
AGREEABLE TO AFTERCARE AT [facility].

## 2020-08-04 PROCEDURE — 0QS604Z REPOSITION RIGHT UPPER FEMUR WITH INTERNAL FIXATION DEVICE, OPEN APPROACH: ICD-10-PCS | Performed by: SPECIALIST

## 2020-08-04 NOTE — NUR
De La Vega catheter removed per doc order. Catheter tip intact upon removal,
patient tolerated well. Call light within reach. Underwear and pad placed at
this time per pt request.

## 2020-08-04 NOTE — NUR
CALL LIGHT ANSWERED. PATIENT SITTING UP IN CHAIR. SON AND RN IN ROOM. PATIENT
USES THE BASE COMMODE. TWO PERSON ASSISTING WITH WALKER AND GAIT BELT. PATIENT
BACKS TO BED. VITAL SIGNS AND I&O DONE. WARM BLANKET PROVIDED. PATIENT DID NOT
VOID DURING THIS PERIOD. RN NOTIFIED. CALL LIGHT WITHIN REACH. NO OTHER NEEDS
AT THIS TIME

## 2020-08-04 NOTE — NUR
EVENING ASSESSMENT COMPLETE. SCHEDULED MEDS ADMINISTERED PER EMAR WITH
APPLESAUCE. PT DENIES PAIN AND NAUSEA. RIGHT HIP DRESSING WITH SMALL AMOUNT OF
OLD DRAINAGE. CMS INTACT. SCD'S/TEDS IN PLACE. PT REFUSES HEEL PROTECTORS.
REPOSITIONED IN BED. SON IN ROOM. CALL LIGHT IN REACH.

## 2020-08-04 NOTE — NUR
PATIENT SITTING UP IN BED. FAMILY IN ROOM. VITAL SIGNS AND I&O DONE CALL LIGHT
WITHIN REACH. NO OTHER NEEDS AT THIS TIME

## 2020-08-04 NOTE — EKG
[facility]
                                    [address]
                                  [place]  [address]
_________________________________________________________________________________________
                                                                 Signed   
 
 
Normal sinus rhythm
Right bundle branch block
Left anterior fascicular block
*** Bifascicular block ***
Abnormal ECG
No previous ECGs available
Confirmed by NONI SHABAZZ MD ([id number]) on 8/4/2020 6:48:57 AM
 
 
 
 
 
 
 
 
 
 
 
 
 
 
 
 
 
 
 
 
 
 
 
 
 
 
 
 
 
 
 
 
 
 
 
 
 
 
    Electronically Signed By: NONI SHABAZZ MD  08/04/20 0649
_________________________________________________________________________________________
PATIENT NAME:     ONEIL LOPEZ               
MEDICAL RECORD #: [id number]                     Electrocardiogram             
          ACCT #: [id number]  
DATE OF BIRTH:   [birth year]                                       
PHYSICIAN:   NONI SHABAZZ MD                   REPORT #: [id number]
REPORT IS CONFIDENTIAL AND NOT TO BE RELEASED WITHOUT AUTHORIZATION

## 2020-08-04 NOTE — NUR
CALLED DR CAPPS REGARDING PAIN AND U\O. HE ORDERED A 500ML BOLUS AND CHANGED
THE PAIN MEDS TO OXY 5.

## 2020-08-04 NOTE — NUR
Pt assisted to bedside commode then to chair; two person heavy assist with
walker and gait belt. Right hip dressing is CDI, cap refill less than three
seconds, cms intact. Pt has intermittent nausea, recently given zofran for
this. Family member at bedside. Pt has no needs at this time. Call light
within reach.

## 2020-08-04 NOTE — NUR
Spoke with pt and son.  Pt orginially stating she will go home, after
discussion pt agrees to go to [facility].  Informed by son he spoke with them
yesterday.  Chart faxed to [facility] and called, they will let me know
tomorrow if they have rooms.

## 2020-08-04 NOTE — OR
[facility]
                                    [address], [place]  [address]
_________________________________________________________________________________________
                                                                 Signed   
 
 
DATE OF OPERATION:
08/03/2020
 
SURGEON:
Derick Mccord MD
 
PREOPERATIVE DIAGNOSIS:
Displaced right intertrochanteric hip fracture.
 
POSTOPERATIVE DIAGNOSIS:
Displaced right intertrochanteric hip fracture.
 
PROCEDURE PERFORMED:
Open reduction and internal fixation of right hip.
 
ASSISTANT:
Clinical PAS.
 
ANESTHESIA:
Spinal.
 
ESTIMATED BLOOD LOSS:
150 mL.
 
IMPLANTS:
12 x 130 Synthes TFN, 105 mm lag screw, and 36 mm distal locking screw.
 
BRIEF HISTORY:
Virginia is a 80-year-old female, who suffered a ground level fall last night.  She
fractured hip, was transported to the emergency department, where radiographs revealed
the intertrochanteric hip fracture.  She was admitted to the hospital overnight for pain
control with the plan to bring her to the operating room today.  Risks, benefits, and
alternatives of surgery were discussed with her and her son, and they elected to
proceed. 
 
DESCRIPTION OF PROCEDURE:
Once consent was obtained, she was taken to the operating room.  After adequate
anesthesia, she was placed on the fracture table.  The left leg was flexed, abducted,
and externally rotated on a well-padded leg smith.  The leg was placed in a foot
traction and Leadbetter maneuver was performed.  The fracture was reasonably well
reduced with just a little offset at the calcar.  The 2 incisions with the TFN were then
taken through skin and subcutaneous tissue.  The greater trochanter was approached first
 
    Electronically Signed By: DERICK MCCORD MD  08/04/20 0706
_________________________________________________________________________________________
PATIENT NAME:     ONEIL LOPEZ               
MEDICAL RECORD #: [id number]            OPERATIVE REPORT              
          ACCT #: [id number]  
DATE OF BIRTH:   [birth year]            REPORT #: [id number]      
PHYSICIAN:        DERICK MCCORD MD              
PCP:              RUFUS WESLEY MD    
REPORT IS CONFIDENTIAL AND NOT TO BE RELEASED WITHOUT AUTHORIZATION
 
 
                                  [facility]
                                    [address], [place]  [address]
_________________________________________________________________________________________
                                                                 Signed   
 
 
and a clamp was placed around the greater trochanter due to a sagittal split.  Once this
was accomplished, the guidepin was advanced from the tip of the trochanter into the body
of the femur.  This was then over-reamed using the large acorn reamer.  Once this was
accomplished, the guidepin was exchanged for guide chivo and this was extended down the
femur.  This was then over-reamed up to 13 mm.  This would accept a 12 mm chivo.  In the
meantime, we placed a bone hook around the calcar from the lower incision and reduced
the calcar back to its natural position.  Once this was accomplished, the TFN was placed
over the guide chivo and advanced until it was well-seated.  The calcar was held reduced
during this portion of procedure.  We then placed the guide for the lag screw and put a
guidepin through this up into the center-center position of the femoral head.  Once this
was accomplished, we released the calcar and removed the clamp from the greater
trochanter.  The guidepin for the lag screw was measured to 105, was overdrilled with
same depth.  Once this was accomplished, we then placed 105 over the guidepin and
advanced until it was well-seated.  The top locking bolt was then brought down from the
center of the chivo onto the locking bolt.  Correction on the lag screw.  The guidepin and
insertion assembly were removed and the guide for the distal locking screw was placed.
This was then drilled and a 36 mm screw was placed.  Once this was accomplished, the
insertion guide was removed.  Final radiographs showed good reduction, good placement of
the chivo and the lag screw.  Wounds were copiously irrigated with antibiotic solution,
closed with #1 Stratafix, followed by 2-0 Stratafix in the subcutaneous tissue and
staples for the skin.  The wound was dressed with an Aquacel dressing.  She was awakened
and taken to the recovery room in satisfactory condition.  All sponge, needle, and
instrument counts were correct. 
 
 
 
            ________________________________________
            Derick Mccord MD 
 
 
BA/MODL
Job #:  [id number]
DD:  08/03/2020 17:20:23
DT:  08/04/2020 00:32:58
 
 
Copies:                                
~
 
 
 
 
 
    Electronically Signed By: DERICK MCCORD MD  08/04/20 0706
_________________________________________________________________________________________
PATIENT NAME:     ONEIL LOPEZ               
MEDICAL RECORD #: [id number]            OPERATIVE REPORT              
          ACCT #: [id number]  
DATE OF BIRTH:   [birth year]            REPORT #: [id number]      
PHYSICIAN:        DERICK MCCORD MD              
PCP:              RUFUS WESLEY MD    
REPORT IS CONFIDENTIAL AND NOT TO BE RELEASED WITHOUT AUTHORIZATION

## 2020-08-04 NOTE — NUR
SCHEDULED MEDS ADMINISTERED PER EMAR. PT DENIES PAIN OR NAUSEA. DRESSING TO
RIGHT HIP CDI. SCD'S/TEDS IN PLACE. ICE TO INCISION. CMS INTACT. PT DENIES
FURTHER NEEDS. CALL LIGHT IN REACH.

## 2020-08-04 NOTE — NUR
PATIENT SITTING UP IN CHAIR. RN AND SON IN ROOM. VITAL SIGNS AND I&O DONE.
CALL LIGHT WITHIN REACH. NO OTHER NEEDS AT THIS TIME

## 2020-08-04 NOTE — NUR
VS AND I&O COMPLETE. PT REPOSITIONED IN BED. SCD'S/TEDS IN PLACE. PT REFUSING
TO WEAR HP AT THIS TIME. FRESH ICE TO RIGHT HIP. DRESSING CDI. CMS INTACT.
PT DENIES PRN FOR PAIN. CPOX IN PLACE. SpO2 94% ON 2L/NC.

## 2020-08-04 NOTE — NUR
REPORT RECEIVED FROM DAY SHIFT RN. PT LYING IN BED, ALERT AND ORIENTED.
SCDS/TEDS IN PLACE. DRESSING TO RIGHT HIP CDI. PT DENIES PRN FOR PAIN. NO
NEEDS AT THIS TIME. WHITE BOARD UPDATED. CALL LIGHT IN REACH.

## 2020-08-04 NOTE — NUR
CALL LIGHT ANSWERED. PATIENT RESTING IN BED. PHYSICAL THERAPIST AND FAMILY IN
ROOM. PATIENT USES THE BASE COMMODE. THREE PERSON ASSISTING. PATIENT BACKS TO
BED. THREE PERSON ASSISTING WITH CLOVER. CALL LIGHT WITHIN REACH. NO OTHER
NEEDS AT THIS TIME

## 2020-08-04 NOTE — NUR
PATIENT SITTING UP IN BED. SON IN ROOM. WITHE BOARD UPDATED. CALL LIGHT WITHIN
REACH. NO OTHER NEEDS AT THIS TIME

## 2020-08-04 NOTE — NUR
Pt in bed eating dinner at this time. Recently voided 250ml clear yellow
urine. Pt reports pain has improved. Right hip dressing remains CDI. SCD's
intact.

## 2020-08-05 NOTE — NUR
INNA FROM MARCIN BERNAL TO ADJUST FREQUENCY OF OXYCODONE Q6HR TO NOW BE Q4
HR PRN. DOSE TO REMAIN 5MG.

## 2020-08-05 NOTE — NUR
Pt in bed, reports doing well. Blood continuing to infuse at this time. Oxygen
saturations is 92% on 4L. Pt denies needs at this time.

## 2020-08-05 NOTE — NUR
Notified by Dr John she has had a lengthy discussion phone
with son, wanting to
know why his mother is not discharging. During our conversation Charge Rn
arrives and states Chhaya called and son has also called her seeking the same
information. Discussed at this point, we will refer him to Dr. Mccord for any
questions as he continues to deny knowledge which has been explained to him
many times by several people.

## 2020-08-05 NOTE — NUR
PATIENT SITTING UP IN BED. FAMILY IN ROOM. VITAL SIGNS AND I&O DONE. CALL
LIGHT WITHIN REACH. NO OTHER NEEDS AT THIS TIME

## 2020-08-05 NOTE — NUR
PATIENT SITTING UP IN BED. SON IN ROOM. WHITE BOARD UPDATED. SETS UP TABLE FOR
BREAKFAST. CALL LIGHT WITHIN REACH. NO OTHER NEEDS AT THIS TIME

## 2020-08-05 NOTE — NUR
CALL FROM RICHARD GARNICA THAT SON OF PATIENT HAD CALLED HER WITH QUESTIONS
ABOUT PATIENT TESTING/PLACEMENT.  SON AND DAUGHTER ABLE TO BE BACK IN ROOM AT
1607.

## 2020-08-05 NOTE — NUR
Received message from Faheem.  They plan on pt for Monday.  Pt will need a
second Covid test as [facility] requires - covid test with in 7 days of
admission.  Test ordered.  Attempted to return call and update Faheem but voice
mail x 3 over the last few hours and mailbox is full.

## 2020-08-05 NOTE — NUR
Pt sitting up in bed watching TV at this time, A&OX4. Pt reports pain is
tolerable. Right hip dressing is CDI, cms intact. SCD's in place. Pt denies
needs. Personal supplies and call light within reach.

## 2020-08-05 NOTE — NUR
Spoke with Virginia and her son.  He is very concerned about pt admission to
SNF where he is unable to see and is concerned about her care.  He does want
her to go to [facility].  Virginia is stating she does not want to leave
[place].  We again discussed SNFs in the area and I can request placement to
anywhere she would like to go.  She does not want to go to [facility], and does
agree she needs to go to a SNF for rehab.  She is discouraged as she was
unable to walk with the walker yesterday and required a hero.  Discussed this
is why she needs a rehab program. Informed I will call to confirm acceptance
from [facility].

## 2020-08-05 NOTE — NUR
Call from Keri Mckeon.  She saw pt this am. She has consulted with Dr. John.  Pt has UTI, remains on 02, and may require a transfusion.  They feel
pt will not dc until Monday.
 
Called and left message for [facility].

## 2020-08-05 NOTE — NUR
PATIENT RESTING IN BED. SON AND RN IN ROOM. VITAL SIGNS AND I&O DONE. CALL
LIGHT WITHIN REACH. NO OTHER NEEDS AT THIS TIME

## 2020-08-05 NOTE — NUR
Unit of PRBC's started at this time. Pt given education regarding signs and
symptoms to monitor for. Pt currently denies sob and or chest pain, vital
signs are stable. Pt verbalized that she will alert staff if she has an onset
of distress. Pt has no needs. This RN stayed with patient for the first
fifteen minutes. Call light within reach.

## 2020-08-05 NOTE — NUR
In to assist with patient using restroom. x1 unmeasured void, clear yellow
urine. Pt denies needs at this time and reports right hip pain is well
tolerated. No further needs. Call light within reach.

## 2020-08-05 NOTE — NUR
SCHEDULED MEDS ADMINISTERED PER EMAR. VS AND I&O COMPLETE. SpO2 ON RA LOW
70'S. PT WITH SHALLOW RESPIRATIONS. LUNGS CLEAR. ASYMPTOMATIC.
SON REPORTS PT PRIMARY DOCTOR HAS BEEN WANTING PT TO WEAR HOME OXYGEN. PT
PLACED ON 3L/NC AND CPOX AT THIS TIME. SpO2 91%. ENCOURAGED PT TO COUGH AND
DEEP BREATHE. RIGHT HIP DRESSING INTACT WITH SMALL AMOUNT SEROSANG DRAINAGE.
CMS INTACT. SCDS/TEDS IN PLACE. ENSURE PROVIDED. NO FURTHER NEEDS. CALL LIGHT
IN REACH.

## 2020-08-05 NOTE — NUR
Call from Faheem pelayo.  He will save a bed for [facility] for
Monday.  There is a possibility she could admit on Friday if she is
discharged, I will need to call him Friday.  He again states they require a
covid test within 7 days and informed it has been ordered for today.

## 2020-08-05 NOTE — NUR
PT USES CALL LIGHT APPROPRIATLY. REQUEST BED PAN. PT ROLLS EASILY AND WITH
MINIMAL PAIN. PT TRYING TO HAVE BM, BUT ONLY SMEAR AND URINE. PT THINKS IT
WOULD BE EASIER WHEN ABLE TO STAND AND USE BEDSIDE COMODE. SCD'S IN PLACE AND
FUNCTIONING. CALL LUGHT IN REACH

## 2020-08-05 NOTE — NUR
PATIENT SITTING UP IN BED. SON IN ROOM. VITAL SIGNS AND I&O DONE. CALL LIGHT
WITHIN REACH. NO OTHER NEEDS AT THIS TIME

## 2020-08-05 NOTE — NUR
Notified by staff, pt's son would like to speak. To nurses station son is
upset, wanting to know why pt will not dc as planned today or tomorrow and
plan now is to dc on Monday. Updated I received a call from Chhaya and Dr John and they had consulted and did not feel pt was ready for dc. Again
reviewed with son, we cannot give exact times as discharge will depend on how
Virginia is doing.  Chhaya had discussed with him this am possible blood
transfusion, antibiotics, and pt remains on 3l of 02 when she rounded.
He denies knowing any of this information. I appologized and told him I
thought he was aware.  His wife then reminded him, Virginia had started
antibiotics prior to the surgery.  I infomred them they is a possibility pt
could discharge on Friday, but it will depend on how she is doind and what Dr. Mccord decides.  It will also depend if [facility] has a room open and if we
can set up transportation.  I reminded them, transporation is out of pocket to
[facility].  He states his mom has firemed, informed this does not cover
transport to a Snf in .

## 2020-08-05 NOTE — NUR
REPORT AT BEDSIDE. PT AWAKE AND PARTICIAPTING. 4l NC. SCD PLACED AND
FUNCTIONING. PAIN CONTROLED RATING 3-4/10. CALL LIGHT IN REACH AND SON AT
BEDSIDE

## 2020-08-06 NOTE — NUR
DORI HAQ RECEIVED FROM MARCUS JORDAN AT BEDSIDE. PT AWAKE AND RESTING
IN CHAIR, FAMILY ALSO IN ROOM. PT VERBALIZING NAUSEA, PRN ZOFRAN GIVEN (SEE
EMAR). DRESSING TO RIGHT HIP INTACT, SOME DARK SHADOWING NOTED, WILL MONITOR
SITE FOR CHANGES IN SHADOWING. BOARD UPDATED, CALL LIGHT INR EACH. PT PLACED
ON 1LNC BY MARCUS RN FOR O2 SATS IN MID 80'S, QUICKLY RESOLVED TO MID 90'S.

## 2020-08-06 NOTE — NUR
WAS ABLE TO VISIT WITH PT TODAY. SHE IS SITTING IN CHAIR-SHE SAID STAFF
WANTED HER TO SIT IN CHAIR FOR AWHILE. PT COMPLAINED THAT R LEG WAS NOT
WORKING AS SHE THINKS IT SHOULD. STAIRS ARE A CHALLENGE. PT HAS TROUBLE
SLEEPING, HAS TV ON ALWAYS.  GAVE ENCOURAGEMENT AND BLESSING. LEFT COPY
OF G.POST. WILL FOLLOW

## 2020-08-06 NOTE — NUR
PATIENT WORKED WITH PT, UP TO COMMODE TO VOID/BM, 2-3 PERSON ASSIST DUE TO
ACTIVITY.  PATIENT TITRATED TO 2L VIA NC AND HOLDING AT 96%.  RT AND DR. SHABAZZ
NOTIFIED.

## 2020-08-06 NOTE — NUR
PT USES CALL LIGHT APPROPRIATLY. PT. REQUEST BED PAN. PT HAVING DIFFICULTY
WITH FINDING THE RIGHT MOVEMENT ON THE BED PAN.  CHANGED PAD UNDER PT.
CALL LIGHT IN REACH. NO REQUESTS AT THIS TIME

## 2020-08-06 NOTE — NUR
PATIENT IS ON ROOM AIR, O2 SATS ARE 95-96%, PATIENT IS USING THE INCENTIVE
SPIROMETER WELL, RT IN ROOM TO CHECK PATIENT ON ROOM AIR.

## 2020-08-06 NOTE — NUR
PT RESTING IN BED WITH EYES CLOSED. PT RATES PAIN 5/5 AND DESCRIBES AS "JUST
UNCOMFORTABLE". TORODOL ADMINISTERED. IV FLUSH 10ML NS. SCD'S PLACED AND
FUNCTONING. CALL LIGHT IN REACH. NO REQUEST AT THIS TIME

## 2020-08-06 NOTE — NUR
PT IS A PLEASNT LADY WITH FEARS OF BECOMING ADDICTED TO [employer].
IT WILL TAKE SOME CONVINCING TO GET HER TO TAKE THE OXY.  SHE HAD HER FIRST
REAL BOUGHT OF PAIN 8/10 AT 0500. SHE USES THE BED PAN FOR BOWLE AND BLADDER.
SHE WISHES THAT PT WILL HELP HER GET UP TO THE BED SIDE COMODE TODAY. SON
YARON AT BEDSIDE, VERY HELPFUL AND ATTENTVIE. PT IS ON 4L NC AND SATING HIGH
90'S. HER SCD ARE IN PLACE. SHE IS ON A REGULAR DIET.

## 2020-08-06 NOTE — NUR
MORNING ASSESSMENT DONE.  PATIENT RATES RIGHT HIP PAIN AS 5/10 BUT SAYS, "IT'S
NOT BOTHERING ME, MY PAIN IS FINE."  PATIENT ENDORSES POOR APPETITE, LIGHT
BREAKFAST ORDERED.  SON IS IN ROOM.  DR. SHABAZZ IN TO EVALUATE PATIENT.  DR. CAPPS HAS BEEN IN TO SEE PATIENT THIS MORNING.  PATIENT ENCOURAGED TO USE
INCENTIVE SPIROMETER BY DR. SHABAZZ.

## 2020-08-06 NOTE — NUR
SCHEDULED TYLENOL AND PRN OXYCODONE GIVEN FOR 5/10 PAIN (SEE EMAR). WARM
BLANKET AND ICE PACK FOR RIGHT HIP ALSO PROVIDED. NO FURTHER NEEDS, CALL LIGHT
IN REACH. FAMILY IN ROOM.

## 2020-08-06 NOTE — NUR
pt used call light.  STATES SHE IS UNCOMFRTBAL AND NEEDS TO BE RE-POISTIONED.
WE SCOOTED UP IN BED AND REPOSITONED. HOWEVER PT REPORTS PAIN 8/10. i
SUGGESTED SHE TAKE AND OXY TO HELP RELIVE HER PAIN, SHE REPLIED" I DONT WANT
TO GET ADDICITED" i EXPLAINED TO HER THAT TAKING THE MEDICATION AS DIRECTED BY
YOUR DOCTOR WILL HAVE A LOW RISK OF ADDICTION. SHE AGREED TO TAKE THE
MEDICATION. SON YARON AT BEDSIDE, DISCUSSING WITH HER THAT SHE WAITED TOO
LONG TO TAKE IT AND HE WILL HELP REMIND HER

## 2020-08-06 NOTE — NUR
WITH THE HELP OF IRENE RAE WE HELPED PT ON AND OFF THE BEDPAN. TWO WARM
BLANKETS GIVEN. BEDSIDE TABLE AND CALL LIGHT IN REACH.

## 2020-08-06 NOTE — NUR
ASSESSMENT COMPLETE, SCHEDULED MEDS GIVEN (SEE EMAR). PT REPORTS PAIN IS
TOLERABLE AT 5/10 AND WISHES TO WAIT UNTIL SCHEDULED TYLENOL. DRESSING TO
RIGHT HIP REMAINS UNCHANGED, NO NEW SHADOWING NOTED. DRESSING INTACT. PT
VERBALIZES NEED TO VOID. DISCUSSED OPTIONS TO PT OF WITHER USING CLOVER OR 2PA,
PT WISHES TO STAND. PT UP 2PA WITH FWW AND GAIT BELT. FREQUENT AFFIRMATION AND
DIRECTION UTILIZED, BUT PT DID WELL OVERALL. TOE TOUCH TO RLE. PT IN BED WITH
BILATERAL SCD'S AND JOHN HOSE IN PLACE. BILATERAL PEDAL PULSES NOTED. NO
FURTHER NEEDS, CALL LIGHT IN REACH. FAMILY IN ROOM. ROOM TIDIED.

## 2020-08-06 NOTE — NUR
PATIENT UP TO COMMODE WITH 2 PERSON ASSIST, GAIT BELT AND FWW.  PATIENT IS
UNABLE TO MOVE WITH TOE TOUCH AND WEIGHT BEARS ON RIGHT LEG.  PATIENT HAD
LARGE BM, VOIDED.  PATIENT UP TO CHAIR TO WORK WITH PHYSICAL THERAPY.  5MG OF
PO OXYCODONE GIVEN FOR 5/10 PAIN.  LINENS CHANGED.

## 2020-08-06 NOTE — NUR
AFTER ADMINISTRATION OF OXY AT 0430 PT IS GETTIGN PAIN RELIFE. MEDICATED WITH
SCHEDULED TYLENOL AND TORADOL.
 
PT TAKE ALL MEDS IN APPLESAUSE. LARGE PILLS NEED TO BE CUT IN HALF FOR EASIER
SWALLOWING

## 2020-08-07 NOTE — NUR
SCHEDULED TORADOL GIVEN FOR 5/10 PAIN. IV SITE WNL. PT DENIES FURTHER NEEDS,
CPOX IN PLACE. O2 SAT 96-97% 1LNC, HR LOW 80'S. CALL LIGHT IN REACH.

## 2020-08-07 NOTE — NUR
WITH THE HELP OF VIRGILIO OCASIO WE HELPED PT BACK TO BED FROM THE BSC.
REPOSITIONED HER IN BED. BEDSIDE TABLE AND CALL LIGHT IN REACH.

## 2020-08-07 NOTE — NUR
ASSESSMENT COMPLETE, SCHEDULED MEDS GIVEN ALONG WITH PRN OXYCODONE. VSS, PT UP
2PA WITH GAIT BELT AND FWW TO [facility] TO VOID. PT TOLERATED WELL. PER SHIFT REPORT
IV SITE LEAKING, SITE DISCONTINUED BY THIS RN, REINFORCED WITH 2X2 AND COBAN.
PT DENIES NAUSEA. NO CHANGE TO RIGHT HIP DRESSING. PT WILL CALL WHEN READY TO
AMBULATE BACK TO BED. CALL LIGHT IN REACH.

## 2020-08-07 NOTE — NUR
SPOKE WITH DR CAPPS ON PHONE. HE WANTS PT TO BE ENCOURAGED TO GET UP TO BSC OR
TOILET INSTEAD OF BEDPAN, ESPECIALLY AT NIGHT.

## 2020-08-07 NOTE — NUR
PT RESTING QUIETLY IN BED WITH EYES CLOSED. RR EVEN AND UNLABORED, NO DISTRESS
NOTED. CALL LIGHT IN REACH.

## 2020-08-07 NOTE — NUR
SHIFT REPORT RECEIVED FROM DAYSHIFT VIRGILIO INIGUEZ AT BEDSIDE. PT AWAKE AND RESTING
IN BED, REPORTS FEELING TIRED OF PHYSICAL ACTIVITY ON DAYSHIFT. DRESSING
INTACT TO RIGHT HIP, SITE WNL. WILL MONITOR. FAMILY ALSO IN ROOM. PT DENIES
NEEDS OR CONCERNS AT THIS TIME. CALL LIGHT IN REACH.

## 2020-08-07 NOTE — NUR
BETTER APPETITE THIS MORNING, TAKING FLUIDS WELL, SON AT BEDSIDE VERY
ATTENTIVE, PT WAS PREMEDICATED FOR THERAPY THIS AM, PHYSICAL THERAPY TO ROOM
AT THIS TIME FOR EXERCISES AND WILL GET PT UP. MOUNA OSEI HIP REINFORCED, PT ON
ROOM AIR AT THIS TIME TO EVAL WHILE WORKING WITH PT.

## 2020-08-07 NOTE — NUR
ASSESSMENT COMPLETE, PT AWAKE AND RESTING IN BED. O2 SAT 98%, NC
TITRATED TO 0.5LNC. WILL MONITOR. DRESSING TO RIGHT HIP REMAINS INTACT, SCANT
INCREASE TO SHADOWING, WILL MONITOR. PT DENIES NEED FOR PAIN MEDICATION. NO
FURTHER NEEDS, CALL LIGHT IN REACH.

## 2020-08-07 NOTE — NUR
APPROX THIS TIME, RT INFORMED THIS RN THAT PT'S WAS SPOT CHECKED AND O2 SAT
WAS IN THE 70'S ON RA. PER RT PT WAS THEN PLACED ON 3LNC BRIEFLY AND QUICKLY
RESOLVED TO UPPER 90'S. PT IS NOW SUSTAINING O2 SATS IN THE 90'S ON 1LNC.

## 2020-08-07 NOTE — NUR
SCHEDULED TORADOL AND TYLENOL GIVEN FOR 5/10 PAIN ALONG WITH PRN OXYCODONE. IV
SITE WNL, SON ALSO IN ROOM. PT VERBALIZES NEED TO VOID, DOES NOT WANT TO STAND
UP TO VOID. IRENE RAE AND IRENE NIX IN ROOM TO ASSESS PT. CALL LIGHT IN
REACH.

## 2020-08-07 NOTE — NUR
PT IS TRANSFERING ONTO BSC WITH 2 PERSON ASSIST USING WALKER, PT IS STRONGER
AND MOTIVATION TO ASSIST SELF IS BETTER THIS AFTERNOON. REPORTS LITTLE TO NO
PAIN AT REST. DENIES NAUSEA AFTER DINNER. FAMILY IN ROOM VISITING.

## 2020-08-07 NOTE — NUR
TWO PERSON ASSIST TO STAND USING FWW AND PIVOT TO CHAIR, NEEDS CONSTANT CUES
AND ENCOURAGEMENT. ABLE TO BEAR FULL WT. WITH PRECAUTIONS TO CHAIR. NAUSEA
AFTER ACTIVITY, EMESIS OF 300ML, ZOFRAN SL GIVEN WITH GOOD RELIEF. SITTING UP
IN CHAIR, ENCOURAGED TO USE IS, 90-94% ON ROOM AIR, WILL CONT TO MONITOR,.CALL
LIGHT IN EASY REACH. SON IN ROOM. STATES SHE FEELS BETTER AND IS COMFORTABLE.

## 2020-08-07 NOTE — NUR
WITH THE HELP OF IRENE RAE WE HELPED PT TO THE BSC AND BACK TO BED WITH HER
GAIT BELT. SCD'S TURNED BACK ON. FRESH ICE WATER GIVEN. BEDSIDE TABLE AND CALL
LIGHT IN REACH. SON IN ROOM AS WELL WHEN WE WERE TRANSFERING HER. PT OR HER
SON NEED NOTHING MORE AT THIS TIME.

## 2020-08-07 NOTE — NUR
Awaiting second covid test, no results as of yet.  Pt cannot admit to [facility] without this completed test.

## 2020-08-07 NOTE — NUR
pt USED CALL LIGHT TO ASK FOR ASSISTANCE VOIDING. REQUESTED BEDPAN BECAUSE pt
STATES "IT IS TOO CUMBERSOME TO GET OUT OF BED ONTO BSC TO PEE" pt
ENCOURAGED TO USE BSC, BUT HER OPINON REMAINED UNCHANGED. IRENE NIX AND MYSELF
ASSISTED pt.

## 2020-08-07 NOTE — NUR
DR CAPPS IN TO SEE PT. TORADOL DC. OK TO CHANGE MOUNA TO R HIP, PHYSICAL
THERAPY COMING IN TO WORK WITH PT AGAIN AND ASSIST TO BED, PREMEDICATED FOR
PAIN CONTROL.

## 2020-08-07 NOTE — NUR
informed by colton alfaro pt's dressing was leaking blood when she stood to
void. pt in bed resting, no active leaking noted at this time. dressing almost
fully saturated, dayshiamol acnales also at bedside. will monitor. carlos canales to reinforce site.

## 2020-08-07 NOTE — NUR
Spoke with [place].  Son in room. He is requesting name of Physcian over [facility]. Notified it is Dr. Everardo Kong.  Discussed pt more than likely will dc
on Monday to [facility] as Dr Mccord is in surgery and has not seen
[place]ia today.
Pt denies needs.  States she has been nauseated and vomited.

## 2020-08-08 PROCEDURE — 30233N1 TRANSFUSION OF NONAUTOLOGOUS RED BLOOD CELLS INTO PERIPHERAL VEIN, PERCUTANEOUS APPROACH: ICD-10-PCS | Performed by: SPECIALIST

## 2020-08-08 NOTE — NUR
PATIENT SITTING UP IN CHAIR. DAUGHTER IN LAW IN ROOM. VITAL SIGNS AND I&O
DONE. CALL LIGHT WITHIN REACH. NO OTHER NEEDS AT THIS TIME

## 2020-08-08 NOTE — NUR
[facility]IGUE[facility] AT BEDSIDE. PT AWAKE AND RESTING
IN BED, DRESSING INTACT. ABD IN PLACE TO DISTAL PORTION OF DRESSING DUE TO
SCANT LEAKING PER DAYSHIFT RN. WILL MONITOR. SMALL AMOUNT SEROSANGUINEOUS
SHADOWING NOTED. PT APPEARS COMFORTABLE, DENIES NEEDS AND CALL LIGHT IN REACH.
FAMILY IN ROOM.

## 2020-08-08 NOTE — NUR
PT STATES SHE IS WET, 2 PERSON ASSIST TO TRANSFER UP TO BS, GOOD SKIN CARE
GIVEN AND ASSISTED INTO CHAIR FOR BREAKFAST. PT IS MUCH STRONGER TODAY AND
DOING WELL WITH PRECATIONS, STATES NO PAIN AT REST, DENIES NAUSEA, BREAKFAST
JUST BROUGHT IN. SON IN ROOM. CALL LIGHT IN EASY REACH.

## 2020-08-08 NOTE — NUR
PATIENT SITTING UP IN CHAIR. SON AND DAUGHTER IN LAW IN ROOM. VITAL SIGNS AND
I&O DONE. LOW OXYGEN SATURATION. RN NOTIFIED. CALL LIGHT WITHIN REACH. NO
OTHER NEEDS AT THIS TIME

## 2020-08-08 NOTE — NUR
scheduled tylenol and toradol along with prn oxycodone given for 6/10 pain
(see emar). son in room, denies further needs. call light in reach.

## 2020-08-08 NOTE — NUR
CALL LIGHT ANSWERED. PATIENT SITTING UP IN CHAIR. PATIENT ASSISTED TO USE THE
BASE COMMODE. PERICARE PERFORMED. PATIENT BACKS TO CHAIR. TWO PERSON ASSISTING
WITH WALKER. CALL LIGHT WITHIN REACH. NO OTHER NEEDS AT THIS TIME

## 2020-08-08 NOTE — NUR
WITH THE HELP OF VIRGILIO PARISH WE HELPED PT TO THE BSC AND BACK TO BED WITH HER
FWW. GARBAGES EMPTIED. BEDSIDE TABLE AND CALL LIGHT IN REACH.

## 2020-08-08 NOTE — NUR
WITH THE HELP OF VIRGILIO OCASIO WE HELPED PT TO THE BSC AND BACK TO BED WITH HER
FWW. BEDSIDE TABLE AND CALL LIGHT IN REACH. BOOSTED HER UP IN BED .

## 2020-08-08 NOTE — NUR
ASSESSMENT COMPLETE, SCHEDULED MEDS GIVEN ALONG WITH PRN PAIN PILL (SEE EMAR).
VS AND I&O'S STABLE. NO NEW SHADOWING NOTED, WILL MONITOR. BILATERAL PEDAL
PULSES NOTED. CALL LIGHT IN REACH. SCD'S AND JOHN HOSE ON.

## 2020-08-08 NOTE — NUR
pt up 2pa with fww to bsc to void, pt tolerated well. pt back in bed, voided
x1. vs and i&o's complete. no further needs, scd's and jayy hose in place. son
in room. call light in reach.

## 2020-08-08 NOTE — NUR
PATIENT RESTING IN BED. SON IN ROOM. PATIENT INCONTINENT OF  URINE. PERICARE
PERFORMED. PATIENT ASSISTED TO TRANSFER TO THE CHAIR. TWO PERSON ASSISTING.
LINENS CHANGED. WARM BLANKET PROVIDED. CALL LIGHT WITHIN REACH. NO OTHER NEEDS
AT THIS TIME

## 2020-08-08 NOTE — NUR
WITH THE HELP OF VIRGILIO OCASIO WE HELPED PT TO THE BSC AND BACK TO BED WITH HER
FWW. BEDSIDE TABLE AND CALL LIGHT IN REACH. GARBAGES EMPTIED. PT NEEDS NOTHING
MORE AT THIS TIME.

## 2020-08-08 NOTE — PATH
[facility]
                                    [address]
                                  [place], [place]  [address]
_________________________________________________________________________________________
                                                                 Signed   
 
 
 
**** THIS IS AN AMENDED REPORT ****
ORDERING PHYSICIAN:
Giancarlo Mccord MD
PATIENT NAME:
ONEIL LOPEZ
GENDER:  F     :  1940
 
SPECIMEN(S):
 
MOLECULAR PATHOLOGY RESULTS:
SARS-CoV-2     Not Detected
 
ADDITIONAL NOTES.:
The [employer] Fusion SARS-CoV-2 Assay is a multiplex real-time PCR (RT-PCR) in 
vitro diagnostic test intended for the qualitative detection of RNA from 
SARS-CoV-2 from individuals who meet COVID-19 
clinical and/or epidemiological criteria. In general, SARS-CoV-2 RNA can be 
detected during the acute phase of infection. 
Positive results indicate the presence of SARS-CoV-2 RNA.  Clinical correlation 
with patient history and other diagnostic information is necessary to determine 
patient infection status. Positive 
results do not rule out bacterial infection or co-infection with other viruses.
Negative results do not preclude SARS-CoV-2 infection and should not be used as 
the sole basis for patient management decisions. Negative results must be 
combined with other clinical observations, 
patient history, and epidemiological information.
 
The [place] Fusion SARS-CoV-2 Assay is not yet approved or cleared by the 
[place] FDA. When there are no FDA-approved or cleared tests available, 
and other criteria are met, FDA can make tests 
available under an emergency access mechanism called an Emergency Use 
Authorization (EUA). The EUA for this test is supported by the [place] of 
Health and Human Service's (HHS's) declaration that 
circumstances exist to justify the emergency use of in vitro diagnostics for 
the detection and/or diagnosis of the virus that causes COVID-19. This EUA will 
remain in effect for the duration of the 
COVID-19 declaration justifying emergency of IVDs, unless it is terminated or 
revoked by FDA, after which the test may no longer be used. The [place] Fusion 
SARS-CoV-2 Assay is for use only under EUA 
in US laboratories certified under the Clinical Laboratory Improvement 
Amendments of 1988 (CLIA) to perform high complexity tests. [employer] 
 
                                                                                    
_________________________________________________________________________________________
PATIENT NAME:     ONEIL LOPEZ               
MEDICAL RECORD #: [id number]            PATHOLOGY                     
          ACCT #: [id number]       ACCESSION #: [id number]     
DATE OF BIRTH:   [birth year]            REPORT #: [id number]       
PHYSICIAN:        BEAU PATHOLOGY              
PCP:              RUFUS WESLEY MD    
REPORT IS CONFIDENTIAL AND NOT TO BE RELEASED WITHOUT AUTHORIZATION
 
 
                                  [address]  [address]
_________________________________________________________________________________________
                                                                 Signed   
 
 
is certified under CLIA to perform high complexity 
 
clinical laboratory testing.
 
PERFORMING LABORATORY.:
Molecular testing was performed by [employer] [address] [place], [place] [address] [address] (Medical Director:  Mane Kohler D.O.; CLIA#:  
[id number]) 
 
Diagnostician:  ***System Interface***
Pathologist
Diagnostician:  Nathan Solis MD
Pathologist
Electronically Signed 2020
 
 
Copies:                                
~
 
 
 
 
 
 
 
 
 
 
 
 
 
 
 
 
 
 
 
 
 
 
 
 
 
                                                                                    
_________________________________________________________________________________________
PATIENT NAME:     ONEIL LOPEZ               
MEDICAL RECORD #: [id number]            PATHOLOGY                     
          ACCT #: [id number]       ACCESSION #: [id number]     
DATE OF BIRTH:   [birth year]            REPORT #: [id number]       
PHYSICIAN:        BEAU ZUNIGA              
PCP:              RUFUS WESLEY MD    
REPORT IS CONFIDENTIAL AND NOT TO BE RELEASED WITHOUT AUTHORIZATION

## 2020-08-08 NOTE — NUR
PT UP 2PA WITH FWW TO VOID, TOLERATED WELL. PT BACK IN BED. ASSESSMENT
COMPLETE, NO NEW CAHNGES OR CONCERNS. PT REPORTS TOLERABLE 5/10 PAIN. DENIES
NEEDS FOR PAIN MEDICATION AT THIS TIME, SCANT SEROSANGUINEOUS SHADOWING NOTED.
DRESSING INTACT. NO FURTHER NEEDS, CALL LIGHT IN REACH.

## 2020-08-08 NOTE — NUR
WITH THE HELP OF VIRGILIO PARISH WE HELPED PT TO THE BSC AND BACK TO BED WITH HER
FWW. VITALS AND I&OS DONE AND CHARTED. FRESH WATER GIVEN. PT NEEDS NOTHING
MORE AT THIS TIME.

## 2020-08-08 NOTE — NUR
PT RESTING IN BED WITH EYES CLOSED, RR EVEN AND UNLABORED. O2 SAT 93 ON 1LNC,
HR 84. NO DISTRESS NOTED. CALL LIGHT IN REACH.

## 2020-08-08 NOTE — NUR
PATIENT SITTING UP IN CHAIR. PATIENT ASSISTED TO USE THE BASE COMMODE. TWO
PERSON ASSISTING WITH WALKER. PATIENT BACKS TO CHAIR. CALL LIGHT WITHIN REACH.
NO OTHER NEEDS AT THIS TIME

## 2020-08-08 NOTE — NUR
TWO PERSON ASSIST TO STAND USING WALKER AND PIVOT TO BSC AND RECLINER, HAS
BEEN UP MOST OF DAY, WORKED WELL WITH PHYSICAL THERAPY, GOOD PAIN CONTROL,
CONT. TO REQUIRE ENCOURAGEMENT TO EAT. DRINKING ENSURE REPLACEMENTS. FAMILY IN
ROOM WITH PATIENT. DRSG REINFORCED THIS EVENING DUE TO SMALL AMOUNT RED
SERROUS FLUID LEAKING WITH ACTIVITY.

## 2020-08-08 NOTE — NUR
WITH THE HELP OF VIRGILIO PARISH WE HELPED PT TO THE BSC AND BACK TO BED WITH HER
FWW. BEDSIDE TABLE AND CALL LIGHT IN REACH. HELPED HER POSITION TO HER COMFORT
IN HER BED. MOUTH SWABS GIVEN DUE TO A DRY MOUTH. PT NEEDS NOTHING MORE AT
THIS TIME.

## 2020-08-08 NOTE — NUR
PT AWAKE AND RESTING IN BED, 0.5LNC IN PLACE, CPOX ALSO ON. O2 SAT 96%, HR
80'S. NO NEEDS VERBALIZED, CALL LIGHT IN REACH.

## 2020-08-08 NOTE — NUR
COPY OF 2ND COVID-19 TEST FAXED TO [facility]. FOLLOW-UP PHONE ALSO PLACED,
BUT LINE BUSY. WILL KEEP TRYING TO GET INTOUCH WITH [facility] TODAY.

## 2020-08-09 NOTE — NUR
THIS RN IN ROOM TO ASSIST IAN POOLE AND PT BACK TO TO BED. PT APPEARS TO BE
WEAKER THIS EVENING AND HER RIGHT LEG WAS NOT MOVING SO PT ATTEMPTED TO SIT
DOWN SOONER AND ALMOST MISSED THE BED BUT IAN POOLE CAUGHT PT ON HER THIGH.
PT ABLE TO ADJUST HERSELF AND GET SAFELY BACK TO BED.

## 2020-08-09 NOTE — NUR
PT AWAKE AND RESTING IN BED, O2 SAT ABOVE 90% ON RA, . PT APPEARS
RELAXED AND DENIES NEEDS. CALL LIGHT IN REACH.

## 2020-08-09 NOTE — NUR
ASSISTED PT TO BSC WITH HELP FROM VIRGILIO OCASIO. PT TOLERATED WELL. O2 SATS MID
80'S UPON RETURNING TO BED ON RA. RESOLVED AFTER INSTRUCTION ON DEEP
BREATHING. CPOX IN PLACE TO MONITOR.O2 SAT SUSTAINING ABOVE 90%, HR MILDLY
TACHY . SCHEDULED MEDS WITH PRN PAIN MEDICATION GIVEN (SEE EMAR). NO
FURTHER NEEDS, CALL LIGHT IN REACH.

## 2020-08-09 NOTE — NUR
WITH THE HELP OF VIRGILIO ABREU WE HELPED PT TO THE BSC AND BACK TO BED WITH HER
FWW. PT TOLLERATED IT WELL. SCD'S PUT BACK ON. BEDSIDE TABLE AND CALL LIGHT IN
REACH. SHE NEEDS NOTHING MORE AT THIS TIME.

## 2020-08-09 NOTE — NUR
PATIENT SITTING UP IN CHAIR. SON AND DAUGHTER IN LOW IN ROOM. WARM BLANKET
PROVIDED. CALL LIGHT WITHIN REACH. NO OTHER NEEDS AT THIS TIME

## 2020-08-09 NOTE — NUR
SHIFT REPORT RECEIVED FROM DAYSHIFT VIRGILIO CANAS AT BEDSIDE. PT AWAKE AND RESTING
IN BED, SONYA DRESSING C/D/I, GREEN LIGHT FLASHING. PT REPORTS FEELING TIRED,
NO DENIES NEEDS. CALL LIGHT IN REACH.

## 2020-08-09 NOTE — NUR
PATIENT RESTING IN BED. DAUGHTER IN LAW IN ROOM. PATIENT USES THE BASE
COMMODE. TWO PERSON ASSISTING WITH WALKER. LINENS CHANGED. PATIENT BACKS TO
BED. CALL LIGHT WITHIN REACH. NO OTHER NEEDS AT THIS TIME

## 2020-08-09 NOTE — NUR
IN PTS ROOM TO ASSIST PT TO BEDSIDE COMMODE. PT TOLERATED WELL AND WAS ABLE TO
ADJUST HERSELF IN THE BED WITH BED ASSIST

## 2020-08-09 NOTE — NUR
ASSISTED PT TO THE [facility] ALONG WITH APRIL IRENE. PT IS NOW BACK IN BED. SHE
REPORTS PAIN AT 5/10 AND DENIES THE NEED FOR PAIN MEDS AT THIS TIME. CALL
LIGHT IS CLOSE AND PT DENIES FURTHER NEEDS. SON IS IN ROOM. VS AND I&OS
ENTERED.

## 2020-08-09 NOTE — NUR
WITH THE HELP OF VIRGILIO ABREU WE HELPED PT TO THE BSC AND BACK TO BED WITH HER
FWW. SCD'S PUT BACK ON. FRESH WATER GIVEN. VITALS AND I&OS DONE AND
CHARTED.BEDSIDE TABLE AND CALL LIGHT IN REACH.

## 2020-08-09 NOTE — NUR
WITH THE HELP OF IRENE RAE WE HELPED PT TO THE BSC AND BACK TO BED WITH HER
FWW. BEDSIDE TABLE AND CALL LIGHT IN REACH. SON IN THE ROOM WHEN WE LEFT. PT
RESTING IN BED. SHE NEEDS NOTHING MORE AT THIS TIME.

## 2020-08-09 NOTE — NUR
ASSESSMENT COMPLETE, NO SCHEDULED MEDS AT THIS TIME. PT RECENTLY VOIDED AND IS
IN BED RESTING. VSS, HR TACHY . WILL MONITOR. DRESSING REMAINS C/D/I,
GREEN OKAY LIGHT FLASHING. CMS INTACT, SCD'S AND JOHN HOSE IN PLACE. PT REPORTS
TOLERBLE 5/10 PAIN. DENIES NAUSEA. FAMILY IN ROOM, CALL LIGHT IN REACH.

## 2020-08-09 NOTE — NUR
PT HAD A GOOD NIGHT, PAIN CONTROLLED WITH PRN OXYCODONE AND SCHEDULED TYLENOL.
2PA WITH FWW TO BSC, PT CONTINUES TO GET STRONGER. CALLS APPROPERIATELY,
DRESSING TO RIGHT HIP, SCANT LEAKING FROM DISTAL PORTION OF DRESSING, CMS
INTACT. VOIDING QS.

## 2020-08-09 NOTE — NUR
PATIENT SITTING UP IN CHAIR. SON AND DAUGHTER IN LAW IN ROOM. VITAL SIGNS AND
I&O DONE. CALL LIGHT WITHIN REACH. NO OTHER NEEDS AT THIS TIME

## 2020-08-09 NOTE — NUR
PATIENT SITTING UP IN BED. DAUGHTER IN LAW IN ROOM. VITAL SIGNS AND I&O DONE.
SETS UP TABLE FOR DINNER. CALL LIGHT WITHIN REACH. NO OTHER NEEDS AT THIS TIME

## 2020-08-09 NOTE — NUR
IN PTS ROOM TO GIVE MORNING MEDS AND DO ASSESSMENT. PT STILL STATING THAT SHE
WANTS TO GET SOME GOOD REST TODAY. DISCUSSED FURTHER WITH PT AND PTS SON THAT
STAFF WILL TRY TO [facility] SO SHE CAN GET SOME SLEEP

## 2020-08-09 NOTE — NUR
RECEIVED REPORT FROM JEM POOLE. PT STATES THAT SHE IS VERY TIRED AND WOULD
LIKE TO GET SOME SLEEP TODAY. PT O2 SAT AT 95% ON 2L NC. THIS RN PUT PT ON 1L
NC AND PT STILL SATING 94-95%.

## 2020-08-09 NOTE — NUR
PATIENT SITTING UP IN ON THE EDGE OF THE BED. SON AND PHYSICAL THERAPIST IN
ROOM. VITAL SIGNS AND I&O DONE. CALL LIGHT WITHIN REACH. NO OTHER NEEDS AT
THIS TIME

## 2020-08-09 NOTE — NUR
PATIENT SLEEPING. SON IN ROOM. WHITE BOARD UPDATED. CALL LIGHT WITHIN REACH.
NO OTHER NEEDS AT THIS TIME

## 2020-08-10 NOTE — NUR
VITALS AND I&OS DONE AND CHARTED. GARBAGES EMPTIED. WITH THE HELP OF VIRGILIO BULL WE HELPED PT TO THE BSC. WITH THE HELP OF VIRGILIO PARISH WE HELPED PT
BACK TO BED WITH HER FWW. I DID A COMPLETE BED CHANGE DUE TO PT SPILLING HER
WATER IN HER BED. CHANGED HER GOWN ALSO. BEDSIDE TABLE AND CALL LIGHT IN
REACH. PT NEEDS NOTHING MORE AT THIS TIME. ALSO FRESH WATER GIVEN.

## 2020-08-10 NOTE — NUR
CALL LIGHT ANSWERED, 2PA TO PIVOT TO BSC WITH FWW FOR VOID AND BM. pt BACK IN
BED. VIRGILIO PARISH IN ROOM.

## 2020-08-10 NOTE — NUR
PT HAD A GOOD NIGHT, RESTED FOR MOST OF THE SHIFT. VSS, REMAINED ON RA. CALLS
APPROPERIATELY. 2PA WITH FWW. SONYA DRESSING TO RIGHT HIP WITH SCANT YELLOW
SHADOWING, GREEN LIGHT FLASHING. SCD'S AND JOHN HOSE ON. REGULAR DIET,
ENCOURAGE PO INTAKE. VOIDING QS. SMALL BM THIS SHIFT.

## 2020-08-10 NOTE — NUR
Update given to pt and family.  All in order, awaiting call from [facility]
transport.  Called and spoke with [facility] and they do not have
 available today.

## 2020-08-10 NOTE — NUR
IN PTS ROOM TO DO ASSESSMENT AND GIVE MORNING MEDS. PT AWAKE AND ALERT THIS
AM. QUESTIONS ANSWERED TO THE BEST OF THE ABILITY OF THIS RN AT THIS TIME.

## 2020-08-10 NOTE — NUR
ASSESSMENT COMPLETE, NO NEW CHANGES OR CONCERNS. IN ROOM TO ASSIST PT TO BSC.
PT UP 2PA WITH FWW TO BSC, TOLERATED WELL AND BACK TO BED. SCANT YELLOW
SHADOWING TO SONYA DRESSING, GREEN LIGHT FLASHING. CMS INTACT, SCD'S AND JOHN
HOSE IN PLACE. PT REPORTS TOLERABLE 5/10 PAIN, DENIES NEED FOR MEDICATION AT
THIS TIME. CALL LIGHT IN REACH.

## 2020-08-10 NOTE — NUR
PATIENT AWAKE, BUT WANTS TO BE LEFT ALONE FOR A WHILE LONGER TO REST. SON IN
ROOM. WHITE BOARD UPDATED. CALL LIGHT WITHIN REACH. NO FURTHER NEEDS AT THIS
TIME.

## 2020-08-10 NOTE — NUR
RECEIVED REPORT FROM JEM POOLE. PT APPEARS TO BE RESTING AT THIS TIME WITH
RESPIRATIONS NOTED. PTS SON AT BEDSIDE THIS AM.

## 2020-08-10 NOTE — NUR
Notified by staff pt is refusing to ride in van to [facility].  Spoke with pt
and family, pt is stating her wc wouldn't fit in the van and they left  and
sent another van.  Pt is now stating the air conditioning is not working in
the van.  Notified by staff a different van is on the way. Son and daughter in
law are very upset.  Third van arrives and  loads pt into wc van.  Pt is
again stating the air conditioning is not working.  I can feel the cool air in
the front and suggested, it's very hot out and they need to close the windows
and start driving.  JENNIFER and son insisting air is not working.  Staff brought
pt two portable fans for transport earlier, pt and family refuse to use these.
JENNIFER and son adjusting air conditioning knobs in van,  telling them he
knows how to run his van and air conditioning.  Also, lets them know he just
returned from [place] from and transport and air conditioning worked.
Family wanting an ambulance transport.  I again explained in depth, pt does
not qualify by medicare rules as she can walk.  They can pay out of pocket,
but this will be around $1100.  We have returned pt. to a rm 109 at this time.
She is refusing transport, refusing ambulance due to cost.  She is stating the
 threw a buckle and hit her foot when she was unloaded.  Family would
like Dr. Mccord to write a statement showing pt has no other alternative than
an ambulance ride.  Discussed with family this is not accurate and there are
many places that do not have air conditioning, it is uncomfortable, but it is
not a medical necessity. Called and updated [facility], it is now after 3:15
and pt was to arrive by 2 pm for admit.
 
Called and spoke with Dr. Mccord.  Updated to last 2 hours.  He declines to
write a statement as he states pt can ride in the  van. I discussed if it
would harm the pt to transport per family car.  He states this will not harm
the pt. in any way.
 
Returned and spoke with the family and offered:
1. [facility] van transport with fans
2. Ambulance with private pay
3. Private transport per their car.
 
Pt initially is refusing all transport as she feels this could damage her
surgery.  Family discuss and agree to transport by private car.  Pt at this
time has been incontinent and nurses cleaned pt.  Family provided with chux
and two pads placed on pt for transport.  Family are getting vehicle to
transport.  Three nurses with family to assist pt into car.  Called at spoke
with Michelle at [facility], they will have staff available to unload pt.

## 2020-08-10 NOTE — NUR
Left message for Faheem x 2.  Received return call from Michelle as Faheem is not
working today.  UPdated I have faxed orders, notes, covid test, and am
awaiting Rx for Hydrocodone.  She is unable to see his faxes so all refaxed to
her fax number.  Room is held for her.  They would like her there no later
than 2 pm.  Notified I have a call in to  van transport.

## 2020-08-10 NOTE — NUR
JUANCARLOSR, PT notes for 3 days,  Progress notes, Covid test obtained on 8/5/20
faxed to [facility] with note, stating awaiting SNF orders as Dr. Mccord is
currently in the OR.  Will fax when completed.

## 2020-08-10 NOTE — NUR
Spoke with Michelle from [facility].  Paperwork is completed. Pt can admit any
time prior to 2 pm.  Awaiting return call from [facility] for [facility] transport to
[facility].

## 2022-07-14 ENCOUNTER — HOSPITAL ENCOUNTER (EMERGENCY)
Dept: HOSPITAL 46 - ED | Age: 82
Discharge: HOME | End: 2022-07-14
Payer: MEDICARE

## 2022-07-14 VITALS — WEIGHT: 149.01 LBS | BODY MASS INDEX: 26.4 KG/M2 | HEIGHT: 63 IN

## 2022-07-14 DIAGNOSIS — N39.0: Primary | ICD-10-CM

## 2022-07-14 DIAGNOSIS — Z79.899: ICD-10-CM

## 2022-07-14 DIAGNOSIS — Z88.5: ICD-10-CM

## 2022-07-14 DIAGNOSIS — Z79.82: ICD-10-CM

## 2022-07-14 DIAGNOSIS — Z87.891: ICD-10-CM

## 2022-07-14 DIAGNOSIS — Z88.1: ICD-10-CM

## 2022-07-14 DIAGNOSIS — K21.9: ICD-10-CM

## 2022-07-14 DIAGNOSIS — Z88.0: ICD-10-CM

## 2022-07-14 DIAGNOSIS — Z20.822: ICD-10-CM

## 2022-07-14 DIAGNOSIS — R53.1: ICD-10-CM

## 2022-07-14 DIAGNOSIS — Z88.2: ICD-10-CM

## 2022-07-14 PROCEDURE — C9803 HOPD COVID-19 SPEC COLLECT: HCPCS

## 2022-07-14 PROCEDURE — U0003 INFECTIOUS AGENT DETECTION BY NUCLEIC ACID (DNA OR RNA); SEVERE ACUTE RESPIRATORY SYNDROME CORONAVIRUS 2 (SARS-COV-2) (CORONAVIRUS DISEASE [COVID-19]), AMPLIFIED PROBE TECHNIQUE, MAKING USE OF HIGH THROUGHPUT TECHNOLOGIES AS DESCRIBED BY CMS-2020-01-R: HCPCS

## 2023-11-21 NOTE — NUR
ASSESSMENT COMPLETE, PT RESTING IN BED. 0.5LNC IN PLACE, PT TOLERATING WELL.
CPOX IN PLACE, O2 SAT IN UPPER 90'S. HR WNL. DRESSING REMAINS UNCHANGED, PT
DENIES NAUSEA. REPORTS TOLERABLE 5/10 PAIN. NO FURTHER NEEDS, CALL LIGHT IN
REACH. PROVIDER:[TOKEN:[[id number]],FOLLOWUP:[1 week]],FREE:[LAST:[Skinny],FIRST:[Ed],PHONE:[(   )    -],FAX:[(   )    -],FOLLOWUP:[1 week]] PROVIDER:[TOKEN:[[id number]],FOLLOWUP:[1 week]],FREE:[LAST:[Skinny],FIRST:[Ed],PHONE:[(   )    -],FAX:[(   )    -],FOLLOWUP:[1 week]] PROVIDER:[TOKEN:[[id number]],FOLLOWUP:[1 week]],FREE:[LAST:[Skinny],FIRST:[Ed],PHONE:[(   )    -],FAX:[(   )    -],FOLLOWUP:[1 week]]